# Patient Record
Sex: FEMALE | Race: WHITE | NOT HISPANIC OR LATINO | Employment: OTHER | URBAN - METROPOLITAN AREA
[De-identification: names, ages, dates, MRNs, and addresses within clinical notes are randomized per-mention and may not be internally consistent; named-entity substitution may affect disease eponyms.]

---

## 2017-02-22 ENCOUNTER — ALLSCRIPTS OFFICE VISIT (OUTPATIENT)
Dept: OTHER | Facility: OTHER | Age: 67
End: 2017-02-22

## 2017-04-10 ENCOUNTER — ALLSCRIPTS OFFICE VISIT (OUTPATIENT)
Dept: OTHER | Facility: OTHER | Age: 67
End: 2017-04-10

## 2017-04-25 ENCOUNTER — LAB REQUISITION (OUTPATIENT)
Dept: LAB | Facility: HOSPITAL | Age: 67
End: 2017-04-25
Payer: MEDICARE

## 2017-04-25 ENCOUNTER — GENERIC CONVERSION - ENCOUNTER (OUTPATIENT)
Dept: OTHER | Facility: OTHER | Age: 67
End: 2017-04-25

## 2017-04-25 DIAGNOSIS — D64.9 ANEMIA: ICD-10-CM

## 2017-04-25 DIAGNOSIS — M81.0 AGE-RELATED OSTEOPOROSIS WITHOUT CURRENT PATHOLOGICAL FRACTURE: ICD-10-CM

## 2017-04-25 DIAGNOSIS — E78.2 MIXED HYPERLIPIDEMIA: ICD-10-CM

## 2017-04-25 DIAGNOSIS — E03.9 HYPOTHYROIDISM: ICD-10-CM

## 2017-04-25 LAB
25(OH)D3 SERPL-MCNC: 37.1 NG/ML (ref 30–100)
ALBUMIN SERPL BCP-MCNC: 3.5 G/DL (ref 3.5–5)
ALP SERPL-CCNC: 118 U/L (ref 46–116)
ALT SERPL W P-5'-P-CCNC: 21 U/L (ref 12–78)
ANION GAP SERPL CALCULATED.3IONS-SCNC: 8 MMOL/L (ref 4–13)
AST SERPL W P-5'-P-CCNC: 12 U/L (ref 5–45)
BASOPHILS # BLD AUTO: 0.02 THOUSANDS/ΜL (ref 0–0.1)
BASOPHILS NFR BLD AUTO: 0 % (ref 0–1)
BILIRUB SERPL-MCNC: 0.22 MG/DL (ref 0.2–1)
BUN SERPL-MCNC: 13 MG/DL (ref 5–25)
CALCIUM SERPL-MCNC: 8.9 MG/DL (ref 8.3–10.1)
CHLORIDE SERPL-SCNC: 100 MMOL/L (ref 100–108)
CHOLEST SERPL-MCNC: 192 MG/DL (ref 50–200)
CO2 SERPL-SCNC: 34 MMOL/L (ref 21–32)
CREAT SERPL-MCNC: 0.46 MG/DL (ref 0.6–1.3)
EOSINOPHIL # BLD AUTO: 0.07 THOUSAND/ΜL (ref 0–0.61)
EOSINOPHIL NFR BLD AUTO: 1 % (ref 0–6)
ERYTHROCYTE [DISTWIDTH] IN BLOOD BY AUTOMATED COUNT: 13.3 % (ref 11.6–15.1)
GFR SERPL CREATININE-BSD FRML MDRD: >60 ML/MIN/1.73SQ M
GLUCOSE P FAST SERPL-MCNC: 113 MG/DL (ref 65–99)
HCT VFR BLD AUTO: 40.7 % (ref 34.8–46.1)
HDLC SERPL-MCNC: 63 MG/DL (ref 40–60)
HGB BLD-MCNC: 12.8 G/DL (ref 11.5–15.4)
LDLC SERPL CALC-MCNC: 110 MG/DL (ref 0–100)
LYMPHOCYTES # BLD AUTO: 2.39 THOUSANDS/ΜL (ref 0.6–4.47)
LYMPHOCYTES NFR BLD AUTO: 33 % (ref 14–44)
MCH RBC QN AUTO: 28.3 PG (ref 26.8–34.3)
MCHC RBC AUTO-ENTMCNC: 31.4 G/DL (ref 31.4–37.4)
MCV RBC AUTO: 90 FL (ref 82–98)
MONOCYTES # BLD AUTO: 0.43 THOUSAND/ΜL (ref 0.17–1.22)
MONOCYTES NFR BLD AUTO: 6 % (ref 4–12)
NEUTROPHILS # BLD AUTO: 4.38 THOUSANDS/ΜL (ref 1.85–7.62)
NEUTS SEG NFR BLD AUTO: 60 % (ref 43–75)
NRBC BLD AUTO-RTO: 0 /100 WBCS
PLATELET # BLD AUTO: 263 THOUSANDS/UL (ref 149–390)
PMV BLD AUTO: 10.2 FL (ref 8.9–12.7)
POTASSIUM SERPL-SCNC: 4 MMOL/L (ref 3.5–5.3)
PROT SERPL-MCNC: 7.1 G/DL (ref 6.4–8.2)
RBC # BLD AUTO: 4.53 MILLION/UL (ref 3.81–5.12)
SODIUM SERPL-SCNC: 142 MMOL/L (ref 136–145)
TRIGL SERPL-MCNC: 97 MG/DL
TSH SERPL DL<=0.05 MIU/L-ACNC: 1.58 UIU/ML (ref 0.36–3.74)
WBC # BLD AUTO: 7.3 THOUSAND/UL (ref 4.31–10.16)

## 2017-04-25 PROCEDURE — 84443 ASSAY THYROID STIM HORMONE: CPT | Performed by: FAMILY MEDICINE

## 2017-04-25 PROCEDURE — 85025 COMPLETE CBC W/AUTO DIFF WBC: CPT | Performed by: FAMILY MEDICINE

## 2017-04-25 PROCEDURE — 80061 LIPID PANEL: CPT | Performed by: FAMILY MEDICINE

## 2017-04-25 PROCEDURE — 80053 COMPREHEN METABOLIC PANEL: CPT | Performed by: FAMILY MEDICINE

## 2017-04-25 PROCEDURE — 82306 VITAMIN D 25 HYDROXY: CPT | Performed by: FAMILY MEDICINE

## 2017-05-11 ENCOUNTER — GENERIC CONVERSION - ENCOUNTER (OUTPATIENT)
Dept: OTHER | Facility: OTHER | Age: 67
End: 2017-05-11

## 2017-06-07 ENCOUNTER — ALLSCRIPTS OFFICE VISIT (OUTPATIENT)
Dept: OTHER | Facility: OTHER | Age: 67
End: 2017-06-07

## 2017-06-07 ENCOUNTER — LAB REQUISITION (OUTPATIENT)
Dept: LAB | Facility: HOSPITAL | Age: 67
End: 2017-06-07
Payer: MEDICARE

## 2017-06-07 DIAGNOSIS — Z12.39 ENCOUNTER FOR OTHER SCREENING FOR MALIGNANT NEOPLASM OF BREAST: ICD-10-CM

## 2017-06-07 DIAGNOSIS — R30.0 DYSURIA: ICD-10-CM

## 2017-06-07 DIAGNOSIS — R35.0 FREQUENCY OF MICTURITION: ICD-10-CM

## 2017-06-07 DIAGNOSIS — R10.30 LOWER ABDOMINAL PAIN: ICD-10-CM

## 2017-06-07 LAB
BILIRUB UR QL STRIP: NORMAL
CLARITY UR: NORMAL
COLOR UR: NORMAL
GLUCOSE (HISTORICAL): NEGATIVE
HGB UR QL STRIP.AUTO: NORMAL
KETONES UR STRIP-MCNC: NEGATIVE MG/DL
LEUKOCYTE ESTERASE UR QL STRIP: NORMAL
NITRITE UR QL STRIP: NEGATIVE
PH UR STRIP.AUTO: 5.5 [PH]
PROT UR STRIP-MCNC: NORMAL MG/DL
SP GR UR STRIP.AUTO: 1.02
UROBILINOGEN UR QL STRIP.AUTO: NORMAL

## 2017-06-07 PROCEDURE — 87086 URINE CULTURE/COLONY COUNT: CPT | Performed by: FAMILY MEDICINE

## 2017-06-09 ENCOUNTER — GENERIC CONVERSION - ENCOUNTER (OUTPATIENT)
Dept: OTHER | Facility: OTHER | Age: 67
End: 2017-06-09

## 2017-06-09 LAB — BACTERIA UR CULT: NORMAL

## 2017-08-24 ENCOUNTER — ALLSCRIPTS OFFICE VISIT (OUTPATIENT)
Dept: OTHER | Facility: OTHER | Age: 67
End: 2017-08-24

## 2017-08-24 LAB
BILIRUB UR QL STRIP: NORMAL
CLARITY UR: NORMAL
COLOR UR: YELLOW
GLUCOSE (HISTORICAL): NORMAL
HGB UR QL STRIP.AUTO: NORMAL
KETONES UR STRIP-MCNC: NORMAL MG/DL
LEUKOCYTE ESTERASE UR QL STRIP: NORMAL
NITRITE UR QL STRIP: NORMAL
PH UR STRIP.AUTO: 5.5 [PH]
PROT UR STRIP-MCNC: NORMAL MG/DL
SP GR UR STRIP.AUTO: 1.02
UROBILINOGEN UR QL STRIP.AUTO: 0.2

## 2017-10-03 ENCOUNTER — HOSPITAL ENCOUNTER (OUTPATIENT)
Dept: MAMMOGRAPHY | Facility: MEDICAL CENTER | Age: 67
Discharge: HOME | End: 2017-10-03
Payer: MEDICARE

## 2017-10-03 DIAGNOSIS — Z12.31 ENCOUNTER FOR SCREENING MAMMOGRAM FOR MALIGNANT NEOPLASM OF BREAST: ICD-10-CM

## 2017-10-03 DIAGNOSIS — Z12.39 ENCOUNTER FOR OTHER SCREENING FOR MALIGNANT NEOPLASM OF BREAST: ICD-10-CM

## 2017-10-03 DIAGNOSIS — Z12.31 ENCOUNTER FOR SCREENING MAMMOGRAM FOR HIGH-RISK PATIENT: ICD-10-CM

## 2017-10-03 PROCEDURE — G0202 SCR MAMMO BI INCL CAD: HCPCS

## 2017-10-04 ENCOUNTER — GENERIC CONVERSION - ENCOUNTER (OUTPATIENT)
Dept: OTHER | Facility: OTHER | Age: 67
End: 2017-10-04

## 2017-12-27 ENCOUNTER — GENERIC CONVERSION - ENCOUNTER (OUTPATIENT)
Dept: FAMILY MEDICINE CLINIC | Facility: CLINIC | Age: 67
End: 2017-12-27

## 2018-01-01 ENCOUNTER — HOSPITAL ENCOUNTER (OUTPATIENT)
Dept: MAMMOGRAPHY | Facility: MEDICAL CENTER | Age: 68
Discharge: HOME | End: 2018-12-05
Payer: MEDICARE

## 2018-01-01 ENCOUNTER — TELEPHONE (OUTPATIENT)
Dept: FAMILY MEDICINE CLINIC | Facility: CLINIC | Age: 68
End: 2018-01-01

## 2018-01-01 VITALS — BODY MASS INDEX: 26.43 KG/M2 | HEIGHT: 61 IN | WEIGHT: 140 LBS

## 2018-01-01 DIAGNOSIS — Z12.31 VISIT FOR SCREENING MAMMOGRAM: ICD-10-CM

## 2018-01-01 DIAGNOSIS — J45.20 MILD INTERMITTENT ASTHMA WITHOUT COMPLICATION: ICD-10-CM

## 2018-01-01 PROCEDURE — 77067 SCR MAMMO BI INCL CAD: CPT

## 2018-01-10 NOTE — RESULT NOTES
Verified Results  US KIDNEY AND BLADDER [id number] 02:45PM Rodriguez Cogan Order Number: [id number]    Order Number: [id number]     Test Name Result Flag Reference   US KIDNEY AND BLADDER (Report)     RENAL ULTRASOUND     INDICATION: UTI, microhematuria     COMPARISON: CT 3/30/2016     TECHNIQUE:  Ultrasound of the retroperitoneum was performed with a curvilinear transducer utilizing volumetric sweeps and still imaging techniques  FINDINGS:     KIDNEYS:   Symmetric and normal size  Right kidney: 11 1 x 4 9 cm  Normal echogenicity and contour  No suspicious masses detected  No hydronephrosis  No shadowing calculi  No perinephric fluid collections  Left kidney: 11 x 6 1 cm  Normal echogenicity and contour  No suspicious masses detected  No hydronephrosis  No shadowing calculi  No perinephric fluid collections  URETERS:   Nonvisualized  BLADDER:    Normally distended  No focal thickening or mass lesions  Only the right ureteral jet was visualized  IMPRESSION:     No acute findings          Workstation performed: [id number]     Signed by:   Kerri Gonzalez MD   7/13/16

## 2018-01-10 NOTE — RESULT NOTES
Verified Results  (1) URINE CYTOLOGY [id number] 08:48PM Josiah Venegas     Test Name Result Flag Reference   LAB AP CASE REPORT (Report)     Non-gynecologic Cytology             Case: [id number]                  Authorizing Provider: Rodger Heck PA-C  Collected:      07/11/2016 2048        Ordering Location:   [address] [place] [address]   Received:      07/11/2016 2134                    [facility]ospital [facility]                              Pathologist:      Nae Carmen MD                             Specimen:  Urine, Clean Catch   LAB AP CYTO FINAL DIAGNOSIS (Report)     A  Urine, Clean Catch, :  Negative for high grade urothelial carcinoma ([address]) - see comment  Benign urothelial cells  Benign squamous cells  Neutrophils and bacteria  Satisfactory for Evaluation  Comment: The above diagnostic category is from the recently published   book, [facility] [place] [facility] Urinary Cytology, and is in keeping   with the ongoing effort for utilization of standardized diagnostic   terminology in urine cytology  *    *[employer] [place] [facility] Urinary Cytology  Vandana Vieira; 2016  Electronically signed by Nae Carmen MD on 7/13/2016 at 11:31 AM   LAB AP GROSS DESCRIPTION      A  Urine, Clean Catch, : 40ml  Yellow, clear, received in CytoLyt   LAB AP NONGYN ADDITIONAL INFORMATION (Report)     [employer] (deja mi)gic's FDA approved ,  and ThinPrep Imaging System are   utilized with strict adherence to the 's instruction manual to   prepare gynecologic and non-gynecologic cytology specimens for the   production of ThinPrep slides as well as for gynecologic ThinPrep imaging  These processes have been validated by our laboratory and/or by the     These tests were developed and their performance characteristics   determined by [facility] or [employer]   They may not be cleared or approved by the [place]  Food and   Drug Administration  The FDA has determined that such clearance or   approval is not necessary  These tests are used for clinical purposes  They should not be regarded as investigational or for research  This   laboratory has been approved by CLIA 88, designated as a high-complexity   laboratory and is qualified to perform these tests     LAB AP NONGYN NOTE      Interpretation performed at [facility], [address]   [id number]

## 2018-01-11 NOTE — RESULT NOTES
Verified Results  * MAMMO SCREENING BILATERAL W CAD [id number] 11:20AM Ale Bentley Order Number: [id number]    - Patient Instructions: To schedule this appointment, please contact Central Scheduling at [phone]  Do not wear any perfume, powder, lotion or deodorant on breast or underarm area  Please bring your doctors order, referral (if needed) and insurance information with you on the day of the test  Failure to bring this information may result in this test being rescheduled  Arrive 15 minutes prior to your appointment time to register  On the day of your test, please bring any prior mammogram or breast studies with you that were not performed at a [facility]  Failure to bring prior exams may result in your test needing to be rescheduled  Test Name Result Flag Reference   MAMMO SCREENING BILATERAL W CAD (Report)     Patient History:   Patient is postmenopausal    Family history of unknown cancer at age 64 in sister, unknown    cancer in maternal grandfather, unknown cancer in brother, breast   cancer in maternal aunt  Patient has never smoked  Patient's BMI is 23 9  Reason for exam: screening, asymptomatic  Mammo Screening Bilateral W CAD: October 3, 2017 - Check In #:    [de-identified]   Bilateral CC and MLO view(s) were taken  Technologist: MICHELL Fowler (MICHELL)(M)   Prior study comparison: July 12, 2016, mammo screening bilateral    W CAD, performed at [address] [place] [address]  May    13, 2015, bilateral digital screening mammogram performed at [place] [address]  Galilea 3, 2009, bilateral    digital screening mammogram performed at [place] [address]  There are scattered fibroglandular densities  No dominant soft tissue mass, architectural distortion or    suspicious calcifications are noted in either breast  The skin    and nipple contours are within normal limits        No evidence of malignancy  No significant changes when compared with prior studies  ACR BI-RADSï¾® Assessments: BiRad:1 - Negative     Recommendation:   Routine screening mammogram of both breasts in 1 year  Analyzed by CAD     The patient is scheduled in a reminder system for screening    mammography  8-10% of cancers will be missed on mammography  Management of a    palpable abnormality must be based on clinical grounds  Patients   will be notified of their results via letter from our facility  Accredited by [employer] and FDA       Transcription Location: [facility] 98: [id number]     Risk Value(s):   Tyrer-Cuzick 10 Year: 2 200%, Tyrer-Cuzick Lifetime: 4 000%,    Myriad Table: 1 5%, AMADOR 5 Year: 1 5%, NCI Lifetime: 5 0%   Signed by:   Jose Luis Taveras MD   10/4/17

## 2018-01-11 NOTE — RESULT NOTES
Verified Results  (1) CBC/PLT/DIFF 25Apr2017 10:03AM Mike Jay Order Number: [id number]     Test Name Result Flag Reference   WBC COUNT 7 30 Thousand/uL  4 31-10 16   RBC COUNT 4 53 Million/uL  3 81-5 12   HEMOGLOBIN 12 8 g/dL  11 5-15 4   HEMATOCRIT 40 7 %  34 8-46  1   MCV 90 fL  82-98   MCH 28 3 pg  26 8-34 3   MCHC 31 4 g/dL  31 4-37 4   RDW 13 3 %  11 6-15 1   MPV 10 2 fL  8 9-12 7   PLATELET COUNT 608 Thousands/uL  149-390   nRBC AUTOMATED 0 /100 WBCs     NEUTROPHILS RELATIVE PERCENT 60 %  43-75   LYMPHOCYTES RELATIVE PERCENT 33 %  14-44   MONOCYTES RELATIVE PERCENT 6 %  4-12   EOSINOPHILS RELATIVE PERCENT 1 %  0-6   BASOPHILS RELATIVE PERCENT 0 %  0-1   NEUTROPHILS ABSOLUTE COUNT 4 38 Thousands/? ??L  1 85-7 62   LYMPHOCYTES ABSOLUTE COUNT 2 39 Thousands/? ??L  0 60-4 47   MONOCYTES ABSOLUTE COUNT 0 43 Thousand/? ??L  0 17-1 22   EOSINOPHILS ABSOLUTE COUNT 0 07 Thousand/? ??L  0 00-0 61   BASOPHILS ABSOLUTE COUNT 0 02 Thousands/? ??L  0 00-0 10     (1) COMPREHENSIVE METABOLIC PANEL [id number] 31:79YZ Mike Jay Order Number: [id number]     Test Name Result Flag Reference   SODIUM 142 mmol/L  136-145   POTASSIUM 4 0 mmol/L  3 5-5 3   CHLORIDE 100 mmol/L  100-108   CARBON DIOXIDE 34 mmol/L H 21-32   ANION GAP (CALC) 8 mmol/L  4-13   BLOOD UREA NITROGEN 13 mg/dL  5-25   CREATININE 0 46 mg/dL L 0 60-1 30   Standardized to IDMS reference method   CALCIUM 8 9 mg/dL  8 3-10 1   BILI, TOTAL 0 22 mg/dL  0 20-1 00   ALK PHOSPHATAS 118 U/L H    ALT (SGPT) 21 U/L  12-78   AST(SGOT) 12 U/L  5-45   ALBUMIN 3 5 g/dL  3 5-5 0   TOTAL PROTEIN 7 1 g/dL  6 4-8 2   eGFR Non-African American      >60 0 ml/min/1 73sq [facility] Disease Education Program recommendations are as follows:  GFR calculation is accurate only with a steady state creatinine  Chronic Kidney disease less than 60 ml/min/1 73 sq  meters  Kidney failure less than 15 ml/min/1 73 sq  meters     GLUCOSE FASTING 113 mg/dL H 65-99     (1) LIPID PANEL FASTING W DIRECT LDL REFLEX 25Apr2017 10:03AM Suzzane Sol Order Number: [id number]     Test Name Result Flag Reference   CHOLESTEROL 192 mg/dL     LDL CHOLESTEROL CALCULATED 110 mg/dL H 0-100   Triglyceride:         Normal              <150 mg/dl       Borderline High    150-199 mg/dl       High               200-499 mg/dl       Very High          >499 mg/dl  Cholesterol:         Desirable        <200 mg/dl      Borderline High  200-239 mg/dl      High             >239 mg/dl  HDL Cholesterol:        High    >59 mg/dL      Low     <41 mg/dL  LDL Cholesterol:        Optimal          <100 mg/dl        Near Optimal     100-129 mg/dl        Above Optimal          Borderline High   130-159 mg/dl          High              160-189 mg/dl          Very High        >189 mg/dl  LDL CALCULATED:    This screening LDL is a calculated result  It does not have the accuracy of the Direct Measured LDL in the monitoring of patients with hyperlipidemia and/or statin therapy  Direct Measure LDL (RWH378) must be ordered separately in these patients  TRIGLYCERIDES 97 mg/dL  <=150   Specimen collection should occur prior to N-Acetylcysteine or Metamizole administration due to the potential for falsely depressed results  HDL,DIRECT 63 mg/dL H 40-60   Specimen collection should occur prior to Metamizole administration due to the potential for falsely depressed results  (1) TSH 25Apr2017 10:03AM Suzzane Sol Order Number: [id number]     Test Name Result Flag Reference   TSH 1 580 uIU/mL  0 358-3 740   Patients undergoing fluorescein dye angiography may retain small amounts of fluorescein in the body for 48-72 hours post procedure  Samples containing fluorescein can produce falsely depressed TSH values  If the patient had this procedure,a specimen should be resubmitted post fluorescein clearance            The recommended reference ranges for TSH during pregnancy are as follows:  First trimester 0 1 to 2 5 uIU/mL  Second trimester  0 2 to 3 0 uIU/mL  Third trimester 0 3 to 3 0 uIU/m       Plan  Osteoporosis    · (1) VITAMIN D 25-HYDROXY; Status: In Progress - Specimen/Data Collected;   Done:  [id number]

## 2018-01-12 VITALS
HEIGHT: 63 IN | DIASTOLIC BLOOD PRESSURE: 80 MMHG | BODY MASS INDEX: 23.39 KG/M2 | OXYGEN SATURATION: 92 % | WEIGHT: 132 LBS | HEART RATE: 85 BPM | TEMPERATURE: 98.4 F | SYSTOLIC BLOOD PRESSURE: 134 MMHG

## 2018-01-12 NOTE — RESULT NOTES
Verified Results  (1) URINE CULTURE [id number] 02:32PM Riley Barry    Order Number: [id number]     Test Name Result Flag Reference   CLINICAL REPORT (Report)     Test:        Urine culture  Specimen Type:   Urine  Specimen Date:   7/11/2016 2:32 PM  Result Date:    7/12/2016 4:16 PM  Result Status:   Final result  Resulting Lab:   [place] [address]            Tel: [phone]      CULTURE                                       ------------------                                   10,000-19,000 cfu/ml Mixed Contaminants X4

## 2018-01-14 VITALS
HEART RATE: 85 BPM | RESPIRATION RATE: 17 BRPM | DIASTOLIC BLOOD PRESSURE: 76 MMHG | TEMPERATURE: 98.7 F | HEIGHT: 63 IN | SYSTOLIC BLOOD PRESSURE: 142 MMHG | WEIGHT: 139.38 LBS | OXYGEN SATURATION: 92 % | BODY MASS INDEX: 24.7 KG/M2

## 2018-01-14 VITALS
SYSTOLIC BLOOD PRESSURE: 136 MMHG | WEIGHT: 131.19 LBS | DIASTOLIC BLOOD PRESSURE: 84 MMHG | RESPIRATION RATE: 16 BRPM | TEMPERATURE: 98.7 F | BODY MASS INDEX: 23.25 KG/M2 | HEIGHT: 63 IN | HEART RATE: 90 BPM | OXYGEN SATURATION: 97 %

## 2018-01-14 NOTE — RESULT NOTES
Verified Results  (1) URINE CULTURE 07Jun2017 03:09PM Mohanjeffrey Comp Order Number: [id number]     Test Name Result Flag Reference   CLINICAL REPORT (Report)     Test:        Urine culture  Specimen Type:   Urine  Specimen Date:   6/7/2017 3:09 PM  Result Date:    6/9/2017 7:27 AM  Result Status:   Final result  Resulting Lab:   [place] [address]            Tel: [phone]      CULTURE                                       ------------------                                   20,000-29,000 cfu/ml Mixed Contaminants X3

## 2018-01-14 NOTE — RESULT NOTES
Verified Results  (1) URINE CULTURE 06Jun2016 02:15PM [place] [employer] Order Number: [id number]     Test Name Result Flag Reference   CLINICAL REPORT (Report)     Test:        Urine culture  Specimen Type:   Urine  Specimen Date:   6/6/2016 2:15 PM  Result Date:    6/7/2016 6:26 PM  Result Status:   Final result  Resulting Lab:   [place] [address]            Tel: [phone]      CULTURE                                       ------------------                                   No Growth <1000 cfu/mL

## 2018-01-14 NOTE — RESULT NOTES
Verified Results  * MAMMO SCREENING BILATERAL W CAD [id number] 02:45PM Roel Seek Order Number: [id number]     Test Name Result Flag Reference   MAMMO SCREENING BILATERAL W CAD (Report)     Patient History:   Patient is postmenopausal    Family history of unknown cancer in maternal grandfather, breast    cancer in maternal aunt, unknown cancer in brother, and unknown    cancer in sister at age 64  Patient has never smoked  Patient's BMI is 23 9  Reason for exam: screening (asymptomatic)  Mammo Screening Bilateral W CAD: July 12, 2016 - Check In #:    [de-identified]   Bilateral MLO and CC view(s) were taken  Technologist: MICHELL Barrientos (MICHELL)(M)   Prior study comparison: May 13, 2015, bilateral digital screening   mammogram, performed at [place] [address]  Galilea 3, 2009, bilateral digital screening mammogram,    performed at [place] [address]  October 16, 2006, bilateral diagnostic mammogram, performed at [place] [address]  October 11, 2005,    bilateral diagnostic mammogram, performed at [place] [address]  There are scattered fibroglandular densities  No dominant soft tissue mass, architectural distortion or    suspicious calcifications are noted  The skin and nipple    contours are within normal limits  No evidence of malignancy  No significant changes   when compared with prior studies  ASSESSMENT: BiRad:1 - Negative     Recommendation:   Routine screening mammogram of both breasts in 1 year  A    reminder letter will be scheduled  Analyzed by CAD     8-10% of cancers will be missed on mammography  Management of a    palpable abnormality must be based on clinical grounds  Patients   will be notified of their results via letter from our facility  Accredited by [employer] and FDA       Transcription Location: MICHELL Tenorio 98: [id number]     Risk Value(s):   Tyrer-Cuzick 10 Year: 2 223%, Tyrer-Cuzick Lifetime: 4 252%,    Myriad Table: 1 5%, AMADOR 5 Year: 1 5%, NCI Lifetime: 5 2%   Signed by:   Sara Moscoso MD   7/12/16

## 2018-01-15 VITALS
HEART RATE: 88 BPM | BODY MASS INDEX: 23.57 KG/M2 | WEIGHT: 133 LBS | SYSTOLIC BLOOD PRESSURE: 128 MMHG | TEMPERATURE: 97.8 F | RESPIRATION RATE: 16 BRPM | HEIGHT: 63 IN | DIASTOLIC BLOOD PRESSURE: 72 MMHG | OXYGEN SATURATION: 96 %

## 2018-02-01 ENCOUNTER — DOCUMENTATION (OUTPATIENT)
Dept: FAMILY MEDICINE CLINIC | Facility: CLINIC | Age: 68
End: 2018-02-01

## 2018-02-01 ENCOUNTER — TELEPHONE (OUTPATIENT)
Dept: FAMILY MEDICINE CLINIC | Facility: CLINIC | Age: 68
End: 2018-02-01

## 2018-02-02 ENCOUNTER — DOCUMENTATION (OUTPATIENT)
Dept: FAMILY MEDICINE CLINIC | Facility: CLINIC | Age: 68
End: 2018-02-02

## 2018-02-02 ENCOUNTER — TELEPHONE (OUTPATIENT)
Dept: FAMILY MEDICINE CLINIC | Facility: CLINIC | Age: 68
End: 2018-02-02

## 2018-02-05 DIAGNOSIS — J45.20 MILD INTERMITTENT ASTHMA WITHOUT COMPLICATION: Primary | ICD-10-CM

## 2018-02-05 RX ORDER — ALBUTEROL SULFATE 90 UG/1
AEROSOL, METERED RESPIRATORY (INHALATION)
COMMUNITY
Start: 2012-10-15 | End: 2018-02-05 | Stop reason: SDUPTHER

## 2018-02-05 RX ORDER — ALBUTEROL SULFATE 90 UG/1
2 AEROSOL, METERED RESPIRATORY (INHALATION) EVERY 4 HOURS PRN
Qty: 8.5 INHALER | Refills: 1 | Status: SHIPPED | OUTPATIENT
Start: 2018-02-05 | End: 2018-05-17 | Stop reason: SDUPTHER

## 2018-02-09 ENCOUNTER — TELEPHONE (OUTPATIENT)
Dept: FAMILY MEDICINE CLINIC | Facility: CLINIC | Age: 68
End: 2018-02-09

## 2018-02-09 DIAGNOSIS — J32.9 CHRONIC SINUSITIS, UNSPECIFIED LOCATION: Primary | ICD-10-CM

## 2018-02-09 RX ORDER — PHENAZOPYRIDINE HYDROCHLORIDE 200 MG/1
1 TABLET, FILM COATED ORAL EVERY 8 HOURS
COMMUNITY
Start: 2017-06-07 | End: 2018-03-05

## 2018-02-09 RX ORDER — LEVOTHYROXINE SODIUM 25 MCG
TABLET ORAL
Refills: 0 | COMMUNITY
Start: 2017-11-08 | End: 2018-03-05 | Stop reason: SDUPTHER

## 2018-02-09 RX ORDER — IPRATROPIUM BROMIDE 42 UG/1
2 SPRAY, METERED NASAL 3 TIMES DAILY
Qty: 15 ML | Refills: 3 | Status: SHIPPED | OUTPATIENT
Start: 2018-02-09 | End: 2018-05-22 | Stop reason: SDUPTHER

## 2018-02-09 RX ORDER — ROSUVASTATIN CALCIUM 20 MG/1
20 TABLET, COATED ORAL
COMMUNITY
Start: 2014-07-17 | End: 2019-01-01 | Stop reason: SDUPTHER

## 2018-02-09 RX ORDER — PNV NO.95/FERROUS FUM/FOLIC AC 28MG-0.8MG
1 TABLET ORAL 2 TIMES DAILY
COMMUNITY
End: 2019-01-01 | Stop reason: HOSPADM

## 2018-02-09 RX ORDER — PANTOPRAZOLE SODIUM 40 MG/1
TABLET, DELAYED RELEASE ORAL
Refills: 0 | COMMUNITY
Start: 2018-01-21 | End: 2018-05-23

## 2018-02-09 RX ORDER — VENLAFAXINE HYDROCHLORIDE 75 MG/1
75 CAPSULE, EXTENDED RELEASE ORAL EVERY MORNING
COMMUNITY
Start: 2014-03-24 | End: 2019-01-01 | Stop reason: HOSPADM

## 2018-02-09 RX ORDER — FLUNISOLIDE 0.25 MG/ML
2 SOLUTION NASAL EVERY 12 HOURS
Qty: 1 BOTTLE | Refills: 0 | Status: SHIPPED | OUTPATIENT
Start: 2018-02-09 | End: 2018-05-23

## 2018-02-09 RX ORDER — GLUCOSAMINE HCL 500 MG
1 TABLET ORAL EVERY MORNING
COMMUNITY
Start: 2012-10-15 | End: 2019-01-01 | Stop reason: ALTCHOICE

## 2018-02-09 RX ORDER — ALPRAZOLAM 0.25 MG/1
0.25 TABLET ORAL 2 TIMES DAILY PRN
Refills: 0 | COMMUNITY
Start: 2018-01-22 | End: 2019-01-01 | Stop reason: HOSPADM

## 2018-02-09 RX ORDER — IPRATROPIUM BROMIDE 42 UG/1
SPRAY, METERED NASAL
COMMUNITY
Start: 2014-02-05 | End: 2018-02-09 | Stop reason: SDUPTHER

## 2018-02-09 RX ORDER — ELUXADOLINE 100 MG/1
TABLET, FILM COATED ORAL
Refills: 0 | COMMUNITY
Start: 2018-02-01 | End: 2018-03-02 | Stop reason: SDUPTHER

## 2018-02-09 RX ORDER — CARBAMAZEPINE 200 MG/1
TABLET ORAL
COMMUNITY
Start: 2012-10-15 | End: 2018-03-05 | Stop reason: SDUPTHER

## 2018-02-09 RX ORDER — DICYCLOMINE HYDROCHLORIDE 10 MG/1
1 CAPSULE ORAL 3 TIMES DAILY
COMMUNITY
Start: 2014-04-29 | End: 2019-01-01 | Stop reason: ALTCHOICE

## 2018-02-09 RX ORDER — EPINEPHRINE 0.3 MG/.3ML
INJECTION SUBCUTANEOUS
COMMUNITY
Start: 2014-10-07 | End: 2019-01-01 | Stop reason: HOSPADM

## 2018-02-09 RX ORDER — CARBAMAZEPINE 200 MG/1
200 TABLET ORAL SEE ADMIN INSTRUCTIONS
Refills: 0 | COMMUNITY
Start: 2018-01-21 | End: 2019-01-01 | Stop reason: HOSPADM

## 2018-02-09 RX ORDER — OLOPATADINE HYDROCHLORIDE 2 MG/ML
1 SOLUTION/ DROPS OPHTHALMIC DAILY
COMMUNITY
Start: 2013-11-04 | End: 2018-11-26 | Stop reason: SDUPTHER

## 2018-02-09 RX ORDER — LEVOTHYROXINE SODIUM 0.03 MG/1
1 TABLET ORAL EVERY MORNING
COMMUNITY
Start: 2015-05-11 | End: 2019-01-01 | Stop reason: SDUPTHER

## 2018-02-20 ENCOUNTER — DOCUMENTATION (OUTPATIENT)
Dept: FAMILY MEDICINE CLINIC | Facility: CLINIC | Age: 68
End: 2018-02-20

## 2018-02-26 PROBLEM — J32.9 CHRONIC SINUSITIS: Status: ACTIVE | Noted: 2017-08-24

## 2018-03-02 DIAGNOSIS — K58.0 IRRITABLE BOWEL SYNDROME WITH DIARRHEA: Primary | ICD-10-CM

## 2018-03-02 DIAGNOSIS — K58.0 IRRITABLE BOWEL SYNDROME WITH DIARRHEA: ICD-10-CM

## 2018-03-02 RX ORDER — ELUXADOLINE 100 MG/1
TABLET, FILM COATED ORAL
Qty: 60 TABLET | Refills: 2 | Status: SHIPPED | OUTPATIENT
Start: 2018-03-02 | End: 2018-03-02 | Stop reason: SDUPTHER

## 2018-03-05 ENCOUNTER — OFFICE VISIT (OUTPATIENT)
Dept: FAMILY MEDICINE CLINIC | Facility: CLINIC | Age: 68
End: 2018-03-05
Payer: MEDICARE

## 2018-03-05 VITALS
DIASTOLIC BLOOD PRESSURE: 90 MMHG | OXYGEN SATURATION: 94 % | BODY MASS INDEX: 24.82 KG/M2 | SYSTOLIC BLOOD PRESSURE: 148 MMHG | HEART RATE: 86 BPM | TEMPERATURE: 97.6 F | WEIGHT: 140.1 LBS

## 2018-03-05 DIAGNOSIS — J45.40 MODERATE PERSISTENT ASTHMA, UNSPECIFIED WHETHER COMPLICATED: ICD-10-CM

## 2018-03-05 DIAGNOSIS — J34.89 NASAL SEPTAL PERFORATION: ICD-10-CM

## 2018-03-05 DIAGNOSIS — J01.90 ACUTE SINUSITIS, RECURRENCE NOT SPECIFIED, UNSPECIFIED LOCATION: Primary | ICD-10-CM

## 2018-03-05 PROBLEM — R73.03 PREDIABETES: Status: ACTIVE | Noted: 2017-12-27

## 2018-03-05 PROBLEM — E04.1 THYROID NODULE: Status: ACTIVE | Noted: 2017-12-27

## 2018-03-05 PROCEDURE — 99214 OFFICE O/P EST MOD 30 MIN: CPT | Performed by: PHYSICIAN ASSISTANT

## 2018-03-05 RX ORDER — METHYLPREDNISOLONE 4 MG/1
TABLET ORAL
Qty: 21 TABLET | Refills: 0 | Status: SHIPPED | OUTPATIENT
Start: 2018-03-05 | End: 2018-05-23

## 2018-03-05 RX ORDER — MONTELUKAST SODIUM 10 MG/1
10 TABLET ORAL
Qty: 30 TABLET | Refills: 5 | Status: SHIPPED | OUTPATIENT
Start: 2018-03-05 | End: 2018-08-18 | Stop reason: SDUPTHER

## 2018-03-05 RX ORDER — SULFAMETHOXAZOLE AND TRIMETHOPRIM 800; 160 MG/1; MG/1
1 TABLET ORAL EVERY 12 HOURS SCHEDULED
Qty: 20 TABLET | Refills: 0 | Status: SHIPPED | OUTPATIENT
Start: 2018-03-05 | End: 2018-03-15

## 2018-03-05 RX ORDER — AZELASTINE 1 MG/ML
1-2 SPRAY, METERED NASAL
COMMUNITY
End: 2018-10-25 | Stop reason: SDUPTHER

## 2018-03-05 RX ORDER — FERROUS SULFATE 325(65) MG
1 TABLET ORAL
COMMUNITY
Start: 2014-01-08 | End: 2018-03-05 | Stop reason: SDUPTHER

## 2018-03-05 NOTE — PROGRESS NOTES
Assessment/Plan:      Diagnoses and all orders for this visit:    Acute sinusitis, recurrence not specified, unspecified location  -     sulfamethoxazole-trimethoprim (BACTRIM DS) 800-160 mg per tablet; Take 1 tablet by mouth every 12 (twelve) hours for 10 days  -     Methylprednisolone 4 MG TBPK; Use as directed on package    Moderate persistent asthma, unspecified whether complicated  -     montelukast (SINGULAIR) 10 mg tablet; Take 1 tablet (10 mg total) by mouth daily at bedtime    Nasal septal perforation       50-year-old female here today for sinus symptoms ongoing as well as some asthma symptoms  Patient to complete a 10 day course of Bactrim for suspected sinusitis  She has severe excoriation in her right nasal passage and she has seen ENT in the past for perforation of her septum  I can't exactly make out the full anatomy of her nasal passage but I did review the ENT note from 2016  She states that she was supposed to have surgery but decided against it  At this point I will have her take Medrol Dosepak to help with her asthma symptoms as well as her nasal congestion  I have advised that she temporarily discontinue the antihistamine nasal spray that might be causing more excoriation and only consider using nasal saline  She has been using her Qvar daily but has been eating her ProAir more frequently  She is not taking an allergy pill  I believe she may benefit from daily Singulair from an asthma and allergy standpoint  She will continue her Qvar daily and ProAir as needed  She can take Delsym as needed for cough  She was advised to follow-up should symptoms persist or worsen despite treatment  I may also consider reassessment by ENT if necessary  Subjective:     Patient ID: Harriett Bo is a 70 y o  female     63y/o female here today for URI sxs for a while  She reports excessive coughing and mucous  She has been needing her inhaler more  Reports PND, sinus pressure, congestion   Sore throat, dizziness  She feels tight in chest with exertion  No fevers  She takes her qvar every day  Review of Systems   Constitutional: Positive for fatigue  Negative for fever  HENT: Positive for congestion, ear pain, postnasal drip, rhinorrhea and sore throat  Respiratory: Positive for cough, chest tightness and shortness of breath  Cardiovascular: Negative  Gastrointestinal: Negative  Psychiatric/Behavioral: Negative  Objective:     Physical Exam   Constitutional: She is oriented to person, place, and time  She appears well-developed and well-nourished  Appears fatigued   HENT:   Right Ear: External ear normal    Left Ear: External ear normal    Mouth/Throat: No oropharyngeal exudate  Excoriation of left nasal passage, no visible septum  Excessive PND   Neck: Neck supple  Cardiovascular: Normal rate, regular rhythm and normal heart sounds  Pulmonary/Chest: Effort normal    Mild decreased BS throughout B/l posterior lung fields  Lymphadenopathy:     She has no cervical adenopathy  Neurological: She is alert and oriented to person, place, and time  Psychiatric: She has a normal mood and affect  Vitals reviewed        Vitals:    03/05/18 1118   BP: 148/90   BP Location: Left arm   Patient Position: Sitting   Pulse: 86   Temp: 97 6 °F (36 4 °C)   TempSrc: Tympanic   SpO2: 94%   Weight: 63 5 kg (140 lb 1 6 oz)

## 2018-05-17 DIAGNOSIS — J45.20 MILD INTERMITTENT ASTHMA WITHOUT COMPLICATION: ICD-10-CM

## 2018-05-22 DIAGNOSIS — J32.9 CHRONIC SINUSITIS, UNSPECIFIED LOCATION: ICD-10-CM

## 2018-05-22 RX ORDER — IPRATROPIUM BROMIDE 42 UG/1
2 SPRAY, METERED NASAL 3 TIMES DAILY
Qty: 15 ML | Refills: 3 | Status: SHIPPED | OUTPATIENT
Start: 2018-05-22 | End: 2018-10-25 | Stop reason: SDUPTHER

## 2018-05-23 ENCOUNTER — OFFICE VISIT (OUTPATIENT)
Dept: FAMILY MEDICINE CLINIC | Facility: CLINIC | Age: 68
End: 2018-05-23
Payer: MEDICARE

## 2018-05-23 VITALS
OXYGEN SATURATION: 96 % | WEIGHT: 142 LBS | DIASTOLIC BLOOD PRESSURE: 76 MMHG | BODY MASS INDEX: 26.81 KG/M2 | SYSTOLIC BLOOD PRESSURE: 130 MMHG | HEIGHT: 61 IN | HEART RATE: 80 BPM | TEMPERATURE: 96.8 F | RESPIRATION RATE: 16 BRPM

## 2018-05-23 DIAGNOSIS — K21.00 ESOPHAGITIS, REFLUX: Primary | ICD-10-CM

## 2018-05-23 DIAGNOSIS — M81.0 OSTEOPOROSIS, UNSPECIFIED OSTEOPOROSIS TYPE, UNSPECIFIED PATHOLOGICAL FRACTURE PRESENCE: ICD-10-CM

## 2018-05-23 DIAGNOSIS — E78.2 MIXED HYPERLIPIDEMIA: ICD-10-CM

## 2018-05-23 DIAGNOSIS — J32.9 CHRONIC SINUSITIS, UNSPECIFIED LOCATION: ICD-10-CM

## 2018-05-23 DIAGNOSIS — J45.909 MODERATE ASTHMA, UNSPECIFIED WHETHER COMPLICATED, UNSPECIFIED WHETHER PERSISTENT: ICD-10-CM

## 2018-05-23 DIAGNOSIS — R73.03 PREDIABETES: ICD-10-CM

## 2018-05-23 DIAGNOSIS — E03.9 HYPOTHYROIDISM, UNSPECIFIED TYPE: ICD-10-CM

## 2018-05-23 PROCEDURE — 99214 OFFICE O/P EST MOD 30 MIN: CPT | Performed by: FAMILY MEDICINE

## 2018-05-23 NOTE — PROGRESS NOTES
Assessment/Plan:    Hypothyroidism  Due for lab work  Recheck TSH near future  Continue levothyroxine    Chronic sinusitis  Continue current nasal sprays nasal lavage Singulair and follow up with ENT  Osteoporosis  Continue with Reclast   Increase vitamin-D to 2000 units per day  Mixed hyperlipidemia  Re check fasting lipid panel and continue with Crestor       Diagnoses and all orders for this visit:    Esophagitis, reflux  -     CBC and differential; Future    Hypothyroidism, unspecified type  -     TSH, 3rd generation; Future    Osteoporosis, unspecified osteoporosis type, unspecified pathological fracture presence    Moderate asthma, unspecified whether complicated, unspecified whether persistent    Mixed hyperlipidemia  -     Comprehensive metabolic panel; Future  -     Lipid Panel with Direct LDL reflex; Future    Prediabetes  -     HEMOGLOBIN A1C W/ EAG ESTIMATION; Future    Chronic sinusitis, unspecified location          Subjective:      Patient ID: Oliver Oneil is a 70 y o  female  Patient presents for routine follow-up chronic medical problems  Her chronic problems include hyperlipidemia, pre diabetes, osteoporosis, chronic sinusitis and asthma, reflux, anxiety and depression  She has no real new symptoms today  She is compliant with her medications  She follows with Endocrinology for her osteoporosis  She sees Psychiatry for her anxiety and depression  She has seen ENT in the past regarding her chronic sinusitis and surgery was recommended but she was reluctant to consider it though at this time she is reconsidering a return to discuss surgery  She takes levothyroxine for her thyroid, Singulair, Qvar and nasal spray for her sinuses and asthma  She takes Tegretol and Xanax for her anxiety and depression along with venlafaxine  She takes Crestor for her lipids          The following portions of the patient's history were reviewed and updated as appropriate: allergies, current medications, past family history, past medical history, past social history, past surgical history and problem list     Review of Systems   Constitutional: Negative  HENT: Positive for congestion and postnasal drip  Respiratory: Positive for wheezing  Cardiovascular: Negative  Gastrointestinal: Negative  Genitourinary: Negative  Musculoskeletal: Negative  Psychiatric/Behavioral: Negative  Objective:      /76   Pulse 80   Temp (!) 96 8 °F (36 °C) (Tympanic)   Resp 16   Ht 5' 1 14" (1 553 m)   Wt 64 4 kg (142 lb)   SpO2 96%   Breastfeeding? No   BMI 26 71 kg/m²          Physical Exam   Constitutional: She is oriented to person, place, and time  She appears well-developed and well-nourished  No distress  HENT:   Head: Normocephalic and atraumatic  Eyes: Conjunctivae are normal  Pupils are equal, round, and reactive to light  Right eye exhibits no discharge  Neck: Normal range of motion  No thyromegaly present  Cardiovascular: Normal rate and regular rhythm  Pulmonary/Chest: Effort normal and breath sounds normal  No respiratory distress  Lymphadenopathy:     She has no cervical adenopathy  Neurological: She is alert and oriented to person, place, and time  Skin: Skin is warm and dry  She is not diaphoretic  Psychiatric: She has a normal mood and affect  Her behavior is normal  Judgment and thought content normal    Nursing note and vitals reviewed

## 2018-06-05 ENCOUNTER — CLINICAL SUPPORT (OUTPATIENT)
Dept: FAMILY MEDICINE CLINIC | Facility: CLINIC | Age: 68
End: 2018-06-05
Payer: MEDICARE

## 2018-06-05 DIAGNOSIS — R73.03 PREDIABETES: ICD-10-CM

## 2018-06-05 DIAGNOSIS — E03.9 HYPOTHYROIDISM, UNSPECIFIED TYPE: ICD-10-CM

## 2018-06-05 DIAGNOSIS — K21.00 ESOPHAGITIS, REFLUX: ICD-10-CM

## 2018-06-05 DIAGNOSIS — E78.2 MIXED HYPERLIPIDEMIA: ICD-10-CM

## 2018-06-05 LAB
ALBUMIN SERPL BCP-MCNC: 3.4 G/DL (ref 3.5–5)
ALP SERPL-CCNC: 104 U/L (ref 46–116)
ALT SERPL W P-5'-P-CCNC: 25 U/L (ref 12–78)
ANION GAP SERPL CALCULATED.3IONS-SCNC: 5 MMOL/L (ref 4–13)
AST SERPL W P-5'-P-CCNC: 17 U/L (ref 5–45)
BASOPHILS # BLD AUTO: 0.02 THOUSANDS/ΜL (ref 0–0.1)
BASOPHILS NFR BLD AUTO: 0 % (ref 0–1)
BILIRUB SERPL-MCNC: 0.31 MG/DL (ref 0.2–1)
BUN SERPL-MCNC: 9 MG/DL (ref 5–25)
CALCIUM SERPL-MCNC: 8.7 MG/DL (ref 8.3–10.1)
CHLORIDE SERPL-SCNC: 102 MMOL/L (ref 100–108)
CHOLEST SERPL-MCNC: 180 MG/DL (ref 50–200)
CO2 SERPL-SCNC: 36 MMOL/L (ref 21–32)
CREAT SERPL-MCNC: 0.49 MG/DL (ref 0.6–1.3)
EOSINOPHIL # BLD AUTO: 0.05 THOUSAND/ΜL (ref 0–0.61)
EOSINOPHIL NFR BLD AUTO: 1 % (ref 0–6)
ERYTHROCYTE [DISTWIDTH] IN BLOOD BY AUTOMATED COUNT: 13.5 % (ref 11.6–15.1)
EST. AVERAGE GLUCOSE BLD GHB EST-MCNC: 134 MG/DL
GFR SERPL CREATININE-BSD FRML MDRD: 100 ML/MIN/1.73SQ M
GLUCOSE P FAST SERPL-MCNC: 101 MG/DL (ref 65–99)
HBA1C MFR BLD: 6.3 % (ref 4.2–6.3)
HCT VFR BLD AUTO: 41.4 % (ref 34.8–46.1)
HDLC SERPL-MCNC: 63 MG/DL (ref 40–60)
HGB BLD-MCNC: 12.4 G/DL (ref 11.5–15.4)
IMM GRANULOCYTES # BLD AUTO: 0.01 THOUSAND/UL (ref 0–0.2)
IMM GRANULOCYTES NFR BLD AUTO: 0 % (ref 0–2)
LDLC SERPL CALC-MCNC: 86 MG/DL (ref 0–100)
LYMPHOCYTES # BLD AUTO: 1.86 THOUSANDS/ΜL (ref 0.6–4.47)
LYMPHOCYTES NFR BLD AUTO: 31 % (ref 14–44)
MCH RBC QN AUTO: 28.3 PG (ref 26.8–34.3)
MCHC RBC AUTO-ENTMCNC: 30 G/DL (ref 31.4–37.4)
MCV RBC AUTO: 95 FL (ref 82–98)
MONOCYTES # BLD AUTO: 0.43 THOUSAND/ΜL (ref 0.17–1.22)
MONOCYTES NFR BLD AUTO: 7 % (ref 4–12)
NEUTROPHILS # BLD AUTO: 3.69 THOUSANDS/ΜL (ref 1.85–7.62)
NEUTS SEG NFR BLD AUTO: 61 % (ref 43–75)
NRBC BLD AUTO-RTO: 0 /100 WBCS
PLATELET # BLD AUTO: 241 THOUSANDS/UL (ref 149–390)
PMV BLD AUTO: 10.4 FL (ref 8.9–12.7)
POTASSIUM SERPL-SCNC: 3.7 MMOL/L (ref 3.5–5.3)
PROT SERPL-MCNC: 6.9 G/DL (ref 6.4–8.2)
RBC # BLD AUTO: 4.38 MILLION/UL (ref 3.81–5.12)
SODIUM SERPL-SCNC: 143 MMOL/L (ref 136–145)
TRIGL SERPL-MCNC: 154 MG/DL
TSH SERPL DL<=0.05 MIU/L-ACNC: 3.14 UIU/ML (ref 0.36–3.74)
WBC # BLD AUTO: 6.06 THOUSAND/UL (ref 4.31–10.16)

## 2018-06-05 PROCEDURE — 83036 HEMOGLOBIN GLYCOSYLATED A1C: CPT | Performed by: FAMILY MEDICINE

## 2018-06-05 PROCEDURE — 80061 LIPID PANEL: CPT | Performed by: FAMILY MEDICINE

## 2018-06-05 PROCEDURE — 85025 COMPLETE CBC W/AUTO DIFF WBC: CPT | Performed by: FAMILY MEDICINE

## 2018-06-05 PROCEDURE — 36415 COLL VENOUS BLD VENIPUNCTURE: CPT | Performed by: FAMILY MEDICINE

## 2018-06-05 PROCEDURE — 80053 COMPREHEN METABOLIC PANEL: CPT | Performed by: FAMILY MEDICINE

## 2018-06-05 PROCEDURE — 84443 ASSAY THYROID STIM HORMONE: CPT | Performed by: FAMILY MEDICINE

## 2018-06-12 DIAGNOSIS — J45.20 MILD INTERMITTENT ASTHMA WITHOUT COMPLICATION: ICD-10-CM

## 2018-07-05 ENCOUNTER — TELEPHONE (OUTPATIENT)
Dept: FAMILY MEDICINE CLINIC | Facility: CLINIC | Age: 68
End: 2018-07-05

## 2018-07-05 NOTE — TELEPHONE ENCOUNTER
Pt erick left msg on front line I called her back  She said she had fallen on cement and hit her head about 330-4p has nice size goose egg and some out scratches  At the time pt having no symptoms  Did speak with dr Sarah Odom about this if she was ok to wait til tmw for appt  He said as long as she was having no sxs she really didn't need to be seen but up to her  If sxs start to appear dizziness blurred vision headache nausea er asap   Pt aware

## 2018-07-13 ENCOUNTER — TELEPHONE (OUTPATIENT)
Dept: FAMILY MEDICINE CLINIC | Facility: CLINIC | Age: 68
End: 2018-07-13

## 2018-07-13 NOTE — TELEPHONE ENCOUNTER
I called and left a message for Joel Cervantes on her aj phone consent ok  Informing that I was calling [facility] we received notification she was recently discharged and admitted to a rehab  facility I informed her we will call once she is discharged  Pt is to call with nay questions  No TCM scheduled at this time

## 2018-07-13 NOTE — TELEPHONE ENCOUNTER
----- Message from Claudia Rod sent at 7/12/2018  3:55 PM EDT -----  Regarding: PT NEEDS TCM  Pt discharged today 7/12/2018 from [facility] pt fell

## 2018-07-18 ENCOUNTER — TRANSITIONAL CARE MANAGEMENT (OUTPATIENT)
Dept: FAMILY MEDICINE CLINIC | Facility: CLINIC | Age: 68
End: 2018-07-18

## 2018-07-20 DIAGNOSIS — J45.20 MILD INTERMITTENT ASTHMA WITHOUT COMPLICATION: ICD-10-CM

## 2018-07-25 ENCOUNTER — DOCUMENTATION (OUTPATIENT)
Dept: FAMILY MEDICINE CLINIC | Facility: CLINIC | Age: 68
End: 2018-07-25

## 2018-07-25 ENCOUNTER — TRANSITIONAL CARE MANAGEMENT (OUTPATIENT)
Dept: FAMILY MEDICINE CLINIC | Facility: CLINIC | Age: 68
End: 2018-07-25

## 2018-07-25 NOTE — PROGRESS NOTES
[address], ID# [id number]  PT PREVIOUSLY TRIED PULIMCORT AND ASTHMANET  PT ALSO STATES SHE HAS DIFFICULTY WITH CERTAIN INHALERS DUE TO NEUROPATHY IN HER HANDS

## 2018-07-26 RX ORDER — DOCUSATE SODIUM 100 MG/1
100 CAPSULE, LIQUID FILLED ORAL AS NEEDED
COMMUNITY
Start: 2018-07-24 | End: 2019-01-01 | Stop reason: ALTCHOICE

## 2018-07-26 RX ORDER — ERYTHROMYCIN 5 MG/G
OINTMENT OPHTHALMIC
Refills: 0 | COMMUNITY
Start: 2018-05-30 | End: 2019-01-01 | Stop reason: ALTCHOICE

## 2018-07-26 RX ORDER — FLUTICASONE PROPIONATE 50 MCG
2 SPRAY, SUSPENSION (ML) NASAL
COMMUNITY
Start: 2018-07-24 | End: 2018-10-25 | Stop reason: SDUPTHER

## 2018-07-26 RX ORDER — SENNA PLUS 8.6 MG/1
8.6 TABLET ORAL DAILY
COMMUNITY
Start: 2018-07-24 | End: 2019-01-01 | Stop reason: ALTCHOICE

## 2018-07-27 ENCOUNTER — OFFICE VISIT (OUTPATIENT)
Dept: FAMILY MEDICINE CLINIC | Facility: CLINIC | Age: 68
End: 2018-07-27
Payer: MEDICARE

## 2018-07-27 VITALS
DIASTOLIC BLOOD PRESSURE: 84 MMHG | HEIGHT: 61 IN | WEIGHT: 143 LBS | OXYGEN SATURATION: 97 % | HEART RATE: 102 BPM | TEMPERATURE: 98 F | RESPIRATION RATE: 15 BRPM | SYSTOLIC BLOOD PRESSURE: 142 MMHG | BODY MASS INDEX: 27 KG/M2

## 2018-07-27 DIAGNOSIS — J45.40 MODERATE PERSISTENT ASTHMA WITHOUT COMPLICATION: ICD-10-CM

## 2018-07-27 DIAGNOSIS — G71.00 MUSCULAR DYSTROPHY (HCC): ICD-10-CM

## 2018-07-27 DIAGNOSIS — R26.9 GAIT ABNORMALITY: Primary | ICD-10-CM

## 2018-07-27 DIAGNOSIS — S06.0X0D CONCUSSION WITHOUT LOSS OF CONSCIOUSNESS, SUBSEQUENT ENCOUNTER: ICD-10-CM

## 2018-07-27 PROBLEM — S06.0X9A CONCUSSION: Status: ACTIVE | Noted: 2018-07-12

## 2018-07-27 PROBLEM — G60.0 CHARCOT-MARIE DISEASE: Status: ACTIVE | Noted: 2018-07-13

## 2018-07-27 PROBLEM — R51.9 HEADACHE: Status: ACTIVE | Noted: 2018-07-11

## 2018-07-27 PROBLEM — J45.901 ASTHMA WITH ACUTE EXACERBATION: Status: ACTIVE | Noted: 2018-07-10

## 2018-07-27 PROBLEM — R42 DIZZINESS: Status: ACTIVE | Noted: 2018-07-11

## 2018-07-27 PROCEDURE — 99496 TRANSJ CARE MGMT HIGH F2F 7D: CPT | Performed by: FAMILY MEDICINE

## 2018-07-27 RX ORDER — ACETAMINOPHEN/DIPHENHYDRAMINE 500MG-25MG
81 TABLET ORAL DAILY
Refills: 0 | COMMUNITY
Start: 2018-07-24 | End: 2018-10-25 | Stop reason: SDUPTHER

## 2018-07-27 RX ORDER — BECLOMETHASONE DIPROPIONATE HFA 80 UG/1
2 AEROSOL, METERED RESPIRATORY (INHALATION) 2 TIMES DAILY
Refills: 0 | COMMUNITY
Start: 2018-07-25 | End: 2018-10-25 | Stop reason: SDUPTHER

## 2018-07-27 NOTE — PROGRESS NOTES
Assessment/Plan:    Hospital records and rehab records reviewed with patient  She apparently has made good progress since her initial presentation for her closed head injury  Her speech is fluent her only significant complaint is headache which is mild  Her balance is not back to baseline yet  Will refer her to outpatient physical therapy for gait training and reconditioning    Her asthma has stabilized  She will continue on her usual Qvar inhaler daily  Diagnoses and all orders for this visit:    Gait abnormality  -     Ambulatory referral to Physical Therapy; Future    Concussion without loss of consciousness, subsequent encounter  -     Ambulatory referral to Physical Therapy; Future    Moderate persistent asthma without complication    Muscular dystrophy ([facility] [address] )    Other orders  -     RA ASPIRIN EC ADULT LOW ST 81 MG EC tablet; Take 81 mg by mouth daily  -     QVAR REDIHALER 80 MCG/ACT inhaler; Inhale 2 puffs 2 (two) times a day          Subjective:      Patient ID: Devorah Lim is a 70 y o  female  Date and time hospital follow up call was made:  7/18/2018  9:34 AM  Hospital care reviewed:  Records reviewed  Patient was hopsitalized at:  [facility]  Date of admission:  7/12/18  Date of discharge:  7/25/18  Diagnosis:  Concussion without loss of consciousness, subsequent encounter   (Primary Dx); Charcot-Rashida disease  Disposition:  Home health services  Were the patients medicaitons reviewed and updated:  Yes  Current symptoms:  Fatigue  Fatigue severity:  Mild  Post hospital issues:  Reduced activity  Should patient be enrolled in anticoag monitoring?:  No  Scheduled for follow up?:  Yes  Did you obtain your prescribed medications:  Yes  Do you need help managing your perscriptions or medications:  No  Is transportation to your appointments needed:  No  I have advised the patient to call PCP with any new or worsening symptoms   (please type in name along with any credentials):  Donna Stauffer Filiberto ARREGUIN   Comments:  Pt is scheduled for a TCM in our office on 7/27/18  Patient presents in follow-up from recent hospitalization and rehab stay  She was admitted to [facility] on 07/12 through 7/15 and then transferred to rehabilitation  She was discharged from rehabilitation on 07/25  Her initial reason for admission was a fall resulting in a closed head injury/concussion  She was medically stabilized and transferred to rehab where she made good progress over her stay with speech, occupational and physical therapy  She did have a slight asthmatic exacerbation which was treated with a steroid taper and has returned her to her baseline  At present she presents feeling well  She is using a walker but she states that she does not feel that she really needs it and does not use it at home  She has only occasional balance issues  Her speech and cognition are intact by her estimation  The following portions of the patient's history were reviewed and updated as appropriate: allergies, current medications, past family history, past medical history, past social history, past surgical history and problem list     Review of Systems   Respiratory: Negative  Cardiovascular: Negative  Gastrointestinal: Negative  Genitourinary: Negative  Musculoskeletal: Negative  Neurological: Positive for dizziness and weakness  Psychiatric/Behavioral: Negative  Objective:      /84 (BP Location: Left arm, Patient Position: Sitting, Cuff Size: Adult)   Pulse 102   Temp 98 °F (36 7 °C)   Resp 15   Ht 5' 1" (1 549 m)   Wt 64 9 kg (143 lb)   SpO2 97%   Breastfeeding? No   BMI 27 02 kg/m²          Physical Exam   Constitutional: She is oriented to person, place, and time  She appears well-developed and well-nourished  No distress  HENT:   Head: Normocephalic and atraumatic  Eyes: Conjunctivae are normal  Pupils are equal, round, and reactive to light   Right eye exhibits no discharge  Neck: Normal range of motion  No thyromegaly present  Cardiovascular: Normal rate and regular rhythm  Pulmonary/Chest: Effort normal and breath sounds normal  No respiratory distress  Lymphadenopathy:     She has no cervical adenopathy  Neurological: She is alert and oriented to person, place, and time  Skin: Skin is warm and dry  She is not diaphoretic  Psychiatric: She has a normal mood and affect  Her behavior is normal  Judgment and thought content normal    Nursing note and vitals reviewed

## 2018-07-30 ENCOUNTER — EVALUATION (OUTPATIENT)
Dept: PHYSICAL THERAPY | Facility: CLINIC | Age: 68
End: 2018-07-30
Payer: MEDICARE

## 2018-07-30 DIAGNOSIS — R29.898 WEAKNESS OF BOTH LEGS: Primary | ICD-10-CM

## 2018-07-30 DIAGNOSIS — R26.9 GAIT ABNORMALITY: ICD-10-CM

## 2018-07-30 PROCEDURE — G8979 MOBILITY GOAL STATUS: HCPCS

## 2018-07-30 PROCEDURE — 97162 PT EVAL MOD COMPLEX 30 MIN: CPT

## 2018-07-30 PROCEDURE — G8978 MOBILITY CURRENT STATUS: HCPCS

## 2018-07-30 NOTE — PROGRESS NOTES
PT Evaluation     Today's date: 2018  Patient name: Cassie Jack  : 1950  MRN: [id number]  Referring provider: Lamar Esparza DO  Dx:   Encounter Diagnosis     ICD-10-CM    1  Weakness of both legs R29 898    2  Gait abnormality R26 9                   Assessment  Impairments: abnormal gait, abnormal muscle firing, abnormal muscle tone, abnormal or restricted ROM, activity intolerance, impaired balance, impaired physical strength, lacks appropriate home exercise program, safety issue and poor posture   Functional limitations: difficulty completing household chores, unable to ascend/descend stairs, difficulty ambulating in the community, difficulty completing transfers  Assessment details: Cassie Jack is a 70 y o  female presenting to PT with decreased BLE strength, balance deficits, and decreased tolerance to activity secondary to weakness acquired from immobility due to hospitalization  Patient's history of CMT disease also plays a role in decreasing strength, and patient would benefit from skilled PT services to address these impairments and to maximize function in order to improve quality of life  Thank you for the referral   Understanding of Dx/Px/POC: good   Prognosis: good    Goals  STG  1  Patient will demonstrate 3 second improved time on TUG test to facilitate improved safety in all ambulation  2  Patient will be independent in basic HEP 2-3 weeks  3  B/L hip and knee strength will improve 1/2 grade in 4 weeks  LTG  1  Patient will be independent in a comprehensive home exercise program   2  Patient will demonstrate improvement to WNL on mCTSIB test   3  B/L hip and knee strength will improve to at least 4/5 within 8 weeks  4  Patient will ascend/descend one flight of stairs independently safely within 8 weeks  5  Patient will perform all ADLs she was performing prior to injury within 8 weeks      Plan  Patient would benefit from: skilled physical therapy  Planned therapy interventions: abdominal trunk stabilization, balance/weight bearing training, functional ROM exercises, gait training, home exercise program, therapeutic exercise, therapeutic activities, transfer training, stretching, strengthening, patient education, neuromuscular re-education and manual therapy  Frequency: 2x week  Duration in weeks: 8  Treatment plan discussed with: patient        Subjective Evaluation    History of Present Illness  Date of onset: 7/5/2018  Mechanism of injury: Patient presents to therapy after she tripped and fell on 7/5/18 while walking on the sidewalk outside the store, and sustained a concussion without LOC  She was admitted to hospital and discharged after 3 days  She states her oxygen levels were low, and she denies any intracranial hemorrhage  She was discharged to a rehab facility where she worked on LE strengthening and balance  She denies headaches or dizziness  She also states she has no pain  She also has a history of Charcot-Rashida-Tooth Disease, 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    Quality of life: good    Pain  No pain reported  Progression: no change    Treatments  Discharged from (in last 30 days): inpatient hospitalization and skilled nursing facility  Patient Goals  Patient goals for therapy: improved balance, increased motion, increased strength and independence with ADLs/IADLs  Patient goal: strength and stability of LEs, walk up and down steps        Objective     Neurological Testing     Additional Neurological Details  BLE intact to light touch    Strength/Myotome Testing     Left Hip   Planes of Motion   Flexion: 3+  Extension: 3+  Abduction: 3+  Adduction: 3+    Isolated Muscles   Gluteus lauryn: 3+  Gluteus medius: 3    Right Hip   Planes of Motion   Flexion: 3+  Extension: 3+  Abduction: 3+  Adduction: 3+    Isolated Muscles   Gluteus maximums: 3+  Gluteus medius: 3    Left Knee   Flexion: 3+  Extension: 3+  Quadriceps contraction: fair    Right Knee   Flexion: 3+  Extension: 3+  Quadriceps contraction: fair    Left Ankle/Foot   Dorsiflexion: 2+  Plantar flexion: 3    Right Ankle/Foot   Dorsiflexion: 2+  Plantar flexion: 3    Additional Strength Details  Limited DF strength B/L due to CMT  Patient wears B/L AFO to assist with ambulation    Ambulation     Comments   Ambulates FWB with RW for safety due to balance and strength deficits    General Comments     Hip Comments   TUG test: 15 seconds  mCTSIB  EO firm: 0 78  EC firm: 2 21  EO foam: 1 61  EC foam: DNC      Flowsheet Rows      Most Recent Value   PT/OT G-Codes   Current Score  64   Projected Score  77   FOTO information reviewed  Yes   Assessment Type  Evaluation   G code set  Mobility: Walking & Moving Around   Mobility: Walking and Moving Around Current Status ()  CJ   Mobility: Walking and Moving Around Goal Status ()  CJ          Precautions: Charcot-Rashida-Tooth Disease, osteoporosis, anxiety    Daily Treatment Diary       Manuals                                                                 Exercise Diary              Bike             Mini squats             Standing SLR extension             Standing SLR abduction             Standing march             Seated HR             Seated hip abd/add                          Biodex             Rockerboard balance AP/ML             Standing balance on firm             Standing balance on foam             Tandem balance                                                                                                                                                            Modalities

## 2018-08-02 ENCOUNTER — OFFICE VISIT (OUTPATIENT)
Dept: PHYSICAL THERAPY | Facility: CLINIC | Age: 68
End: 2018-08-02
Payer: MEDICARE

## 2018-08-02 DIAGNOSIS — R29.898 WEAKNESS OF BOTH LEGS: Primary | ICD-10-CM

## 2018-08-02 DIAGNOSIS — R26.9 GAIT ABNORMALITY: ICD-10-CM

## 2018-08-02 PROCEDURE — 97110 THERAPEUTIC EXERCISES: CPT

## 2018-08-02 PROCEDURE — 97112 NEUROMUSCULAR REEDUCATION: CPT

## 2018-08-02 NOTE — PROGRESS NOTES
Daily Note     Today's date: 2018  Patient name: Rigo Fischer  : 1950  MRN: [id number]  Referring provider: Karly Diaz DO  Dx:   Encounter Diagnosis     ICD-10-CM    1  Weakness of both legs R29 898    2  Gait abnormality R26 9                   Subjective: Patient reports she adjusted reps on her home exercises due to R side LBP with hip extensions  Otherwise she has no issues to report  Objective: See treatment diary below  Assessment: Tolerated treatment well  Good performance of new exercises today, tolerating increased volume and of activity with no increase in pain or fatigue  Patient does report fatigue by end of session  Exhibits proper gait pattern independently throughout the clinic, with distant supervision for safety  Patient demonstrated fatigue post treatment, exhibited good technique with therapeutic exercises and would benefit from continued PT      Plan: Continue per plan of care  Progress treatment as tolerated  Precautions: Charcot-Rashida-Tooth Disease, osteoporosis, anxiety     Daily Treatment Diary         Manuals                                                                                                                       Exercise Diary                        Bike 5'                     Mini squats 20x                     Standing SLR extension 2x10                     Standing SLR abduction 2x10                     Standing march 2x10                     Seated HR 30x                     Seated hip abd/add Belt 3"x20                     Seated march 3"x20                     LAQ 3"x20            Biodex LOS easy x2 (5')  Wt   Shift (3')                     Rockerboard balance AP/ML NP                     Standing balance on firm NV                     Standing balance on foam NP                     Tandem balance NP                                                                     Gait training I, 50 ft x3                                                                                                                                                                                                                     Modalities

## 2018-08-07 ENCOUNTER — OFFICE VISIT (OUTPATIENT)
Dept: PHYSICAL THERAPY | Facility: CLINIC | Age: 68
End: 2018-08-07
Payer: MEDICARE

## 2018-08-07 ENCOUNTER — TELEPHONE (OUTPATIENT)
Dept: FAMILY MEDICINE CLINIC | Facility: CLINIC | Age: 68
End: 2018-08-07

## 2018-08-07 DIAGNOSIS — R26.9 GAIT ABNORMALITY: ICD-10-CM

## 2018-08-07 DIAGNOSIS — R29.898 WEAKNESS OF BOTH LEGS: Primary | ICD-10-CM

## 2018-08-07 PROCEDURE — 97112 NEUROMUSCULAR REEDUCATION: CPT

## 2018-08-07 PROCEDURE — 97110 THERAPEUTIC EXERCISES: CPT

## 2018-08-07 NOTE — TELEPHONE ENCOUNTER
Pt came into the office  She stated she has been going to Physical Therapy on the other side of our building  She said Dr Sandra Frias is sending her to Physical Therapy and last time she was in to see him she forgot to ask if she could drive  She stated he didn't say she couldn't but before she does she wants to make sure it is ok  Pt would like a call at [phone] to let her know

## 2018-08-07 NOTE — PROGRESS NOTES
Daily Note     Today's date: 2018  Patient name: Valdemar [place]  : 1950  MRN: [id number]  Referring provider: Elaine Ballesteros DO  Dx:   Encounter Diagnosis     ICD-10-CM    1  Weakness of both legs R29 898    2  Gait abnormality R26 9        Start Time: 1350  Stop Time: 1430  Total time in clinic (min): 40 minutes    Subjective: Patient reports she feels safer without her cane  Pt reports she is feeling ok today  Objective: See treatment diary below  Assessment: Tolerated treatment well  Patient demonstrated fatigue post treatment, exhibited good technique with therapeutic exercises and would benefit from continued PT  Pt continues to show challenge with current exercise program  Pt needed mod VCing for technique with biodex balance activities  Pt will benefit from further skilled PT to increase strength, flexibility and function  Continue to progress as able  Plan: Continue per plan of care  Progress treatment as tolerated  Precautions: Charcot-Rashida-Tooth Disease, osteoporosis, anxiety     Daily Treatment Diary         Manuals                                                                                                                     Exercise Diary                        Bike 5'  5'                   Mini squats 20x  20x                   Standing SLR extension 2x10  2x10                   Standing SLR abduction 2x10  2x10                   Standing march 2x10  20x                   Seated HR 30x  30x                   Seated hip abd/add Belt 3"x20  Belt 3"x20                   Seated march 3"x20  3"x20                   LAQ 3"x20 20x3"           Biodex LOS easy x2 (5')  Wt  Shift (3')  LOS easy x2 (5')  Wt   Shift (3')                   Rockerboard balance AP/ML NP np                   Standing balance on firm NV  np                   Standing balance on foam NP  np                   Tandem balance NP  np                                                                   Gait training I, 50 ft x3  3x50'                                                                                                                                                                                                                   Modalities

## 2018-08-07 NOTE — TELEPHONE ENCOUNTER
I cannot determine If she can drive or not, as I did not evaluate her and do not feel comfortable making that decision

## 2018-08-08 ENCOUNTER — OFFICE VISIT (OUTPATIENT)
Dept: PHYSICAL THERAPY | Facility: CLINIC | Age: 68
End: 2018-08-08
Payer: MEDICARE

## 2018-08-08 DIAGNOSIS — R26.9 GAIT ABNORMALITY: ICD-10-CM

## 2018-08-08 DIAGNOSIS — R29.898 WEAKNESS OF BOTH LEGS: Primary | ICD-10-CM

## 2018-08-08 PROCEDURE — 97112 NEUROMUSCULAR REEDUCATION: CPT

## 2018-08-08 PROCEDURE — 97110 THERAPEUTIC EXERCISES: CPT

## 2018-08-08 NOTE — PROGRESS NOTES
Daily Note     Today's date: 2018  Patient name: Devika Kidd  : 1950  MRN: [id number]  Referring provider: Marleny Nicole DO  Dx:   Encounter Diagnosis     ICD-10-CM    1  Weakness of both legs R29 898    2  Gait abnormality R26 9                   Subjective: Patient reported mild increase in R hip / LB soreness following previous treatment  Presented with general fatigue and asthma related symptoms due to the humidity  Objective: See treatment diary below  Progressed balance  Assessment: Minimal use of SPC for amb, carrying AD in her hand  Requested recumbent bike post treatment as starting prior to treatment affects asthma symptoms, only completing 3 min today due to her pants being too restricted  Progressed balance exercises with minimal sway, no significant LOB  Challenged with posterior weight shifting on Biodex  No significant difficulty completing seated or standing program       Plan: Continue per plan of care  Progress treatment as tolerated  Precautions: Charcot-Rashida-Tooth Disease, osteoporosis, anxiety     Daily Treatment Diary   Manuals                                                                                                                   Exercise Diary                        Bike 5'  5'  3 min                 Mini squats 20x  20x  x20                 Standing SLR extension 2x10  2x10  2x10                 Standing SLR abduction 2x10  2x10  2x10                 Standing march 2x10  20x  x20                 Seated HR 30x  30x  x30                 Seated hip abd/add Belt 3"x20  Belt 3"x20  belt 3"x20                 Seated march 3"x20  3"x20  3"x20                 LAQ 3"x20 20x3"  3"x20          Biodex LOS easy x2 (5')  Wt  Shift (3')  LOS easy x2 (5')  Wt   Shift (3')  LOS easy x2, weight shifting3 min                 Rockerboard balance AP/ML NP np  NP                 Standing balance on firm NV  np  NP                 Standing balance on foam NP  np  30"x2                 Tandem balance NP  np  30"x1 ea                                                                 Gait training I, 50 ft x3  3x50'  50 ft x3                                                                                                                                                                                                                 Modalities

## 2018-08-09 ENCOUNTER — APPOINTMENT (OUTPATIENT)
Dept: PHYSICAL THERAPY | Facility: CLINIC | Age: 68
End: 2018-08-09
Payer: MEDICARE

## 2018-08-13 ENCOUNTER — OFFICE VISIT (OUTPATIENT)
Dept: PHYSICAL THERAPY | Facility: CLINIC | Age: 68
End: 2018-08-13
Payer: MEDICARE

## 2018-08-13 DIAGNOSIS — R29.898 WEAKNESS OF BOTH LEGS: Primary | ICD-10-CM

## 2018-08-13 DIAGNOSIS — R26.9 GAIT ABNORMALITY: ICD-10-CM

## 2018-08-13 PROCEDURE — 97110 THERAPEUTIC EXERCISES: CPT

## 2018-08-13 PROCEDURE — 97112 NEUROMUSCULAR REEDUCATION: CPT

## 2018-08-13 NOTE — PROGRESS NOTES
Daily Note     Today's date: 2018  Patient name: Byron Morataya  : 1950  MRN: [id number]  Referring provider: Flo Morel DO  Dx:   Encounter Diagnosis     ICD-10-CM    1  Weakness of both legs R29 898    2  Gait abnormality R26 9                   Subjective: Patient presents ambulating independently, stating she is comfortable leaving her cane at home  Objective: See treatment diary below  Assessment: Patient tolerates treatment well  She requests not using recumbent bike due to discomfort from her jeans when she is pedaling  Able to tolerate progression of balance exercises with minimal to no sway, and no LOB  Added step ups into program today, with patient exhibiting more weakness of RLE compared with left  Plan: Continue per plan of care  Progress treatment as tolerated  Precautions: Charcot-Rashida-Tooth Disease, osteoporosis, anxiety     Daily Treatment Diary   Manuals                                                                                                                 Exercise Diary                        Bike 5' 5' 3 min TM 3' @ 1 0 mph               Mini squats 20x 20x x20 2x10               Standing SLR extension 2x10 2x10 2x10 3x10               Standing SLR abduction 2x10 2x10 2x10 3x10               Standing march 2x10 20x x20 3x10               Seated HR 30x 30x x30 30x               Seated hip abd/add Belt 3"x20 Belt 3"x20 belt 3"x20 Belt 3"x30               Seated march 3"x20 3"x20 3"x20 3"x30               LAQ 3"x20 20x3" 3"x20 3"x30         Biodex LOS easy x2 (5')  Wt  Shift (3') LOS easy x2 (5')  Wt   Shift (3') LOS easy x2, weight shifting3 min LOS easy x3, weight shift, 3 min               Rockerboard balance AP/ML NP np NP NP               Standing balance on firm NV np NP FT x1'               Standing balance on foam NP np 30"x2 1 min x2               Tandem balance NP np 30"x1 ea 1 min each               Step ups       4" 10x each                                      Gait training I, 50 ft x3 3x50' 50 ft x3 3 min                                                                                                                                                                                                               Modalities

## 2018-08-15 ENCOUNTER — OFFICE VISIT (OUTPATIENT)
Dept: PHYSICAL THERAPY | Facility: CLINIC | Age: 68
End: 2018-08-15
Payer: MEDICARE

## 2018-08-15 DIAGNOSIS — R29.898 WEAKNESS OF BOTH LEGS: Primary | ICD-10-CM

## 2018-08-15 DIAGNOSIS — R26.9 GAIT ABNORMALITY: ICD-10-CM

## 2018-08-15 PROCEDURE — 97110 THERAPEUTIC EXERCISES: CPT

## 2018-08-15 PROCEDURE — 97112 NEUROMUSCULAR REEDUCATION: CPT

## 2018-08-15 NOTE — PROGRESS NOTES
Daily Note     Today's date: 8/15/2018  Patient name: Devika Kidd  : 1950  MRN: [id number]  Referring provider: Marleny Nicole DO  Dx:   Encounter Diagnosis     ICD-10-CM    1  Weakness of both legs R29 898    2  Gait abnormality R26 9                   Subjective: Patient reports improved gait mechanics when ambulating around her house  Objective: See treatment diary below  Assessment: Patient tolerates treatment well  Good performance of strengthening program today and noted improvement in ambulation, however she still continues with R Trendelenburg during gait due to weakness  Plan: Continue per plan of care  Progress treatment as tolerated  Precautions: Charcot-Rashida-Tooth Disease, osteoporosis, anxiety     Daily Treatment Diary   Manuals  8/2  8/7  8/8  8/13  8/15                                                                                                             Exercise Diary                        Bike 5' 5' 3 min TM 3' @ 1 0 mph TM 4' @ 1 mph             Mini squats 20x 20x x20 2x10 3x10             Standing SLR extension 2x10 2x10 2x10 3x10 3x10             Standing SLR abduction 2x10 2x10 2x10 3x10 3x10             Standing march 2x10 20x x20 3x10 3x10             Seated HR 30x 30x x30 30x 30x             Seated hip abd/add Belt 3"x20 Belt 3"x20 belt 3"x20 Belt 3"x30 GTB 3"x30             Seated march 3"x20 3"x20 3"x20 3"x30 1#x30             LAQ 3"x20 20x3" 3"x20 3"x30 1#x30        Biodex LOS easy x2 (5')  Wt  Shift (3') LOS easy x2 (5')  Wt   Shift (3') LOS easy x2, weight shifting3 min LOS easy x3, weight shift, 3 min LOS easy x3, wt shift, 3 min             Rockerboard balance AP/ML NP np NP NP              Standing balance on firm NV np NP FT x1' FT 2x30"             Standing balance on foam NP np 30"x2 1 min x2 2 x 1 min             Tandem balance NP np 30"x1 ea 1 min each 1 min each             Step ups       4" 10x each 4" 10x each                                    Gait training I, 50 ft x3 3x50' 50 ft x3 3 min 4 min                                                                                                                                                                                                             Modalities

## 2018-08-16 ENCOUNTER — TELEPHONE (OUTPATIENT)
Dept: FAMILY MEDICINE CLINIC | Facility: CLINIC | Age: 68
End: 2018-08-16

## 2018-08-16 NOTE — TELEPHONE ENCOUNTER
It is okay for her to drive as long she does not have any vision problems, unusual dizziness or confusion

## 2018-08-16 NOTE — TELEPHONE ENCOUNTER
Pt called stating Dr Addison Jeewll had sent her to physical therapy and she had a head concussion  She said he didn't tell her she couldn't drive, but she wanted to check before she did  Please call pt at [phone] and let her know if she can drive

## 2018-08-18 DIAGNOSIS — J45.40 MODERATE PERSISTENT ASTHMA, UNSPECIFIED WHETHER COMPLICATED: ICD-10-CM

## 2018-08-18 RX ORDER — MONTELUKAST SODIUM 10 MG/1
TABLET ORAL
Qty: 30 TABLET | Refills: 5 | Status: SHIPPED | OUTPATIENT
Start: 2018-08-18 | End: 2019-01-01 | Stop reason: SDUPTHER

## 2018-08-20 ENCOUNTER — OFFICE VISIT (OUTPATIENT)
Dept: PHYSICAL THERAPY | Facility: CLINIC | Age: 68
End: 2018-08-20
Payer: MEDICARE

## 2018-08-20 DIAGNOSIS — R26.9 GAIT ABNORMALITY: ICD-10-CM

## 2018-08-20 DIAGNOSIS — R29.898 WEAKNESS OF BOTH LEGS: Primary | ICD-10-CM

## 2018-08-20 PROCEDURE — 97110 THERAPEUTIC EXERCISES: CPT

## 2018-08-20 PROCEDURE — G8979 MOBILITY GOAL STATUS: HCPCS

## 2018-08-20 PROCEDURE — 97112 NEUROMUSCULAR REEDUCATION: CPT

## 2018-08-20 PROCEDURE — G8978 MOBILITY CURRENT STATUS: HCPCS

## 2018-08-20 NOTE — PROGRESS NOTES
Daily Note     Today's date: 2018  Patient name: Yonis Reed  : 1950  MRN: [id number]  Referring provider: Aleah Juarez DO  Dx:   Encounter Diagnosis     ICD-10-CM    1  Weakness of both legs R29 898    2  Gait abnormality R26 9                   Subjective: Patient reports she feels stronger in her legs and she bought ankle weights to use for HEP  Objective: See treatment diary below  Noted painless popping in posterior/medial R knee at terminal knee extension  Assessment: Patient tolerates treatment well  No pain reported with R knee popping  Good performance of strengthening program, with ability to tolerate increased volume and duration of activity today  Patient would benefit from continued course of skilled therapy in an effort to improve function  Plan: Continue per plan of care  Progress treatment as tolerated  Precautions: Charcot-Rashida-Tooth Disease, osteoporosis, anxiety     Daily Treatment Diary   Manuals  8/2  8/7  8/8  8/13  8/15  8/20                                                                                                           Exercise Diary                        Bike 5' 5' 3 min TM 3' @ 1 0 mph TM 4' @ 1 mph TM 5' @ 1 5 mph           Mini squats 20x 20x x20 2x10 3x10 3x10           Standing SLR extension 2x10 2x10 2x10 3x10 3x10 1 5# x20           Standing SLR abduction 2x10 2x10 2x10 3x10 3x10 1 5# x20           Standing march 2x10 20x x20 3x10 3x10 1 5# x20           Seated HR 30x 30x x30 30x 30x 30x           Seated hip abd/add Belt 3"x20 Belt 3"x20 belt 3"x20 Belt 3"x30 GTB 3"x30 GTB 3"x30           Seated march 3"x20 3"x20 3"x20 3"x30 1#x30 1 5# x30           LAQ 3"x20 20x3" 3"x20 3"x30 1#x30 1 5# x30       Biodex LOS easy x2 (5')  Wt  Shift (3') LOS easy x2 (5')  Wt   Shift (3') LOS easy x2, weight shifting3 min LOS easy x3, weight shift, 3 min LOS easy x3, wt shift, 3 min NV           Rockerboard balance AP/ML NP np NP NP  NP         Standing balance on firm NV np NP FT x1' FT 2x30"            Standing balance on foam NP np 30"x2 1 min x2 2 x 1 min 2 x 1 min           Tandem balance NP np 30"x1 ea 1 min each 1 min each 1 min each           Step ups       4" 10x each 4" 10x each 4" 15x each                                  Gait training I, 50 ft x3 3x50' 50 ft x3 3 min 4 min 5 min                                                                                                                                                                                                           Modalities

## 2018-08-23 ENCOUNTER — APPOINTMENT (OUTPATIENT)
Dept: PHYSICAL THERAPY | Facility: CLINIC | Age: 68
End: 2018-08-23
Payer: MEDICARE

## 2018-08-27 ENCOUNTER — OFFICE VISIT (OUTPATIENT)
Dept: PHYSICAL THERAPY | Facility: CLINIC | Age: 68
End: 2018-08-27
Payer: MEDICARE

## 2018-08-27 DIAGNOSIS — R29.898 WEAKNESS OF BOTH LEGS: ICD-10-CM

## 2018-08-27 DIAGNOSIS — R26.9 GAIT ABNORMALITY: Primary | ICD-10-CM

## 2018-08-27 PROCEDURE — 97110 THERAPEUTIC EXERCISES: CPT | Performed by: PHYSICAL THERAPIST

## 2018-08-27 PROCEDURE — 97112 NEUROMUSCULAR REEDUCATION: CPT | Performed by: PHYSICAL THERAPIST

## 2018-08-27 NOTE — PROGRESS NOTES
Daily Note     Today's date: 2018  Patient name: Valdemar Durham  : 1950  MRN: [id number]  Referring provider: Elaine Ballesteros DO  Dx:   Encounter Diagnosis     ICD-10-CM    1  Gait abnormality R26 9    2  Weakness of both legs R29 898                   Subjective:       Objective: See treatment diary below        Precautions: Charcot-Rashida-Tooth Disease, osteoporosis, anxiety     Daily Treatment Diary   Manuals  8/2  8/7  8/8  8/13  8/15  8/20  8/27                                                                                                         Exercise Diary                        Bike 5' 5' 3 min TM 3' @ 1 0 mph TM 4' @ 1 mph TM 5' @ 1 5 mph  TM [email or web address] mph         Mini squats 20x 20x x20 2x10 3x10 3x10  3x10         Standing SLR extension 2x10 2x10 2x10 3x10 3x10 1 5# x20  1 5#,20x         Standing SLR abduction 2x10 2x10 2x10 3x10 3x10 1 5# x20 1 5#x20         Standing march 2x10 20x x20 3x10 3x10 1 5# x20  1 5#x20         Seated HR 30x 30x x30 30x 30x 30x           Seated hip abd/add Belt 3"x20 Belt 3"x20 belt 3"x20 Belt 3"x30 GTB 3"x30 GTB 3"x30  GTB  3'x30  AD - TB use 30x         Seated march 3"x20 3"x20 3"x20 3"x30 1#x30 1 5# x30  1 5#x30         LAQ 3"x20 20x3" 3"x20 3"x30 1#x30 1 5# x30  1 5#  x30         Biodex LOS easy x2 (5')  Wt  Shift (3') LOS easy x2 (5')  Wt   Shift (3') LOS easy x2, weight shifting3 min LOS easy x3, weight shift, 3 min LOS easy x3, wt shift, 3 min NV  LOS  Easy  x3,wtshift,3min         Rockerboard balance AP/ML NP np NP NP   NP           Standing balance on firm NV np NP FT x1' FT 2x30"    FT  2x30"         Standing balance on foam NP np 30"x2 1 min x2 2 x 1 min 2 x 1 min           Tandem balance NP np 30"x1 ea 1 min each 1 min each 1 min each           Step ups       4" 10x each 4" 10x each 4" 15x each                                   Gait training I, 50 ft x3 3x50' 50 ft x3 3 min 4 min 5 min                                                                                                                                                                                                           Modalities                                                                             Assessment: Tolerated treatment well   Patient continue to work on LE strengthening and balance in standing using various ex  techniques in alon  range only  Some fatigue noted  All therex requires verbal and tactile cues  Pt  Is motivated  No discomfort after tx  Plan: Continue tx  as per ongoing POC

## 2018-08-30 ENCOUNTER — APPOINTMENT (OUTPATIENT)
Dept: PHYSICAL THERAPY | Facility: CLINIC | Age: 68
End: 2018-08-30
Payer: MEDICARE

## 2018-09-01 DIAGNOSIS — J45.20 MILD INTERMITTENT ASTHMA WITHOUT COMPLICATION: ICD-10-CM

## 2018-09-04 ENCOUNTER — APPOINTMENT (OUTPATIENT)
Dept: PHYSICAL THERAPY | Facility: CLINIC | Age: 68
End: 2018-09-04
Payer: MEDICARE

## 2018-09-06 ENCOUNTER — APPOINTMENT (OUTPATIENT)
Dept: PHYSICAL THERAPY | Facility: CLINIC | Age: 68
End: 2018-09-06
Payer: MEDICARE

## 2018-09-12 ENCOUNTER — EVALUATION (OUTPATIENT)
Dept: PHYSICAL THERAPY | Facility: CLINIC | Age: 68
End: 2018-09-12
Payer: MEDICARE

## 2018-09-12 DIAGNOSIS — R26.9 GAIT ABNORMALITY: Primary | ICD-10-CM

## 2018-09-12 DIAGNOSIS — R29.898 WEAKNESS OF BOTH LEGS: ICD-10-CM

## 2018-09-12 PROCEDURE — G8979 MOBILITY GOAL STATUS: HCPCS

## 2018-09-12 PROCEDURE — G8978 MOBILITY CURRENT STATUS: HCPCS

## 2018-09-12 PROCEDURE — 97110 THERAPEUTIC EXERCISES: CPT

## 2018-09-12 PROCEDURE — 97112 NEUROMUSCULAR REEDUCATION: CPT

## 2018-09-12 NOTE — PROGRESS NOTES
Daily Note     Today's date: 2018  Patient name: Vidal Rosas  : 1950  MRN: [id number]  Referring provider: Jose Luis Taveras DO  Dx:   Encounter Diagnosis     ICD-10-CM    1  Gait abnormality R26 9    2  Weakness of both legs R29 891                   Subjective: Patient reported her asthma has been bothering her since last treatment, having discomfort in chest  Not using AD for amb  Objective: See treatment diary below  Progressed LE strengthening  Assessment: Had decreased tolerance to program today, related to residual asthma symptoms  Seated rest breaks used during standing program  Minimal sway with balance on dynamic surface  Deferred some exercises below  Reduced time on treadmill due to patient tolerance  Plan: Continue with plan of care and progress as able         Precautions: Charcot-Rashida-Tooth Disease, osteoporosis, anxiety     Daily Treatment Diary   Manuals  8/2  8/7  8/8  8/13  8/15  8/20  8/27  9/12                                                                                                       Exercise Diary                        Bike 5' 5' 3 min TM 3' @ 1 0 mph TM 4' @ 1 mph TM 5' @ 1 5 mph  TM [email or web address] mph  TM   2 min, 1 5 mph       Mini squats 20x 20x x20 2x10 3x10 3x10  3x10  3x10       Standing SLR extension 2x10 2x10 2x10 3x10 3x10 1 5# x20  1 5#,20x  2#  x20       Standing SLR abduction 2x10 2x10 2x10 3x10 3x10 1 5# x20 1 5#x20  2#  x20       Standing march 2x10 20x x20 3x10 3x10 1 5# x20  1 5#x20  2#  x20       Seated HR 30x 30x x30 30x 30x 30x   NP       Seated hip abd/add Belt 3"x20 Belt 3"x20 belt 3"x20 Belt 3"x30 GTB 3"x30 GTB 3"x30  GTB  3'x30  AD - TB use 30x  Green 3"x30       Seated march 3"x20 3"x20 3"x20 3"x30 1#x30 1 5# x30  1 5#x30  2#  x20       LAQ 3"x20 20x3" 3"x20 3"x30 1#x30 1 5# x30  1 5#  x30  2#   x20       Biodex LOS easy x2 (5')  Wt  Shift (3') LOS easy x2 (5')  Wt   Shift (3') LOS easy x2, weight shifting3 min LOS easy x3, weight shift, 3 min LOS easy x3, wt shift, 3 min NV  LOS  Easy  x3,wtshift,3min  deferred       Rockerboard balance AP/ML NP np NP NP   NP    NV       Standing balance on firm  NV np NP FT x1' FT 2x30"    FT  2x30"  NP       Standing balance on foam NP np 30"x2 1 min x2 2 x 1 min 2 x 1 min    1 min   x2       Tandem balance NP np 30"x1 ea 1 min each 1 min each 1 min each    1 min       Step ups       4" 10x each 4" 10x each 4" 15x each    4"  x15 ea                               Gait training I, 50 ft x3 3x50' 50 ft x3 3 min 4 min 5 min    NP                                                                                                                                                                                                       Modalities

## 2018-09-13 ENCOUNTER — APPOINTMENT (OUTPATIENT)
Dept: PHYSICAL THERAPY | Facility: CLINIC | Age: 68
End: 2018-09-13
Payer: MEDICARE

## 2018-09-17 ENCOUNTER — APPOINTMENT (OUTPATIENT)
Dept: PHYSICAL THERAPY | Facility: CLINIC | Age: 68
End: 2018-09-17
Payer: MEDICARE

## 2018-09-18 ENCOUNTER — OFFICE VISIT (OUTPATIENT)
Dept: PHYSICAL THERAPY | Facility: CLINIC | Age: 68
End: 2018-09-18
Payer: MEDICARE

## 2018-09-18 DIAGNOSIS — R29.898 WEAKNESS OF BOTH LEGS: ICD-10-CM

## 2018-09-18 DIAGNOSIS — R26.9 GAIT ABNORMALITY: Primary | ICD-10-CM

## 2018-09-18 PROCEDURE — 97110 THERAPEUTIC EXERCISES: CPT | Performed by: PHYSICAL THERAPY ASSISTANT

## 2018-09-18 PROCEDURE — 97112 NEUROMUSCULAR REEDUCATION: CPT | Performed by: PHYSICAL THERAPY ASSISTANT

## 2018-09-18 NOTE — PROGRESS NOTES
Daily Note     Today's date: 2018  Patient name: Oliver Oneil  : 1950  MRN: [id number]  Referring provider: Tere Oro DO  Dx:   Encounter Diagnosis     ICD-10-CM    1  Gait abnormality R26 9    2  Weakness of both legs R29 898                   Subjective: Pt reports, "Im doing OK "       Objective: See treatment diary below  Assessment: Had improved tolerance to program today, Seated rest breaks used during standing program  Minimal sway with balance on dynamic surface  Continue to progress as tolerated  Plan: Continue with plan of care and progress as able         Precautions: Charcot-Rashida-Tooth Disease, osteoporosis, anxiety     Daily Treatment Diary   Manuals  8/2  8/7  8/8  8/13  8/15  8/20  8/27  9/12  9/18                                                                                                     Exercise Diary                        Bike 5' 5' 3 min TM 3' @ 1 0 mph TM 4' @ 1 mph TM 5' @ 1 5 mph  [email or web address] mph  TM   2 min, 1 5 mph  TM  5 min  1 5 mph     Mini squats 20x 20x x20 2x10 3x10 3x10  3x10  3x10  3x10     Standing SLR extension 2x10 2x10 2x10 3x10 3x10 1 5# x20  1 5#,20x  2#  x20  2#  2x10     Standing SLR abduction 2x10 2x10 2x10 3x10 3x10 1 5# x20 1 5#x20  2#  x20  2#  2x10     Standing march 2x10 20x x20 3x10 3x10 1 5# x20  1 5#x20  2#  x20  2#  2x10     Seated HR 30x 30x x30 30x 30x 30x   NP       Seated hip abd/add Belt 3"x20 Belt 3"x20 belt 3"x20 Belt 3"x30 GTB 3"x30 GTB 3"x30  GTB  3'x30  AD - TB use 30x  Green 3"x30  green  3"x30     Seated march 3"x20 3"x20 3"x20 3"x30 1#x30 1 5# x30  1 5#x30  2#  x20  2#  2x10     LAQ 3"x20 20x3" 3"x20 3"x30 1#x30 1 5# x30  1 5#  x30  2#   x20  2#  2x10     Biodex LOS easy x2 (5')  Wt  Shift (3') LOS easy x2 (5')  Wt   Shift (3') LOS easy x2, weight shifting3 min LOS easy x3, weight shift, 3 min LOS easy x3, wt shift, 3 min NV  LOS  Easy  x3,wtshift,3min  deferred  NP     Rockerboard balance AP/ML NP np NP NP   NP    NV  1 min each     Standing balance on firm  NV np NP FT x1' FT 2x30"    FT  2x30"  NP  NP     Standing balance on foam NP np 30"x2 1 min x2 2 x 1 min 2 x 1 min    1 min   x2  FTEO  1' x 2     Tandem balance NP np 30"x1 ea 1 min each 1 min each 1 min each    1 min  1" x 2     Step ups       4" 10x each 4" 10x each 4" 15x each    4"  x15 ea  4"  x15 each                             Gait training I, 50 ft x3 3x50' 50 ft x3 3 min 4 min 5 min    NP                                                                                                                                                                                                       Modalities

## 2018-09-19 ENCOUNTER — TRANSCRIBE ORDERS (OUTPATIENT)
Dept: ADMINISTRATIVE | Facility: HOSPITAL | Age: 68
End: 2018-09-19

## 2018-09-19 ENCOUNTER — OFFICE VISIT (OUTPATIENT)
Dept: PHYSICAL THERAPY | Facility: CLINIC | Age: 68
End: 2018-09-19
Payer: MEDICARE

## 2018-09-19 DIAGNOSIS — R26.9 GAIT ABNORMALITY: Primary | ICD-10-CM

## 2018-09-19 DIAGNOSIS — R29.898 WEAKNESS OF BOTH LEGS: ICD-10-CM

## 2018-09-19 DIAGNOSIS — Z12.31 VISIT FOR SCREENING MAMMOGRAM: Primary | ICD-10-CM

## 2018-09-19 PROCEDURE — 97112 NEUROMUSCULAR REEDUCATION: CPT

## 2018-09-19 PROCEDURE — 97110 THERAPEUTIC EXERCISES: CPT

## 2018-09-19 NOTE — PROGRESS NOTES
Daily Note     Today's date: 2018  Patient name: Natasha Napier  : 1950  MRN: [id number]  Referring provider: Waynette Primrose, DO  Dx:   Encounter Diagnosis     ICD-10-CM    1  Gait abnormality R26 9    2  Weakness of both legs R29 898                   Subjective: Patient reported her asthma symptoms have improved, feeling better overall  Objective: See treatment diary below      Assessment: Progressing with LE strengthening, remaining challenged with dynamic balance activities, demonstrating LOB and increased sway in which she used occasional hand support to regain balance  Fatigued quickly on treadmill due to lack of endurance, reducing her time  Plan: Continue with plan of care and progress as able         Precautions: Charcot-Rashida-Tooth Disease, osteoporosis, anxiety     Daily Treatment Diary   Manuals  8/2  8/7  8/8  8/13  8/15  8/20  8/27  9/12  9/18  9/19                                                                                                   Exercise Diary                        Bike 5' 5' 3 min TM 3' @ 1 0 mph TM 4' @ 1 mph TM 5' @ 1 5 mph  [email or web address] mph  TM   2 min, 1 5 mph  TM  5 min  1 5 mph  TM  4 min, 1 5 mph   Mini squats 20x 20x x20 2x10 3x10 3x10  3x10  3x10  3x10  3x10   Standing SLR extension 2x10 2x10 2x10 3x10 3x10 1 5# x20  1 5#,20x  2#  x20  2#  2x10  2# 2x10   Standing SLR abduction 2x10 2x10 2x10 3x10 3x10 1 5# x20 1 5#x20  2#  x20  2#  2x10  2#  2x10   Standing march 2x10 20x x20 3x10 3x10 1 5# x20  1 5#x20  2#  x20  2#  2x10  2#  2x10   Seated HR 30x 30x x30 30x 30x 30x   NP    x30   Seated hip abd/add Belt 3"x20 Belt 3"x20 belt 3"x20 Belt 3"x30 GTB 3"x30 GTB 3"x30  GTB  3'x30  AD - TB use 30x  Green 3"x30  green  3"x30  Green 3"x30 ea   Seated march 3"x20 3"x20 3"x20 3"x30 1#x30 1 5# x30  1 5#x30  2#  x20  2#  2x10  2#  2x10   LAQ 3"x20 20x3" 3"x20 3"x30 1#x30 1 5# x30  1 5#  x30  2#   x20  2#  2x10  2#  2x10   Biodex LOS easy x2 (5')  Wt   Shift (3') LOS easy x2 (5')  Wt   Shift (3') LOS easy x2, weight shifting3 min LOS easy x3, weight shift, 3 min LOS easy x3, wt shift, 3 min NV  LOS  Easy  x3,wtshift,3min  deferred  NP  NP   Rockerboard balance AP/ML NP np NP NP   NP    NV  1 min each  1 min ea   Standing balance on firm  NV np NP FT x1' FT 2x30"    FT  2x30"  NP  NP  NP   Standing balance on foam NP np 30"x2 1 min x2 2 x 1 min 2 x 1 min    1 min   x2  FTEO  1' x 2  FTEO 1 min x2   Tandem balance NP np 30"x1 ea 1 min each 1 min each 1 min each    1 min  1" x 2  NP   Step ups       4" 10x each 4" 10x each 4" 15x each    4"  x15 ea  4"  x15 each  4"  x15 ea                           Gait training I, 50 ft x3 3x50' 50 ft x3 3 min 4 min 5 min    NP    NP                                                                                                                                                                                                   Modalities

## 2018-09-20 ENCOUNTER — APPOINTMENT (OUTPATIENT)
Dept: PHYSICAL THERAPY | Facility: CLINIC | Age: 68
End: 2018-09-20
Payer: MEDICARE

## 2018-09-21 ENCOUNTER — OFFICE VISIT (OUTPATIENT)
Dept: PHYSICAL THERAPY | Facility: CLINIC | Age: 68
End: 2018-09-21
Payer: MEDICARE

## 2018-09-21 DIAGNOSIS — R26.9 GAIT ABNORMALITY: Primary | ICD-10-CM

## 2018-09-21 DIAGNOSIS — R29.898 WEAKNESS OF BOTH LEGS: ICD-10-CM

## 2018-09-21 PROCEDURE — 97110 THERAPEUTIC EXERCISES: CPT

## 2018-09-21 PROCEDURE — 97112 NEUROMUSCULAR REEDUCATION: CPT

## 2018-09-21 NOTE — PROGRESS NOTES
Daily Note     Today's date: 2018  Patient name: Yola Brown  : 1950  MRN: [id number]  Referring provider: Lion August DO  Dx:   Encounter Diagnosis     ICD-10-CM    1  Gait abnormality R26 9    2  Weakness of both legs R29 898                   Subjective: Patient reports increased soreness in her legs today  Reports not wanting to warm up this visit or use weights  States she did too much work on her legs at home after therapy last session  Objective: See treatment diary below      Assessment: Patient able to progress through most TE however no weights added due to increased soreness  Improved balance noted by patient for mod tandem stance exercise  Fatigued after TE  Cont  To progress as alon  Plan: Continue per plan of care       Precautions: Charcot-Rashida-Tooth Disease, osteoporosis, anxiety     Daily Treatment Diary   Manuals  9/21  8/7  8/8  8/13  8/15  8/20  8/27  9/12  9/18  9/19                                                                                                   Exercise Diary                        Bike TM 1 mph 5' 5' 3 min TM 3' @ 1 0 mph TM 4' @ 1 mph TM 5' @ 1 5 mph  TM [email or web address] mph  TM   2 min, 1 5 mph  TM  5 min  1 5 mph  TM  4 min, 1 5 mph   Mini squats 20x 20x x20 2x10 3x10 3x10  3x10  3x10  3x10  3x10   Standing SLR extension 2x10 2x10 2x10 3x10 3x10 1 5# x20  1 5#,20x  2#  x20  2#  2x10  2# 2x10   Standing SLR abduction 2x10 2x10 2x10 3x10 3x10 1 5# x20 1 5#x20  2#  x20  2#  2x10  2#  2x10   Standing march 2x10 20x x20 3x10 3x10 1 5# x20  1 5#x20  2#  x20  2#  2x10  2#  2x10   Seated HR standing 30x 30x x30 30x 30x 30x   NP    x30   Seated hip abd/add 3" GTB /ball squeeze Belt 3"x20 belt 3"x20 Belt 3"x30 GTB 3"x30 GTB 3"x30  GTB  3'x30  AD - TB use 30x  Green 3"x30  green  3"x30  Green 3"x30 ea   Seated march 3"x20 3"x20 3"x20 3"x30 1#x30 1 5# x30  1 5#x30  2#  x20  2#  2x10  2#  2x10   LAQ 3"x20 20x3" 3"x20 3"x30 1#x30 1 5# x30  1 5#  x30  2#   x20  2#  2x10  2#  2x10   Biodex LOS static,medium x3 LOS easy x2 (5')  Wt   Shift (3') LOS easy x2, weight shifting3 min LOS easy x3, weight shift, 3 min LOS easy x3, wt shift, 3 min NV  LOS  Easy  x3,wtshift,3min  deferred  NP  NP   Rockerboard balance AP/ML 1 min ea np NP NP   NP    NV  1 min each  1 min ea   Standing balance on firm  np np NP FT x1' FT 2x30"    FT  2x30"  NP  NP  NP   Standing balance on foam FT/EO 1min x3 np 30"x2 1 min x2 2 x 1 min 2 x 1 min    1 min   x2  FTEO  1' x 2  FTEO 1 min x2   Tandem balance 1'x2 np 30"x1 ea 1 min each 1 min each 1 min each    1 min  1" x 2  NP   Step ups  4" x15     4" 10x each 4" 10x each 4" 15x each    4"  x15 ea  4"  x15 each  4"  x15 ea                           Gait training np 3x50' 50 ft x3 3 min 4 min 5 min    NP    NP                                                                                                                                                                                                   Modalities

## 2018-09-26 ENCOUNTER — OFFICE VISIT (OUTPATIENT)
Dept: PHYSICAL THERAPY | Facility: CLINIC | Age: 68
End: 2018-09-26
Payer: MEDICARE

## 2018-09-26 DIAGNOSIS — R29.898 WEAKNESS OF BOTH LEGS: ICD-10-CM

## 2018-09-26 DIAGNOSIS — R26.9 GAIT ABNORMALITY: Primary | ICD-10-CM

## 2018-09-26 PROCEDURE — 97110 THERAPEUTIC EXERCISES: CPT

## 2018-09-26 PROCEDURE — 97112 NEUROMUSCULAR REEDUCATION: CPT

## 2018-09-26 NOTE — PROGRESS NOTES
Daily Note     Today's date: 2018  Patient name: Mamie Dominguez  : 1950  MRN: [id number]  Referring provider: Gurinder Lucia DO  Dx:   Encounter Diagnosis     ICD-10-CM    1  Gait abnormality R26 9    2  Weakness of both legs R29 898                   Subjective: Patient reported her legs have been sore with treatment and activities performed around the house  Her asthma has been more controlled, causing her less chest discomfort  Objective: See treatment diary below      Assessment: Requested holding weights for LE strengthening exercises  Continues to demonstrate a challenge with balance activities, having increased sway with occasional use of hand support on dynamic surfaces  Was able to resume 5 min on treadmill with improved tolerance  Plan: Continue per plan of care  *Resume weights as able        Precautions: Charcot-Rashida-Tooth Disease, osteoporosis, anxiety     Daily Treatment Diary   Manuals                                                                     Exercise Diary              Treadmill 1 4 mph  5 min            Bike NP            Mini squats 3x10            Standing SLR extension 3x10            Standing SLR abduction 3x10            Standing march 3x10            Seated HR NP            Seated hip abd/add Green 3"x30 ea            Seated march x20            LAQ x20            Biodex NP            Rockerboard balance AP/ML 1 min ea            Standing balance on firm  NP            Standing balance on foam FTEO  1 min   x2            Tandem balance NP            Step ups 4"  x15                          Gait training NP                                                                                                                                                                                                            Modalities

## 2018-09-28 ENCOUNTER — OFFICE VISIT (OUTPATIENT)
Dept: PHYSICAL THERAPY | Facility: CLINIC | Age: 68
End: 2018-09-28
Payer: MEDICARE

## 2018-09-28 DIAGNOSIS — R29.898 WEAKNESS OF BOTH LEGS: ICD-10-CM

## 2018-09-28 DIAGNOSIS — R26.9 GAIT ABNORMALITY: Primary | ICD-10-CM

## 2018-09-28 PROCEDURE — 97112 NEUROMUSCULAR REEDUCATION: CPT

## 2018-09-28 PROCEDURE — 97110 THERAPEUTIC EXERCISES: CPT

## 2018-09-28 NOTE — PROGRESS NOTES
Daily Note     Today's date: 2018  Patient name: Marissa Wells  : 1950  MRN: [id number]  Referring provider: Chari Walker DO  Dx:   Encounter Diagnosis     ICD-10-CM    1  Gait abnormality R26 9    2  Weakness of both legs R29 898                   Subjective: Patient reports her asthma has been more controlled due to decreased heat and humidity  She still feels fatigued when completing HEP, however denies lasting muscle soreness  Objective: See treatment diary below  Added ankle weights back into LE strength program, instructing patient in proper technique  Assessment: Good tolerance to reintroduction of weights during LE strengthening, as patient just needed refresher on proper technique of exercises to reduce increased soreness  Patient with continued difficulty completing balance program, likely due to CMT in BLEs which decreases proprioceptive awareness  Plan: Continue per plan of care          Precautions: Charcot-Rashida-Tooth Disease, osteoporosis, anxiety     Daily Treatment Diary   Manuals                                                                     Exercise Diary              Treadmill 1 4 mph  5 min 1 5 mph 6 min                        Mini squats 3x10 2x15           Standing SLR extension 3x10 3x10           Standing SLR abduction 3x10 3x10           Standing march 3x10 3x0           Seated HR NP            Seated hip abd/add Green3"x30 ea GTB, ball 3"x30           Seated march x20 1# x30           LAQ x20 1# x30           Biodex NP LOS Easy 3x           Rockerboard balance AP/ML 1 min ea 1 min ea           Standing balance on foam FTEO  1 min   x2 FTEC 1 min x2           Tandem balance NP 30"x2 ea           Step ups 4"  x15 NV                                                                                                                                                                                                                                    Modalities

## 2018-10-03 ENCOUNTER — OFFICE VISIT (OUTPATIENT)
Dept: PHYSICAL THERAPY | Facility: CLINIC | Age: 68
End: 2018-10-03
Payer: MEDICARE

## 2018-10-03 DIAGNOSIS — R26.9 GAIT ABNORMALITY: Primary | ICD-10-CM

## 2018-10-03 DIAGNOSIS — R29.898 WEAKNESS OF BOTH LEGS: ICD-10-CM

## 2018-10-03 PROCEDURE — G8978 MOBILITY CURRENT STATUS: HCPCS

## 2018-10-03 PROCEDURE — 97110 THERAPEUTIC EXERCISES: CPT

## 2018-10-03 PROCEDURE — G8979 MOBILITY GOAL STATUS: HCPCS

## 2018-10-03 PROCEDURE — 97112 NEUROMUSCULAR REEDUCATION: CPT

## 2018-10-03 NOTE — PROGRESS NOTES
PT Re-Evaluation     Today's date: 10/3/2018  Patient name: Clyde Avalos  : 1950  MRN: [id number]  Referring provider: Felicia Maldonado DO  Dx:   Encounter Diagnosis     ICD-10-CM    1  Gait abnormality R26 9    2  Weakness of both legs R29 898                   Assessment  Impairments: abnormal gait, abnormal or restricted ROM, impaired balance, impaired physical strength, lacks appropriate home exercise program and safety issue  Functional limitations: difficulty completing household chores, unable to ascend/descend stairs, difficulty ambulating in the community  Assessment details: Clyde Avalos has been compliant with attending PT and HEP since initial eval  Brody Mckinney has made improvements in objective data since IE but is still limited compared to normal  Brody Mckinney continues with above listed impairments and would benefit from additional skilled PT to address these deficits to return to PLOF  Understanding of Dx/Px/POC: good   Prognosis: good    Goals  STG  1  Patient will demonstrate 3 second improved time on TUG test to facilitate improved safety in all ambulation  -Met  2  Patient will be independent in basic HEP 2-3 weeks  -Met  3  B/L hip and knee strength will improve 1/2 grade in 4 weeks  -Met    LTG  1  Patient will be independent in a comprehensive home exercise program   2  Patient will demonstrate improvement to WNL on mCTSIB test   3  B/L hip and knee strength will improve to at least 4/5 within 8 weeks  4  Patient will ascend/descend one flight of stairs independently safely within 8 weeks  5  Patient will perform all ADLs she was performing prior to injury within 8 weeks      Plan  Patient would benefit from: skilled physical therapy  Planned therapy interventions: abdominal trunk stabilization, balance/weight bearing training, functional ROM exercises, gait training, home exercise program, therapeutic exercise, therapeutic activities, transfer training, stretching, strengthening, patient education, neuromuscular re-education and manual therapy  Frequency: 2x week  Duration in weeks: 3  Treatment plan discussed with: patient        Subjective Evaluation    History of Present Illness  Date of onset: 7/5/2018  Mechanism of injury: Patient is 12 weeks s/p fall with resulting concussion (no LOC) on 7/5/18  She recently received new AFOs for assistance with ambulation to address 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, however states they do not fit properly and she has more irritation than assistance  Regarding her balance, patient reports she feels a significant improvement and feels more comfortable ambulating on even and uneven surfaces  Her biggest complaint is still weakness in LLE, particularly when ascending stairs, stating she does not feel comfortable doing this yet    Quality of life: good    Pain  No pain reported  Progression: improved    Treatments  Discharged from (in last 30 days): inpatient hospitalization and skilled nursing facility  Patient Goals  Patient goals for therapy: improved balance, increased motion, increased strength and independence with ADLs/IADLs  Patient goal: strength and stability of LEs, walk up and down steps        Objective     Neurological Testing     Additional Neurological Details  BLE intact to light touch    Strength/Myotome Testing     Left Hip   Planes of Motion   Flexion: 4+  Extension: 4  Abduction: 4+  Adduction: 4+    Isolated Muscles   Gluteus lauryn: 4  Gluteus medius: 4-    Right Hip   Planes of Motion   Flexion: 4+  Extension: 4  Abduction: 4+  Adduction: 4+    Isolated Muscles   Gluteus maximums: 4  Gluteus medius: 4    Left Knee   Flexion: 4  Extension: 4+  Quadriceps contraction: good    Right Knee   Flexion: 4+  Extension: 4+  Quadriceps contraction: good    Left Ankle/Foot   Dorsiflexion: 3  Plantar flexion: 4-    Right Ankle/Foot   Dorsiflexion: 3  Plantar flexion: 4-    Additional Strength Details  Limited DF strength B/L due to CMT  Patient wears B/L AFO to assist with ambulation    Ambulation     Comments   Ambulates FWB with RW for safety due to balance and strength deficits    General Comments     Hip Comments   TUG test: 10 seconds  mCTSIB  EO firm: 0 62  EC firm: 1 15  EO foam: 1 39  EC foam: DNC       Precautions: Charcot-Rashida-Tooth Disease, osteoporosis, anxiety     Daily Treatment Diary   Manuals  9/26  9/28  10/3                                                                  Exercise Diary              Treadmill 1 4 mph  5 min 1 5 mph 6 min 1 5 mph 6 min                       Mini squats 3x10 2x15 2x15          Standing SLR extension 3x10 3x10 2x15          Standing SLR abduction 3x10 3x10 2x15          Standing march 3x10 3x0 2x15          Seated HR NP            Seated hip abd/add Green3"x30 ea GTB, ball 3"x30 GTB, ball 3"x30          Seated march x20 1# x30 1#x30          LAQ x20 1# x30 1#x30          Biodex NP LOS Easy 3x mCTSIB testing          Rockerboard balance AP/ML 1 min ea 1 min ea NV          Standing balance on foam FTEO  1 min   x2 FTEC 1 min x2 FAEC 1 min x2          Tandem balance NP 30"x2 ea 30" x2 ea          Step ups 4"  x15 NV 4" x15                                                                                                                                                                                                                                     Modalities

## 2018-10-05 ENCOUNTER — OFFICE VISIT (OUTPATIENT)
Dept: PHYSICAL THERAPY | Facility: CLINIC | Age: 68
End: 2018-10-05
Payer: MEDICARE

## 2018-10-05 DIAGNOSIS — R26.9 GAIT ABNORMALITY: Primary | ICD-10-CM

## 2018-10-05 DIAGNOSIS — R29.898 WEAKNESS OF BOTH LEGS: ICD-10-CM

## 2018-10-05 PROCEDURE — 97110 THERAPEUTIC EXERCISES: CPT

## 2018-10-05 PROCEDURE — 97112 NEUROMUSCULAR REEDUCATION: CPT

## 2018-10-05 NOTE — PROGRESS NOTES
Daily Note     Today's date: 10/5/2018  Patient name: George Alcazar  : 1950  MRN: [id number]  Referring provider: Rudy Patel DO  Dx:   Encounter Diagnosis     ICD-10-CM    1  Gait abnormality R26 9    2  Weakness of both legs R29 898                   Subjective: Patient has no pain or issues to report upon arrival today  Objective: See treatment diary below  Assessment: Tolerated treatment well  Good performance of LE strengthening program and balance exercises, with no LOB or required guarding from PT throughout session  Patient demonstrated fatigue post treatment and exhibited good technique with therapeutic exercises      Plan: Continue therapy for one more week to finalize program and transition to HEP      Precautions: Charcot-Rashida-Tooth Disease, osteoporosis, anxiety     Daily Treatment Diary   Manuals  9/26  9/28  10/3  10/5                                                                 Exercise Diary              Treadmill 1 4 mph  5 min 1 5 mph 6 min 1 5 mph 6 min 1 8 mph 10 min                      Mini squats 3x10 2x15 2x15 2x15         Standing SLR extension 3x10 3x10 2x15 2x15         Standing SLR abduction 3x10 3x10 2x15 2x15         Standing march 3x10 3x0 2x15 2x15         Seated HR NP            Seated hip abd/add Green3"x30 ea GTB, ball 3"x30 GTB, ball 3"x30 GTB, ball 3"x30         Seated march x20 1# x30 1#x30 1 5#x30         LAQ x20 1# x30 1#x30 1 5#x30         Biodex NP LOS Easy 3x mCTSIB testing NV         Rockerboard balance AP/ML 1 min ea 1 min ea NV 1 min ea         Standing balance on foam FTEO  1 min   x2 FTEC 1 min x2 FAEC 1 min x2 FTEO w/ HT 30"x2         Tandem balance NP 30"x2 ea 30" x2 ea 30"x2 ea         Step ups 4"  x15 NV 4" x15 4"x15                                                                                                                                                                                                                                  Modalities

## 2018-10-10 ENCOUNTER — APPOINTMENT (OUTPATIENT)
Dept: PHYSICAL THERAPY | Facility: CLINIC | Age: 68
End: 2018-10-10
Payer: MEDICARE

## 2018-10-12 ENCOUNTER — OFFICE VISIT (OUTPATIENT)
Dept: PHYSICAL THERAPY | Facility: CLINIC | Age: 68
End: 2018-10-12
Payer: MEDICARE

## 2018-10-12 DIAGNOSIS — R29.898 WEAKNESS OF BOTH LEGS: ICD-10-CM

## 2018-10-12 DIAGNOSIS — R26.9 GAIT ABNORMALITY: Primary | ICD-10-CM

## 2018-10-12 PROCEDURE — 97112 NEUROMUSCULAR REEDUCATION: CPT

## 2018-10-12 PROCEDURE — 97110 THERAPEUTIC EXERCISES: CPT

## 2018-10-12 NOTE — PROGRESS NOTES
Daily Note     Today's date: 10/12/2018  Patient name: Marianela Solomon  : 1950  MRN: [id number]  Referring provider: Xenia Oviedo DO  Dx:   Encounter Diagnosis     ICD-10-CM    1  Gait abnormality R26 9    2  Weakness of both legs R29 898                   Subjective: Patient reports she has had no difficulty with HEP and feels comfortable that she is improving her strength and endurance  Objective: See treatment diary below  Strength/Myotome Testing      Left Hip   Planes of Motion   Flexion: 4+  Extension: 4  Abduction: 4+  Adduction: 4+     Isolated Muscles   Gluteus lauryn: 4  Gluteus medius: 4     Right Hip   Planes of Motion   Flexion: 4+  Extension: 4  Abduction: 4+  Adduction: 4+     Isolated Muscles   Gluteus maximums: 4  Gluteus medius: 4     Left Knee   Flexion: 4  Extension: 4+  Quadriceps contraction: good     Right Knee   Flexion: 4+  Extension: 4+  Quadriceps contraction: good     Left Ankle/Foot   Dorsiflexion: 3  Plantar flexion: 4    Right Ankle/Foot   Dorsiflexion: 3  Plantar flexion: 4    Additional Strength Details  Limited DF strength B/L due to CMT  Patient wears B/L AFO to assist with ambulation      Assessment: Tolerated treatment well  Continued to show improvement in strength and endurance of BLEs, still limited due to decreased cardio endurance  Patient wishes to discharge from PT and transition to fitness program with HEP  Goals  STG  1  Patient will demonstrate 3 second improved time on TUG test to facilitate improved safety in all ambulation  -Met  2  Patient will be independent in basic HEP 2-3 weeks  -Met  3  B/L hip and knee strength will improve 1/2 grade in 4 weeks  -Met    LTG  1  Patient will be independent in a comprehensive home exercise program  -Met  2  Patient will demonstrate improvement to WNL on mCTSIB test  -Partially met (limited due to BLE AFOs)  3  B/L hip and knee strength will improve to at least 4/5 within 8 weeks  -Met  4   Patient will ascend/descend one flight of stairs independently safely within 8 weeks  -Met  5  Patient will perform all ADLs she was performing prior to injury within 8 weeks   -Met      Plan: Discharge from therapy today and transition to fitness program  Patient will return next week for fitness evaluation and set up of independent strengthening program     Precautions: Charcot-Rashida-Tooth Disease, osteoporosis, anxiety     Daily Treatment Diary   Manuals  9/26  9/28  10/3  10/5  10/12                                                                Exercise Diary              Treadmill 1 4 mph  5 min 1 5 mph 6 min 1 5 mph 6 min 1 8 mph 10 min 1 8 mph 10 min                     Mini squats 3x10 2x15 2x15 2x15 2x15        Standing SLR extension 3x10 3x10 2x15 2x15 1# 2x15        Standing SLR abduction 3x10 3x10 2x15 2x15 1# 2x15        Standing march 3x10 3x0 2x15 2x15 1# 2x15        Seated HR NP            Seated hip abd/add Green3"x30 ea GTB, ball 3"x30 GTB, ball 3"x30 GTB, ball 3"x30 GTB, ball 3"x30        Seated march x20 1# x30 1#x30 1 5#x30 1 5# x30        LAQ x20 1# x30 1#x30 1 5#x30 1 5# x30        Biodex NP LOS Easy 3x mCTSIB testing NV LOS easy x3        Rockerboard balance AP/ML 1 min ea 1 min ea NV 1 min ea 1 min ea        Standing balance on foam FTEO  1 min   x2 FTEC 1 min x2 FAEC 1 min x2 FTEO w/ HT 30"x2 FTEO w/ HT 30"x3        Tandem balance NP 30"x2 ea 30" x2 ea 30"x2 ea 45"x2 ea        Step ups 4"  x15 NV 4" x15 4"x15 4"x20                                                                                                                                                                                                                                   Modalities

## 2018-10-19 DIAGNOSIS — J45.20 MILD INTERMITTENT ASTHMA WITHOUT COMPLICATION: ICD-10-CM

## 2018-10-25 ENCOUNTER — OFFICE VISIT (OUTPATIENT)
Dept: FAMILY MEDICINE CLINIC | Facility: CLINIC | Age: 68
End: 2018-10-25
Payer: MEDICARE

## 2018-10-25 ENCOUNTER — TELEPHONE (OUTPATIENT)
Dept: FAMILY MEDICINE CLINIC | Facility: CLINIC | Age: 68
End: 2018-10-25

## 2018-10-25 VITALS
OXYGEN SATURATION: 92 % | BODY MASS INDEX: 27.23 KG/M2 | RESPIRATION RATE: 18 BRPM | TEMPERATURE: 98.3 F | SYSTOLIC BLOOD PRESSURE: 140 MMHG | HEART RATE: 76 BPM | WEIGHT: 144.1 LBS | DIASTOLIC BLOOD PRESSURE: 94 MMHG

## 2018-10-25 DIAGNOSIS — J32.9 CHRONIC SINUSITIS, UNSPECIFIED LOCATION: ICD-10-CM

## 2018-10-25 DIAGNOSIS — J45.901 MODERATE ASTHMA WITH ACUTE EXACERBATION, UNSPECIFIED WHETHER PERSISTENT: Primary | ICD-10-CM

## 2018-10-25 PROCEDURE — 96372 THER/PROPH/DIAG INJ SC/IM: CPT | Performed by: FAMILY MEDICINE

## 2018-10-25 PROCEDURE — 99214 OFFICE O/P EST MOD 30 MIN: CPT | Performed by: FAMILY MEDICINE

## 2018-10-25 RX ORDER — BECLOMETHASONE DIPROPIONATE HFA 80 UG/1
2 AEROSOL, METERED RESPIRATORY (INHALATION) 2 TIMES DAILY
Qty: 1 INHALER | Refills: 5 | Status: SHIPPED | OUTPATIENT
Start: 2018-10-25 | End: 2019-01-01 | Stop reason: SDUPTHER

## 2018-10-25 RX ORDER — IPRATROPIUM BROMIDE 42 UG/1
2 SPRAY, METERED NASAL 3 TIMES DAILY
Qty: 15 ML | Refills: 0 | Status: SHIPPED | OUTPATIENT
Start: 2018-10-25 | End: 2019-01-01 | Stop reason: SDUPTHER

## 2018-10-25 RX ORDER — FLUTICASONE PROPIONATE 50 MCG
2 SPRAY, SUSPENSION (ML) NASAL DAILY
Qty: 16 G | Refills: 0 | Status: SHIPPED | OUTPATIENT
Start: 2018-10-25 | End: 2019-01-01 | Stop reason: SDUPTHER

## 2018-10-25 RX ORDER — AZELASTINE 1 MG/ML
1-2 SPRAY, METERED NASAL 2 TIMES DAILY
Qty: 30 ML | Refills: 0 | Status: ON HOLD | OUTPATIENT
Start: 2018-10-25 | End: 2019-01-01 | Stop reason: ALTCHOICE

## 2018-10-25 RX ORDER — DEXAMETHASONE SODIUM PHOSPHATE 4 MG/ML
6 INJECTION, SOLUTION INTRA-ARTICULAR; INTRALESIONAL; INTRAMUSCULAR; INTRAVENOUS; SOFT TISSUE ONCE
Status: COMPLETED | OUTPATIENT
Start: 2018-10-25 | End: 2018-10-25

## 2018-10-25 RX ORDER — CEFDINIR 300 MG/1
300 CAPSULE ORAL EVERY 12 HOURS SCHEDULED
Qty: 14 CAPSULE | Refills: 0 | Status: SHIPPED | OUTPATIENT
Start: 2018-10-25 | End: 2018-11-01

## 2018-10-25 RX ORDER — PREDNISONE 20 MG/1
40 TABLET ORAL DAILY
Qty: 10 TABLET | Refills: 0 | Status: SHIPPED | OUTPATIENT
Start: 2018-10-25 | End: 2018-10-30

## 2018-10-25 RX ADMIN — DEXAMETHASONE SODIUM PHOSPHATE 6 MG: 4 INJECTION, SOLUTION INTRA-ARTICULAR; INTRALESIONAL; INTRAMUSCULAR; INTRAVENOUS; SOFT TISSUE at 14:55

## 2018-10-25 NOTE — TELEPHONE ENCOUNTER
Pt called she is scheduled to see Gege Wright today at 2 pm when she was scheduling she mentioned something after 3 or 4 pm I called Michael Ramirez and let a message asking she call because as of this moment we have a 4:15 available with Mamie Larsen and she can try to see if it is still avilable and or otherwise we will see her today at 2 pm

## 2018-10-25 NOTE — PROGRESS NOTES
Assessment/Plan:   1  Moderate asthma with acute exacerbation, unspecified whether persistent/Chronic sinusitis, unspecified location  Symptoms today appear likely secondary to possible acute exacerbation  At this time, she was advised to continue with supportive care  Maintain hydration  Take over-the-counter Mucinex  Continue with her Atrovent as well as her fluticasone nasal spray  She may highly benefit from oral steroids  Will treat her with 40 mg of prednisone daily for 5 days straight  Start treatment as well with cefdinir 300 mg b i d   If any symptoms are persisting, she was advised to follow up immediately  - cefdinir (OMNICEF) 300 mg capsule; Take 1 capsule (300 mg total) by mouth every 12 (twelve) hours for 7 days  Dispense: 14 capsule; Refill: 0  - dexamethasone (DECADRON) injection 6 mg; Inject 1 5 mL (6 mg total) into a muscle once   - predniSONE 20 mg tablet; Take 2 tablets (40 mg total) by mouth daily for 5 days  Dispense: 10 tablet; Refill: 0       Diagnoses and all orders for this visit:    Chronic sinusitis, unspecified location  -     ipratropium (ATROVENT) 0 06 % nasal spray; 2 sprays into each nostril 3 (three) times a day    Other orders  -     azelastine (ASTELIN) 0 1 % nasal spray; 1-2 sprays into each nostril  -     fluticasone (FLONASE) 50 mcg/act nasal spray; 2 sprays into each nostril  -     QVAR REDIHALER 80 MCG/ACT inhaler; Inhale 2 puffs 2 (two) times a day          Subjective:    Chief Complaint   Patient presents with    chest congestion     for 3 days    Nasal Congestion        Patient ID: Maria Luisa Arroyo is a 70 y o  female  URI    This is a new problem  The current episode started in the past 7 days  The problem has been unchanged  There has been no fever  Associated symptoms include congestion, coughing, headaches, rhinorrhea, sinus pain and wheezing  Pertinent negatives include no abdominal pain, chest pain, diarrhea, dysuria, ear pain, nausea or sore throat  Review of Systems   Constitutional: Negative for activity change, chills, fatigue and fever  HENT: Positive for congestion, rhinorrhea and sinus pain  Negative for ear pain, sinus pressure and sore throat  Eyes: Negative for redness, itching and visual disturbance  Respiratory: Positive for cough and wheezing  Negative for shortness of breath  Cardiovascular: Negative for chest pain and palpitations  Gastrointestinal: Negative for abdominal pain, diarrhea and nausea  Endocrine: Negative for cold intolerance and heat intolerance  Genitourinary: Negative for dysuria, flank pain and frequency  Musculoskeletal: Negative for arthralgias, back pain, gait problem and myalgias  Skin: Negative for color change  Allergic/Immunologic: Negative for environmental allergies  Neurological: Positive for headaches  Negative for dizziness and numbness  Psychiatric/Behavioral: Negative for behavioral problems and sleep disturbance  The following portions of the patient's history were reviewed and updated as appropriate : past family history, past medical history, past social history and past surgical history        Current Outpatient Prescriptions:     ALPRAZolam (XANAX) 0 25 mg tablet, Take 0 25 mg by mouth 3 (three) times a day as needed for anxiety, Disp: , Rfl: 0    aspirin 81 MG tablet, Take 81 mg by mouth, Disp: , Rfl:     azelastine (ASTELIN) 0 1 % nasal spray, 1-2 sprays into each nostril, Disp: , Rfl:     carBAMazepine (TEGretol) 200 mg tablet, , Disp: , Rfl: 0    Cholecalciferol (VITAMIN D3) 3000 units TABS, Take by mouth, Disp: , Rfl:     Cholecalciferol 1000 units tablet, Take 1,000 Units by mouth, Disp: , Rfl:     EPINEPHrine (EPIPEN 2-ANALIA) 0 3 mg/0 3 mL SOAJ, Inject as directed, Disp: , Rfl:     Ferrous Sulfate (IRON) 325 (65 Fe) MG TABS, Take 1 tablet by mouth 2 (two) times a day, Disp: , Rfl:     fluticasone (FLONASE) 50 mcg/act nasal spray, 2 sprays into each nostril, Disp: , Rfl:     ipratropium (ATROVENT) 0 06 % nasal spray, 2 sprays into each nostril 3 (three) times a day, Disp: 15 mL, Rfl: 3    levothyroxine (SYNTHROID) 25 mcg tablet, Take 1 tablet by mouth daily, Disp: , Rfl:     montelukast (SINGULAIR) 10 mg tablet, take 1 tablet by mouth at bedtime, Disp: 30 tablet, Rfl: 5    olopatadine HCl (PATADAY) 0 2 % opth drops, Apply 1 drop to eye daily, Disp: , Rfl:     QVAR REDIHALER 80 MCG/ACT inhaler, Inhale 2 puffs 2 (two) times a day, Disp: , Rfl: 0    rosuvastatin (CRESTOR) 20 MG tablet, Take by mouth, Disp: , Rfl:     senna (SENOKOT) 8 6 MG tablet, Take 8 6 mg by mouth daily, Disp: , Rfl:     venlafaxine (EFFEXOR-XR) 75 mg 24 hr capsule, Take by mouth, Disp: , Rfl:     VENTOLIN  (90 Base) MCG/ACT inhaler, INHALE 2 PUFFS EVERY 4 HOURS AS NEEDED FOR WHEEZING , Disp: 18 g, Rfl: 1    dicyclomine (BENTYL) 10 mg capsule, Take 1 capsule by mouth 3 (three) times a day, Disp: , Rfl:     docusate sodium (COLACE) 100 mg capsule, Take 100 mg by mouth 2 (two) times a day, Disp: , Rfl:     erythromycin (ILOTYCIN) ophthalmic ointment, APPLY 1 APPLICATION ON THE SKIN 3 TIMES A DAY, Disp: , Rfl: 0    Objective:    Vitals:    10/25/18 1406   BP: 140/94   BP Location: Left arm   Patient Position: Sitting   Cuff Size: Large   Pulse: 76   Resp: 18   Temp: 98 3 °F (36 8 °C)   TempSrc: Tympanic   SpO2: 92%   Weight: 65 4 kg (144 lb 1 6 oz)        Physical Exam   Constitutional: She is oriented to person, place, and time  She appears well-developed and well-nourished  HENT:   Head: Normocephalic and atraumatic  Nose: Nose normal    Mouth/Throat: No oropharyngeal exudate  Eyes: Pupils are equal, round, and reactive to light  Right eye exhibits no discharge  Left eye exhibits no discharge  Neck: Normal range of motion  Neck supple  No tracheal deviation present  Cardiovascular: Normal rate, regular rhythm and intact distal pulses    Exam reveals no gallop and no friction rub     No murmur heard  Pulses:       Dorsalis pedis pulses are 2+ on the right side, and 2+ on the left side  Posterior tibial pulses are 2+ on the right side, and 2+ on the left side  Pulmonary/Chest: Effort normal and breath sounds normal  No respiratory distress  She has no wheezes  She has no rales  Poor expiratory effort   Abdominal: Soft  Bowel sounds are normal  She exhibits no distension  There is no tenderness  There is no rebound and no guarding  Musculoskeletal: Normal range of motion  She exhibits no edema  Lymphadenopathy:        Head (right side): No submental and no submandibular adenopathy present  Head (left side): No submental and no submandibular adenopathy present  She has no cervical adenopathy  Right cervical: No superficial cervical, no deep cervical and no posterior cervical adenopathy present  Left cervical: No superficial cervical, no deep cervical and no posterior cervical adenopathy present  Neurological: She is alert and oriented to person, place, and time  No cranial nerve deficit or sensory deficit  Skin: Skin is warm, dry and intact  Psychiatric: Her speech is normal and behavior is normal  Judgment normal  Her mood appears not anxious  Cognition and memory are normal  She does not exhibit a depressed mood  Vitals reviewed

## 2018-11-05 ENCOUNTER — IMMUNIZATION (OUTPATIENT)
Dept: FAMILY MEDICINE CLINIC | Facility: CLINIC | Age: 68
End: 2018-11-05
Payer: MEDICARE

## 2018-11-05 DIAGNOSIS — Z23 NEED FOR INFLUENZA VACCINATION: Primary | ICD-10-CM

## 2018-11-05 PROCEDURE — G0008 ADMIN INFLUENZA VIRUS VAC: HCPCS | Performed by: FAMILY MEDICINE

## 2018-11-05 PROCEDURE — 90662 IIV NO PRSV INCREASED AG IM: CPT | Performed by: FAMILY MEDICINE

## 2018-11-10 ENCOUNTER — OFFICE VISIT (OUTPATIENT)
Dept: FAMILY MEDICINE CLINIC | Facility: CLINIC | Age: 68
End: 2018-11-10
Payer: MEDICARE

## 2018-11-10 VITALS
TEMPERATURE: 97.2 F | RESPIRATION RATE: 16 BRPM | WEIGHT: 143.13 LBS | HEIGHT: 61 IN | OXYGEN SATURATION: 98 % | BODY MASS INDEX: 27.02 KG/M2 | DIASTOLIC BLOOD PRESSURE: 80 MMHG | HEART RATE: 96 BPM | SYSTOLIC BLOOD PRESSURE: 136 MMHG

## 2018-11-10 DIAGNOSIS — K58.9 IRRITABLE BOWEL SYNDROME, UNSPECIFIED TYPE: ICD-10-CM

## 2018-11-10 DIAGNOSIS — R35.0 URINARY FREQUENCY: Primary | ICD-10-CM

## 2018-11-10 DIAGNOSIS — B35.6 TINEA CRURIS: ICD-10-CM

## 2018-11-10 LAB
SL AMB  POCT GLUCOSE, UA: ABNORMAL
SL AMB LEUKOCYTE ESTERASE,UA: ABNORMAL
SL AMB POCT BILIRUBIN,UA: ABNORMAL
SL AMB POCT BLOOD,UA: ABNORMAL
SL AMB POCT CLARITY,UA: CLEAR
SL AMB POCT COLOR,UA: YELLOW
SL AMB POCT KETONES,UA: NEGATIVE
SL AMB POCT NITRITE,UA: NEGATIVE
SL AMB POCT PH,UA: 5.5
SL AMB POCT SPECIFIC GRAVITY,UA: >=1.3
SL AMB POCT URINE PROTEIN: ABNORMAL
SL AMB POCT UROBILINOGEN: 0.2

## 2018-11-10 PROCEDURE — 99214 OFFICE O/P EST MOD 30 MIN: CPT | Performed by: PHYSICIAN ASSISTANT

## 2018-11-10 PROCEDURE — 87086 URINE CULTURE/COLONY COUNT: CPT | Performed by: PHYSICIAN ASSISTANT

## 2018-11-10 PROCEDURE — 81003 URINALYSIS AUTO W/O SCOPE: CPT | Performed by: PHYSICIAN ASSISTANT

## 2018-11-10 RX ORDER — CIPROFLOXACIN 500 MG/1
500 TABLET, FILM COATED ORAL EVERY 12 HOURS SCHEDULED
Qty: 10 TABLET | Refills: 0 | Status: SHIPPED | OUTPATIENT
Start: 2018-11-10 | End: 2018-11-15

## 2018-11-10 RX ORDER — NYSTATIN 100000 U/G
CREAM TOPICAL 2 TIMES DAILY
Qty: 30 G | Refills: 1 | Status: SHIPPED | OUTPATIENT
Start: 2018-11-10 | End: 2019-01-01

## 2018-11-10 NOTE — PATIENT INSTRUCTIONS
You can take bentyl for irritable bowel if stomach and intestines are upset  Complete 5 days course of ciprofloxacin antibiotic for possible urinary infection    Use nystatin cream to rash under abdominal skin fold twice a day for fungal rash

## 2018-11-10 NOTE — PROGRESS NOTES
Assessment/Plan:      Diagnoses and all orders for this visit:    Urinary frequency  -     POCT urine dip auto non-scope  -     ciprofloxacin (CIPRO) 500 mg tablet; Take 1 tablet (500 mg total) by mouth every 12 (twelve) hours for 5 days  -     Urine culture    Irritable bowel syndrome, unspecified type    Tinea cruris  -     nystatin (MYCOSTATIN) cream; Apply topically 2 (two) times a day        Patient is a 78-year-old female presenting today for multiple symptoms  She does report some lower abdominal discomfort as well as low back discomfort as well as some pain in her left lower quadrant and left upper quadrant  She does note more urinary frequency  Urine dip showing trace glucose, bilirubin, blood, protein and leukocytes  I will initiate treatment with ciprofloxacin b i d  x5 days  She should increase her water intake  She is a prediabetic with last A1c a few months ago at 6 3  She has been noting drinking soda every day for the past week or 2 so I advised she discontinue this, especially since she has glucose in her urine  We will try to send out urine for culture though she was  Unable to provide a suitable specimen  Depending how she feels I may have her repeat a urine  Sample  Regarding her other GI symptoms  She said that it worsened after eating Teriyaki sauce  She does have a history of IBS but has not been taking any medications for this  I will have her start her Bentyl and follow a brat diet and we will see how she responds  She has no significant guarding or rebound to consider acute abdomen and no changes to her bowels either  She also has a rash in her lower left abdominal fold consistent with tinea  She should use topical nystatin twice a day and keep the area dry otherwise  We will call her in follow-up from the culture result  She should certainly call sooner or follow-up in the office with any concerns or worsening symptoms      Chief Complaint   Patient presents with   Negro Pert Abdominal and L side back pain     x couple days  Pt has red rash around abdominal area  Has washed with soapy warm water, and has applied Josiah powder       Subjective:     Patient ID: Megan Dubon is a 70 y o  female  67Y/O FEMALE HERE TODAY FOR multiple sxs  Stomach and back pain x a few days  Also a rash left lower abdominal fold  She has some gas and left abd and left flank pain  She doesn't have diarrhea  She doesn't typically have BM's every day  No blood in stool  No fevers or chills  She is urinating more frequently  Review of Systems   Constitutional: Negative  Respiratory: Negative  Cardiovascular: Negative  Gastrointestinal:        As in HPI   Genitourinary:        As in HPI   Neurological: Negative  Psychiatric/Behavioral: Negative  The following portions of the patient's history were reviewed and updated as appropriate: allergies, current medications, past family history, past medical history, past social history, past surgical history and problem list       Objective:     Physical Exam   Constitutional: She is oriented to person, place, and time  She appears well-developed and well-nourished  Neck: Neck supple  Cardiovascular: Normal rate, regular rhythm and normal heart sounds  Pulmonary/Chest: Effort normal and breath sounds normal    Abdominal: Soft  Bowel sounds are normal  She exhibits no distension  There is tenderness (LLQ/LUQ tenderness  no CVA tenderness)  Neurological: She is alert and oriented to person, place, and time  Vitals reviewed        Vitals:    11/10/18 1201   BP: 136/80   BP Location: Left arm   Patient Position: Sitting   Cuff Size: Large   Pulse: 96   Resp: 16   Temp: (!) 97 2 °F (36 2 °C)   TempSrc: Tympanic   SpO2: 98%   Weight: 64 9 kg (143 lb 2 oz)   Height: 5' 1 02" (1 55 m)

## 2018-11-11 LAB — BACTERIA UR CULT: NORMAL

## 2018-11-17 DIAGNOSIS — J45.20 MILD INTERMITTENT ASTHMA WITHOUT COMPLICATION: ICD-10-CM

## 2018-11-19 ENCOUNTER — TELEPHONE (OUTPATIENT)
Dept: FAMILY MEDICINE CLINIC | Facility: CLINIC | Age: 68
End: 2018-11-19

## 2018-11-19 NOTE — TELEPHONE ENCOUNTER
FYI  Pt called to let Kaci Monge know her side is feeling better  Pt stated she does have an appointment on 11/26 with Dr Kolton Romo and will hold off on doing a CT scan until after that appointment  Pt stated she will discuss what is next with him when she comes in

## 2018-11-26 ENCOUNTER — OFFICE VISIT (OUTPATIENT)
Dept: FAMILY MEDICINE CLINIC | Facility: CLINIC | Age: 68
End: 2018-11-26
Payer: MEDICARE

## 2018-11-26 VITALS
OXYGEN SATURATION: 95 % | TEMPERATURE: 97.3 F | WEIGHT: 140.4 LBS | HEART RATE: 79 BPM | RESPIRATION RATE: 14 BRPM | SYSTOLIC BLOOD PRESSURE: 136 MMHG | DIASTOLIC BLOOD PRESSURE: 80 MMHG | HEIGHT: 61 IN | BODY MASS INDEX: 26.51 KG/M2

## 2018-11-26 DIAGNOSIS — E78.2 MIXED HYPERLIPIDEMIA: ICD-10-CM

## 2018-11-26 DIAGNOSIS — Z00.00 MEDICARE ANNUAL WELLNESS VISIT, SUBSEQUENT: Primary | ICD-10-CM

## 2018-11-26 DIAGNOSIS — Z23 NEED FOR VACCINATION: ICD-10-CM

## 2018-11-26 DIAGNOSIS — H10.13 ALLERGIC CONJUNCTIVITIS OF BOTH EYES: ICD-10-CM

## 2018-11-26 DIAGNOSIS — J45.40 MODERATE PERSISTENT ASTHMA WITHOUT COMPLICATION: ICD-10-CM

## 2018-11-26 DIAGNOSIS — J32.9 CHRONIC SINUSITIS, UNSPECIFIED LOCATION: ICD-10-CM

## 2018-11-26 DIAGNOSIS — E03.9 HYPOTHYROIDISM, UNSPECIFIED TYPE: ICD-10-CM

## 2018-11-26 PROCEDURE — 99214 OFFICE O/P EST MOD 30 MIN: CPT | Performed by: FAMILY MEDICINE

## 2018-11-26 PROCEDURE — G0009 ADMIN PNEUMOCOCCAL VACCINE: HCPCS | Performed by: FAMILY MEDICINE

## 2018-11-26 PROCEDURE — 90732 PPSV23 VACC 2 YRS+ SUBQ/IM: CPT | Performed by: FAMILY MEDICINE

## 2018-11-26 PROCEDURE — G0439 PPPS, SUBSEQ VISIT: HCPCS | Performed by: FAMILY MEDICINE

## 2018-11-26 RX ORDER — OLOPATADINE HYDROCHLORIDE 2 MG/ML
1 SOLUTION/ DROPS OPHTHALMIC DAILY
Qty: 1 DROP | Refills: 0 | Status: SHIPPED | OUTPATIENT
Start: 2018-11-26 | End: 2019-01-01 | Stop reason: HOSPADM

## 2018-11-26 NOTE — PROGRESS NOTES
Assessment/Plan:      Asthma stable  Continue with maintenance regimen, Singulair with Proventil for rescue  Hyperlipidemia stable  Continue receive a statin  Check prior to next labs  Hypothyroidism stable on current dosage of levothyroxine  Urinary symptoms resolved  No need for additional studies at this time but cautioned patient that should she have flank pain or other urinary symptoms to return to the office at which time she may need a CT scan  Diagnoses and all orders for this visit:    Medicare annual wellness visit, subsequent  Comments:   questionnaire reviewed  Due for Pneumovax  Other screenings either up-to-date or scheduled  Power of  in place    Need for vaccination  -     PNEUMOCOCCAL POLYSACCHARIDE VACCINE 23-VALENT =>3YO SQ IM    Allergic conjunctivitis of both eyes  -     olopatadine HCl (PATADAY) 0 2 % opth drops; Administer 1 drop to both eyes daily    Mixed hyperlipidemia    Moderate persistent asthma without complication    Chronic sinusitis, unspecified location    Hypothyroidism, unspecified type          Subjective:      Patient ID: Valeria Tucker is a 70 y o  female  Patient presents for routine follow-up chronic medical problems  Chronic issues include hypothyroidism, asthma, chronic musculoskeletal complaints from muscular dystrophy and Charcot Rashida disease  She also has arthritis  She has hyperlipidemia and chronic sinusitis  Chronic medications include Xanax for anxiety, Astelin nasal spray and Pataday eyedrops for allergic rhinitis and conjunctivitis  She takes Synthroid for hypothyroidism  She takes Singulair  And albuterol inhaler for asthma  She takes venlafaxine for anxiety and depression  She also takes rosuvastatin for hyperlipidemia  She was recently seen for both exacerbations of her asthma and for urinary symptoms with flank  Pain  Her asthma symptoms stabilized and returned to baseline    Her urinary symptoms also improved  She no longer has her flank pain and her urine culture proved negative  She did complete her course of antibiotics however  The following portions of the patient's history were reviewed and updated as appropriate: allergies, current medications, past family history, past medical history, past social history, past surgical history and problem list     Review of Systems   HENT: Positive for postnasal drip  Eyes: Positive for itching  Respiratory: Negative  Cardiovascular: Negative  Gastrointestinal: Negative  Genitourinary: Negative  Musculoskeletal: Positive for arthralgias and gait problem  Neurological: Positive for weakness  Psychiatric/Behavioral: Negative  Objective:      /80 (BP Location: Left arm, Patient Position: Sitting, Cuff Size: Standard)   Pulse 79   Temp (!) 97 3 °F (36 3 °C) (Tympanic)   Resp 14   Ht 5' 1 02" (1 55 m)   Wt 63 7 kg (140 lb 6 4 oz)   LMP  (LMP Unknown)   SpO2 95%   BMI 26 51 kg/m²          Physical Exam   Constitutional: She is oriented to person, place, and time  She appears well-developed and well-nourished  No distress  HENT:   Head: Normocephalic and atraumatic  Eyes: Pupils are equal, round, and reactive to light  Conjunctivae are normal  Right eye exhibits no discharge  Neck: Normal range of motion  No thyromegaly present  Cardiovascular: Normal rate and regular rhythm  Pulmonary/Chest: Effort normal and breath sounds normal  No respiratory distress  Lymphadenopathy:     She has no cervical adenopathy  Neurological: She is alert and oriented to person, place, and time  Skin: Skin is warm and dry  She is not diaphoretic  Psychiatric: She has a normal mood and affect  Her behavior is normal  Judgment and thought content normal    Nursing note and vitals reviewed

## 2018-11-26 NOTE — PROGRESS NOTES
Assessment and Plan:  Problem List Items Addressed This Visit     None      Visit Diagnoses     Medicare annual wellness visit, subsequent    -  Primary     questionnaire reviewed  Due for Pneumovax  Other screenings either up-to-date or scheduled    Power of  in place    Need for vaccination        Relevant Orders    PNEUMOCOCCAL POLYSACCHARIDE VACCINE 23-VALENT =>3YO SQ IM    Allergic conjunctivitis of both eyes        Relevant Medications    olopatadine HCl (PATADAY) 0 2 % opth drops        Health Maintenance Due   Topic Date Due    Hepatitis C Screening  1950    Urinary Incontinence Screening  02/06/2014    Pneumococcal PPSV23/PCV13 65+ Years / Low and Medium Risk (2 of 2 - PPSV23) 10/15/2017         HPI:  Patient Active Problem List   Diagnosis    Anemia    Asthma    Chronic sinusitis    Depression    Esophagitis, reflux    Hypothyroidism    Irritable bowel syndrome    Lower abdominal pain    Mixed hyperlipidemia    Neuralgia    Osteoporosis    Postmenopausal osteoporosis    Prediabetes    Thyroid nodule    Vitamin D deficiency    Charcot-Rashida disease    Concussion    Dizziness    Headache    Moderate persistent asthma without complication    Muscular dystrophy     Past Medical History:   Diagnosis Date    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    Chronic allergic rhinitis 10/13/2015    Chronic sinusitis 12/14/2015    Thyroid nodule 10/16/2015     Past Surgical History:   Procedure Laterality Date    HYSTERECTOMY      NECK SURGERY      WRIST SURGERY       Family History   Problem Relation Age of Onset    Heart disease Mother     Lung disease Mother     Heart disease Father     COPD Sister     Cancer Sister     Asthma Family     Heart disease Family     Diabetes Family     Hypertension Family     Mental illness Family      History   Smoking Status    Never Smoker   Smokeless Tobacco    Never Used     History   Alcohol Use No      History   Drug Use No Current Outpatient Prescriptions   Medication Sig Dispense Refill    ALPRAZolam (XANAX) 0 25 mg tablet Take 0 25 mg by mouth 3 (three) times a day as needed for anxiety  0    aspirin 81 MG tablet Take 81 mg by mouth daily        azelastine (ASTELIN) 0 1 % nasal spray 1-2 sprays into each nostril 2 (two) times a day 30 mL 0    carBAMazepine (TEGretol) 200 mg tablet Take 200 mg by mouth daily    0    Cholecalciferol (VITAMIN D3) 3000 units TABS Take by mouth daily        Cholecalciferol 1000 units tablet Take 1,000 Units by mouth      dicyclomine (BENTYL) 10 mg capsule Take 1 capsule by mouth 3 (three) times a day      docusate sodium (COLACE) 100 mg capsule Take 100 mg by mouth 2 (two) times a day      EPINEPHrine (EPIPEN 2-ANALIA) 0 3 mg/0 3 mL SOAJ Inject as directed      erythromycin (ILOTYCIN) ophthalmic ointment APPLY 1 APPLICATION ON THE SKIN 3 TIMES A DAY/PRN  0    Ferrous Sulfate (IRON) 325 (65 Fe) MG TABS Take 1 tablet by mouth 2 (two) times a day      fluticasone (FLONASE) 50 mcg/act nasal spray 2 sprays into each nostril daily 16 g 0    ipratropium (ATROVENT) 0 06 % nasal spray 2 sprays into each nostril 3 (three) times a day 15 mL 0    levothyroxine (SYNTHROID) 25 mcg tablet Take 1 tablet by mouth daily      montelukast (SINGULAIR) 10 mg tablet take 1 tablet by mouth at bedtime 30 tablet 5    nystatin (MYCOSTATIN) cream Apply topically 2 (two) times a day 30 g 1    olopatadine HCl (PATADAY) 0 2 % opth drops Administer 1 drop to both eyes daily 1 drop 0    QVAR REDIHALER 80 MCG/ACT inhaler Inhale 2 puffs 2 (two) times a day 1 Inhaler 5    rosuvastatin (CRESTOR) 20 MG tablet Take by mouth      senna (SENOKOT) 8 6 MG tablet Take 8 6 mg by mouth daily      venlafaxine (EFFEXOR-XR) 75 mg 24 hr capsule Take 75 mg by mouth daily        VENTOLIN  (90 Base) MCG/ACT inhaler inhale 2 puffs by mouth every 4 hours if needed for wheezing 18 g 1     No current facility-administered medications for this visit  No Known Allergies  Immunization History   Administered Date(s) Administered     Influenza (IM) Preservative Free 08/27/2013    Influenza 11/01/2011, 10/15/2012, 08/27/2013, 11/12/2013, 01/08/2014, 08/05/2014, 09/26/2015, 08/22/2016, 10/27/2017    Influenza Split High Dose Preservative Free IM 09/26/2015, 08/22/2016, 08/24/2017    Influenza TIV (IM) 10/15/2012    Influenza, high dose seasonal 0 5 mL 11/05/2018    Pneumococcal Conjugate 13-Valent 08/12/2015    Pneumococcal Polysaccharide PPV23 10/15/2012    Td (adult), adsorbed 07/03/2014    Zoster 06/07/2015       Patient Care Team:  Kishor Andrea DO as PCP - General  Teto Lamb MD    Medicare Screening Tests and Risk Assessments:  Brandin Whelan is here for her Subsequent Wellness visit  Last Medicare Wellness visit information reviewed, patient interviewed, no change since last AWV  Health Risk Assessment:  Patient rates overall health as good  Patient feels that their physical health rating is Same  Eyesight was rated as Slightly worse  Hearing was rated as Same  Patient feels that their emotional and mental health rating is Same  Pain experienced by patient in the last 7 days has been Some  Patient's pain rating has been 2/10  Patient states that she has experienced no weight loss or gain in last 6 months  Emotional/Mental Health:  Patient has been feeling nervous/anxious  PHQ-9 Depression Screening:    Frequency of the following problems over the past two weeks:      1  Little interest or pleasure in doing things: 0 - not at all      2  Feeling down, depressed, or hopeless: 0 - not at all  PHQ-2 Score: 0          Broken Bones/Falls: Fall Risk Assessment:    In the past year, patient has experienced: No history of falling in past year          Bladder/Bowel:  Patient has leaked urine accidently in the last six months  Patient reports no loss of bowel control      Immunizations:  Patient has had a flu vaccination within the last year  Patient has received a pneumonia shot  Patient has received a shingles shot  Patient has received tetanus/diphtheria shot  Home Safety:  Patient does not have trouble with stairs inside or outside of their home  Patient currently reports that there are safety hazards present in home , working smoke alarms, working carbon monoxide detectors  Preventative Screenings:   Breast cancer screening performed, colon cancer screen completed, cholesterol screen completed, glaucoma eye exam completed,     Nutrition:  Current diet: Low Cholesterol and Limited junk food with servings of the following:    Medications:  Patient is currently taking over-the-counter supplements  List of OTC medications includes: Iron and Calcium  Patient is able to manage medications  Lifestyle Choices:  Patient reports no tobacco use  Patient has not smoked or used tobacco in the past   Patient reports no alcohol use  Patient drives a vehicle  Patient wears seat belt  Activities of Daily Living:  Can get out of bed by his or her self, able to dress self, able to make own meals, able to do own shopping, able to bathe self, can do own laundry/housekeeping, can manage own money, pay bills and track expenses    Previous Hospitalizations:  Hospitalization or ED visit in past 12 months  Number of hospitalizations within the last year: 1-2        Advanced Directives:  Patient has decided on a power of   Patient has spoken to designated power of   Patient has not completed advanced directive          Preventative Screening/Counseling:      Cardiovascular:      General: Screening Current          Diabetes:      General: Screening Current          Colorectal Cancer:      General: Screening Current          Breast Cancer:      General: Screening Current          Cervical Cancer:      General: Screening Not Indicated          Osteoporosis:      General: Screening Current AAA:      General: Screening Not Indicated          Glaucoma:      General: Screening Current          HIV:      General: Screening Not Indicated          Hepatitis C:      Counseling: has received general HCV counseling        Advanced Directives:   has durable POA for healthcare, patient does not have an advanced directive  Information on ACP and/or AD not provided  No end of life assessment reviewed with patient  Immunizations:      Influenza: Influenza UTD This Year      Pneumococcal: Pneumococcal Due Today      Shingrix: Vaccine Status Unknown      Hepatitis B (Low risk patients): Series Not Indicated      Zostavax: Zostavax Vaccine UTD      TD: Td Vaccine UTD            No exam data present    Physical Exam:  Review of Systems   Gastrointestinal: Negative for bowel incontinence  Psychiatric/Behavioral: The patient is not nervous/anxious  Vitals:    11/26/18 1103   BP: 136/80   BP Location: Left arm   Patient Position: Sitting   Cuff Size: Standard   Pulse: 79   Resp: 14   Temp: (!) 97 3 °F (36 3 °C)   TempSrc: Tympanic   SpO2: 95%   Weight: 63 7 kg (140 lb 6 4 oz)   Height: 5' 1 02" (1 55 m)   Body mass index is 26 51 kg/m²      Physical Exam

## 2018-11-28 ENCOUNTER — TELEPHONE (OUTPATIENT)
Dept: FAMILY MEDICINE CLINIC | Facility: CLINIC | Age: 68
End: 2018-11-28

## 2018-11-28 NOTE — TELEPHONE ENCOUNTER
Patient called the office, stating that the rx for eye drops are $160  Patient states she is unable to pay that amount and would like to see if there is anyway we could call her insurance company to possibly approve the medication

## 2019-01-01 ENCOUNTER — TELEPHONE (OUTPATIENT)
Dept: FAMILY MEDICINE CLINIC | Facility: CLINIC | Age: 69
End: 2019-01-01

## 2019-01-01 ENCOUNTER — OFFICE VISIT (OUTPATIENT)
Dept: FAMILY MEDICINE CLINIC | Facility: CLINIC | Age: 69
End: 2019-01-01
Payer: MEDICARE

## 2019-01-01 ENCOUNTER — OFFICE VISIT (OUTPATIENT)
Dept: PHYSICAL THERAPY | Facility: CLINIC | Age: 69
End: 2019-01-01
Payer: MEDICARE

## 2019-01-01 ENCOUNTER — HOSPITAL ENCOUNTER (OUTPATIENT)
Facility: HOSPITAL | Age: 69
Discharge: HOME | End: 2019-02-15
Attending: ORTHOPAEDIC SURGERY | Admitting: ORTHOPAEDIC SURGERY
Payer: MEDICARE

## 2019-01-01 ENCOUNTER — DOCUMENTATION (OUTPATIENT)
Dept: FAMILY MEDICINE CLINIC | Facility: CLINIC | Age: 69
End: 2019-01-01

## 2019-01-01 ENCOUNTER — OFFICE VISIT (OUTPATIENT)
Dept: URGENT CARE | Facility: MEDICAL CENTER | Age: 69
End: 2019-01-01
Payer: MEDICARE

## 2019-01-01 ENCOUNTER — APPOINTMENT (OUTPATIENT)
Dept: RADIOLOGY | Facility: HOSPITAL | Age: 69
End: 2019-01-01
Payer: MEDICARE

## 2019-01-01 ENCOUNTER — OFFICE VISIT (OUTPATIENT)
Dept: OBGYN CLINIC | Facility: MEDICAL CENTER | Age: 69
End: 2019-01-01

## 2019-01-01 ENCOUNTER — APPOINTMENT (EMERGENCY)
Dept: RADIOLOGY | Facility: HOSPITAL | Age: 69
DRG: 208 | End: 2019-01-01
Payer: MEDICARE

## 2019-01-01 ENCOUNTER — APPOINTMENT (OUTPATIENT)
Dept: LAB | Facility: CLINIC | Age: 69
End: 2019-01-01
Payer: MEDICARE

## 2019-01-01 ENCOUNTER — APPOINTMENT (OUTPATIENT)
Dept: PHYSICAL THERAPY | Facility: CLINIC | Age: 69
End: 2019-01-01
Payer: MEDICARE

## 2019-01-01 ENCOUNTER — APPOINTMENT (OUTPATIENT)
Dept: RADIOLOGY | Facility: CLINIC | Age: 69
End: 2019-01-01
Payer: MEDICARE

## 2019-01-01 ENCOUNTER — ANESTHESIA EVENT (OUTPATIENT)
Dept: PERIOP | Facility: HOSPITAL | Age: 69
End: 2019-01-01
Payer: MEDICARE

## 2019-01-01 ENCOUNTER — TRANSCRIBE ORDERS (OUTPATIENT)
Dept: ADMINISTRATIVE | Facility: HOSPITAL | Age: 69
End: 2019-01-01

## 2019-01-01 ENCOUNTER — APPOINTMENT (OUTPATIENT)
Dept: RADIOLOGY | Facility: MEDICAL CENTER | Age: 69
End: 2019-01-01
Payer: MEDICARE

## 2019-01-01 ENCOUNTER — EVALUATION (OUTPATIENT)
Dept: PHYSICAL THERAPY | Facility: CLINIC | Age: 69
End: 2019-01-01
Payer: MEDICARE

## 2019-01-01 ENCOUNTER — APPOINTMENT (EMERGENCY)
Dept: CT IMAGING | Facility: HOSPITAL | Age: 69
DRG: 208 | End: 2019-01-01
Payer: MEDICARE

## 2019-01-01 ENCOUNTER — HOSPITAL ENCOUNTER (OUTPATIENT)
Dept: CT IMAGING | Facility: HOSPITAL | Age: 69
Discharge: HOME | End: 2019-01-11
Attending: OTOLARYNGOLOGY
Payer: MEDICARE

## 2019-01-01 ENCOUNTER — OFFICE VISIT (OUTPATIENT)
Dept: OBGYN CLINIC | Facility: MEDICAL CENTER | Age: 69
End: 2019-01-01
Payer: MEDICARE

## 2019-01-01 ENCOUNTER — HOSPITAL ENCOUNTER (OUTPATIENT)
Dept: ULTRASOUND IMAGING | Facility: MEDICAL CENTER | Age: 69
Discharge: HOME | End: 2019-06-12
Payer: MEDICARE

## 2019-01-01 ENCOUNTER — HOSPITAL ENCOUNTER (INPATIENT)
Facility: HOSPITAL | Age: 69
LOS: 2 days | DRG: 208 | End: 2019-11-30
Attending: EMERGENCY MEDICINE | Admitting: INTERNAL MEDICINE
Payer: MEDICARE

## 2019-01-01 ENCOUNTER — ANESTHESIA (OUTPATIENT)
Dept: PERIOP | Facility: HOSPITAL | Age: 69
End: 2019-01-01
Payer: MEDICARE

## 2019-01-01 ENCOUNTER — TELEPHONE (OUTPATIENT)
Dept: OBGYN CLINIC | Facility: HOSPITAL | Age: 69
End: 2019-01-01

## 2019-01-01 ENCOUNTER — APPOINTMENT (OUTPATIENT)
Dept: PREADMISSION TESTING | Facility: HOSPITAL | Age: 69
End: 2019-01-01
Payer: MEDICARE

## 2019-01-01 ENCOUNTER — HOSPITAL ENCOUNTER (OUTPATIENT)
Dept: RADIOLOGY | Facility: HOSPITAL | Age: 69
Setting detail: OUTPATIENT SURGERY
Discharge: HOME | End: 2019-02-14
Payer: MEDICARE

## 2019-01-01 ENCOUNTER — APPOINTMENT (OUTPATIENT)
Dept: LAB | Facility: HOSPITAL | Age: 69
End: 2019-01-01
Attending: ORTHOPAEDIC SURGERY
Payer: MEDICARE

## 2019-01-01 ENCOUNTER — HOSPITAL ENCOUNTER (OUTPATIENT)
Dept: NON INVASIVE DIAGNOSTICS | Facility: HOSPITAL | Age: 69
Discharge: HOME | End: 2019-02-08
Attending: ORTHOPAEDIC SURGERY
Payer: MEDICARE

## 2019-01-01 VITALS
DIASTOLIC BLOOD PRESSURE: 79 MMHG | HEIGHT: 63 IN | SYSTOLIC BLOOD PRESSURE: 162 MMHG | HEART RATE: 91 BPM | BODY MASS INDEX: 25.41 KG/M2 | WEIGHT: 143.4 LBS

## 2019-01-01 VITALS
BODY MASS INDEX: 24.91 KG/M2 | WEIGHT: 140.6 LBS | HEIGHT: 63 IN | SYSTOLIC BLOOD PRESSURE: 146 MMHG | HEART RATE: 93 BPM | DIASTOLIC BLOOD PRESSURE: 82 MMHG

## 2019-01-01 VITALS
TEMPERATURE: 97.6 F | BODY MASS INDEX: 25.95 KG/M2 | DIASTOLIC BLOOD PRESSURE: 77 MMHG | RESPIRATION RATE: 18 BRPM | HEIGHT: 62 IN | SYSTOLIC BLOOD PRESSURE: 162 MMHG | WEIGHT: 141 LBS | HEART RATE: 82 BPM | OXYGEN SATURATION: 88 %

## 2019-01-01 VITALS
HEART RATE: 92 BPM | RESPIRATION RATE: 18 BRPM | OXYGEN SATURATION: 93 % | DIASTOLIC BLOOD PRESSURE: 73 MMHG | SYSTOLIC BLOOD PRESSURE: 164 MMHG | WEIGHT: 138 LBS | HEIGHT: 63 IN | BODY MASS INDEX: 24.45 KG/M2 | TEMPERATURE: 98.9 F

## 2019-01-01 VITALS
WEIGHT: 143 LBS | HEIGHT: 63 IN | HEART RATE: 92 BPM | DIASTOLIC BLOOD PRESSURE: 79 MMHG | BODY MASS INDEX: 25.34 KG/M2 | SYSTOLIC BLOOD PRESSURE: 161 MMHG

## 2019-01-01 VITALS
TEMPERATURE: 97.7 F | OXYGEN SATURATION: 91 % | SYSTOLIC BLOOD PRESSURE: 160 MMHG | HEART RATE: 86 BPM | BODY MASS INDEX: 26.31 KG/M2 | WEIGHT: 143 LBS | DIASTOLIC BLOOD PRESSURE: 80 MMHG | HEIGHT: 62 IN

## 2019-01-01 VITALS
OXYGEN SATURATION: 90 % | DIASTOLIC BLOOD PRESSURE: 70 MMHG | WEIGHT: 143.6 LBS | TEMPERATURE: 98.3 F | HEIGHT: 63 IN | BODY MASS INDEX: 25.45 KG/M2 | SYSTOLIC BLOOD PRESSURE: 150 MMHG | HEART RATE: 95 BPM

## 2019-01-01 VITALS
BODY MASS INDEX: 24.8 KG/M2 | WEIGHT: 140 LBS | SYSTOLIC BLOOD PRESSURE: 156 MMHG | HEIGHT: 63 IN | DIASTOLIC BLOOD PRESSURE: 82 MMHG | HEART RATE: 100 BPM

## 2019-01-01 VITALS
SYSTOLIC BLOOD PRESSURE: 168 MMHG | HEART RATE: 80 BPM | WEIGHT: 141.4 LBS | BODY MASS INDEX: 26.02 KG/M2 | HEIGHT: 62 IN | DIASTOLIC BLOOD PRESSURE: 98 MMHG

## 2019-01-01 VITALS
TEMPERATURE: 98.6 F | HEART RATE: 94 BPM | RESPIRATION RATE: 12 BRPM | SYSTOLIC BLOOD PRESSURE: 112 MMHG | BODY MASS INDEX: 25.95 KG/M2 | DIASTOLIC BLOOD PRESSURE: 65 MMHG | OXYGEN SATURATION: 96 % | WEIGHT: 144.18 LBS

## 2019-01-01 VITALS
WEIGHT: 140 LBS | SYSTOLIC BLOOD PRESSURE: 151 MMHG | HEART RATE: 90 BPM | DIASTOLIC BLOOD PRESSURE: 88 MMHG | HEIGHT: 63 IN | BODY MASS INDEX: 24.8 KG/M2

## 2019-01-01 DIAGNOSIS — J45.20 MILD INTERMITTENT ASTHMA WITHOUT COMPLICATION: ICD-10-CM

## 2019-01-01 DIAGNOSIS — S52.502S CLOSED FRACTURE OF DISTAL END OF LEFT RADIUS, UNSPECIFIED FRACTURE MORPHOLOGY, SEQUELA: ICD-10-CM

## 2019-01-01 DIAGNOSIS — Z87.81 S/P ORIF (OPEN REDUCTION INTERNAL FIXATION) FRACTURE: Primary | ICD-10-CM

## 2019-01-01 DIAGNOSIS — J96.90 RESPIRATORY FAILURE (HCC): ICD-10-CM

## 2019-01-01 DIAGNOSIS — B37.9 CANDIDIASIS: ICD-10-CM

## 2019-01-01 DIAGNOSIS — Z98.890 S/P ORIF (OPEN REDUCTION INTERNAL FIXATION) FRACTURE: Primary | ICD-10-CM

## 2019-01-01 DIAGNOSIS — Z11.59 ENCOUNTER FOR HEPATITIS C SCREENING TEST FOR LOW RISK PATIENT: ICD-10-CM

## 2019-01-01 DIAGNOSIS — R73.03 PREDIABETES: ICD-10-CM

## 2019-01-01 DIAGNOSIS — R30.0 DYSURIA: Primary | ICD-10-CM

## 2019-01-01 DIAGNOSIS — Z87.81 S/P ORIF (OPEN REDUCTION INTERNAL FIXATION) FRACTURE: ICD-10-CM

## 2019-01-01 DIAGNOSIS — S52.502A CLOSED FRACTURE DISTAL RADIUS AND ULNA, LEFT, INITIAL ENCOUNTER: ICD-10-CM

## 2019-01-01 DIAGNOSIS — J34.89 OVERDEVELOPMENT OF NASAL BONES: ICD-10-CM

## 2019-01-01 DIAGNOSIS — S52.602A CLOSED FRACTURE DISTAL RADIUS AND ULNA, LEFT, INITIAL ENCOUNTER: ICD-10-CM

## 2019-01-01 DIAGNOSIS — S52.602A CLOSED FRACTURE DISTAL RADIUS AND ULNA, LEFT, INITIAL ENCOUNTER: Primary | ICD-10-CM

## 2019-01-01 DIAGNOSIS — E78.2 MIXED HYPERLIPIDEMIA: ICD-10-CM

## 2019-01-01 DIAGNOSIS — I62.9 INTRACRANIAL HEMORRHAGE (HCC): ICD-10-CM

## 2019-01-01 DIAGNOSIS — S52.502A CLOSED FRACTURE DISTAL RADIUS AND ULNA, LEFT, INITIAL ENCOUNTER: Primary | ICD-10-CM

## 2019-01-01 DIAGNOSIS — J45.40 MODERATE PERSISTENT ASTHMA, UNSPECIFIED WHETHER COMPLICATED: ICD-10-CM

## 2019-01-01 DIAGNOSIS — E04.1 THYROID NODULE: ICD-10-CM

## 2019-01-01 DIAGNOSIS — E03.9 HYPOTHYROIDISM, UNSPECIFIED TYPE: ICD-10-CM

## 2019-01-01 DIAGNOSIS — D50.9 IRON DEFICIENCY ANEMIA, UNSPECIFIED IRON DEFICIENCY ANEMIA TYPE: ICD-10-CM

## 2019-01-01 DIAGNOSIS — J32.9 CHRONIC SINUSITIS, UNSPECIFIED LOCATION: ICD-10-CM

## 2019-01-01 DIAGNOSIS — K58.1 IRRITABLE BOWEL SYNDROME WITH CONSTIPATION: ICD-10-CM

## 2019-01-01 DIAGNOSIS — E03.9 HYPOTHYROIDISM, UNSPECIFIED TYPE: Primary | ICD-10-CM

## 2019-01-01 DIAGNOSIS — S52.602A CLOSED FRACTURE OF DISTAL ENDS OF LEFT RADIUS AND ULNA, INITIAL ENCOUNTER: Primary | ICD-10-CM

## 2019-01-01 DIAGNOSIS — Z01.818 PREOP EXAMINATION: ICD-10-CM

## 2019-01-01 DIAGNOSIS — M65.342 TRIGGER FINGER, LEFT RING FINGER: Primary | ICD-10-CM

## 2019-01-01 DIAGNOSIS — Z98.890 S/P ORIF (OPEN REDUCTION INTERNAL FIXATION) FRACTURE: ICD-10-CM

## 2019-01-01 DIAGNOSIS — E04.1 THYROID NODULE: Primary | ICD-10-CM

## 2019-01-01 DIAGNOSIS — D50.9 IRON DEFICIENCY ANEMIA, UNSPECIFIED IRON DEFICIENCY ANEMIA TYPE: Primary | ICD-10-CM

## 2019-01-01 DIAGNOSIS — S52.502A CLOSED FRACTURE OF DISTAL ENDS OF LEFT RADIUS AND ULNA, INITIAL ENCOUNTER: Primary | ICD-10-CM

## 2019-01-01 DIAGNOSIS — M65.4 TENOSYNOVITIS, DE QUERVAIN: ICD-10-CM
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
## 2019-01-01 DIAGNOSIS — F32.A DEPRESSION, UNSPECIFIED DEPRESSION TYPE: ICD-10-CM

## 2019-01-01 DIAGNOSIS — R41.82 ALTERED MENTAL STATUS: Primary | ICD-10-CM

## 2019-01-01 DIAGNOSIS — R73.09 ELEVATED GLUCOSE: ICD-10-CM

## 2019-01-01 DIAGNOSIS — R40.0 SOMNOLENCE: ICD-10-CM

## 2019-01-01 DIAGNOSIS — M65.4 DE QUERVAIN'S TENOSYNOVITIS, LEFT: ICD-10-CM

## 2019-01-01 DIAGNOSIS — J32.9 CHRONIC SINUSITIS, UNSPECIFIED LOCATION: Primary | ICD-10-CM

## 2019-01-01 DIAGNOSIS — M65.342 TRIGGER RING FINGER OF LEFT HAND: ICD-10-CM

## 2019-01-01 DIAGNOSIS — S52.502S CLOSED FRACTURE OF DISTAL END OF LEFT RADIUS, UNSPECIFIED FRACTURE MORPHOLOGY, SEQUELA: Primary | ICD-10-CM

## 2019-01-01 LAB
ALBUMIN SERPL BCP-MCNC: 3.6 G/DL (ref 3.5–5)
ALBUMIN SERPL BCP-MCNC: 3.7 G/DL (ref 3.5–5)
ALP SERPL-CCNC: 115 U/L (ref 46–116)
ALP SERPL-CCNC: 130 U/L (ref 46–116)
ALT SERPL W P-5'-P-CCNC: 19 U/L (ref 12–78)
ALT SERPL W P-5'-P-CCNC: 22 U/L (ref 12–78)
AMPHETAMINES SERPL QL SCN: NEGATIVE
ANION GAP SERPL CALCULATED.3IONS-SCNC: 7 MMOL/L (ref 4–13)
ANION GAP SERPL CALCULATED.3IONS-SCNC: 7 MMOL/L (ref 4–13)
ANION GAP SERPL CALCULATED.3IONS-SCNC: 9 MMOL/L (ref 4–13)
ANION GAP SERPL CALCULATED.3IONS-SCNC: 9 MMOL/L (ref 4–13)
APAP SERPL-MCNC: <2 UG/ML (ref 10–20)
APTT PPP: 23 SECONDS (ref 23–37)
ARTERIAL PATENCY WRIST A: YES
ARTERIAL PATENCY WRIST A: YES
AST SERPL W P-5'-P-CCNC: 16 U/L (ref 5–45)
AST SERPL W P-5'-P-CCNC: 23 U/L (ref 5–45)
ATRIAL RATE: 111 BPM
ATRIAL RATE: 125 BPM
ATRIAL RATE: 90 BPM
BARBITURATES UR QL: NEGATIVE
BASE EXCESS BLDA CALC-SCNC: 0.3 MMOL/L
BASE EXCESS BLDA CALC-SCNC: 1.3 MMOL/L
BASE EXCESS BLDA CALC-SCNC: 2.4 MMOL/L
BASE EXCESS BLDA CALC-SCNC: 5 MMOL/L (ref -2–3)
BASOPHILS # BLD AUTO: 0.03 THOUSANDS/ΜL (ref 0–0.1)
BASOPHILS # BLD AUTO: 0.04 THOUSANDS/ΜL (ref 0–0.1)
BASOPHILS # BLD AUTO: 0.04 THOUSANDS/ΜL (ref 0–0.1)
BASOPHILS NFR BLD AUTO: 0 % (ref 0–1)
BASOPHILS NFR BLD AUTO: 1 % (ref 0–1)
BASOPHILS NFR BLD AUTO: 1 % (ref 0–1)
BENZODIAZ UR QL: POSITIVE
BILIRUB SERPL-MCNC: 0.21 MG/DL (ref 0.2–1)
BILIRUB SERPL-MCNC: 0.24 MG/DL (ref 0.2–1)
BUN SERPL-MCNC: 12 MG/DL (ref 5–25)
BUN SERPL-MCNC: 13 MG/DL (ref 5–25)
BUN SERPL-MCNC: 14 MG/DL (ref 5–25)
BUN SERPL-MCNC: 20 MG/DL (ref 5–25)
CA-I BLD-SCNC: 1.03 MMOL/L (ref 1.12–1.32)
CALCIUM SERPL-MCNC: 8.2 MG/DL (ref 8.3–10.1)
CALCIUM SERPL-MCNC: 8.8 MG/DL (ref 8.3–10.1)
CALCIUM SERPL-MCNC: 8.8 MG/DL (ref 8.3–10.1)
CALCIUM SERPL-MCNC: 9.4 MG/DL (ref 8.3–10.1)
CHLORIDE SERPL-SCNC: 101 MMOL/L (ref 100–108)
CHLORIDE SERPL-SCNC: 101 MMOL/L (ref 100–108)
CHLORIDE SERPL-SCNC: 108 MMOL/L (ref 100–108)
CHLORIDE SERPL-SCNC: 99 MMOL/L (ref 100–108)
CHOLEST SERPL-MCNC: 192 MG/DL (ref 50–200)
CO2 SERPL-SCNC: 26 MMOL/L (ref 21–32)
CO2 SERPL-SCNC: 32 MMOL/L (ref 21–32)
CO2 SERPL-SCNC: 35 MMOL/L (ref 21–32)
CO2 SERPL-SCNC: 35 MMOL/L (ref 21–32)
COCAINE UR QL: NEGATIVE
CREAT SERPL-MCNC: 0.53 MG/DL (ref 0.6–1.3)
CREAT SERPL-MCNC: 0.53 MG/DL (ref 0.6–1.3)
CREAT SERPL-MCNC: 0.65 MG/DL (ref 0.6–1.3)
CREAT SERPL-MCNC: 0.76 MG/DL (ref 0.6–1.3)
DS:DELIVERY SYSTEM: 45
EOSINOPHIL # BLD AUTO: 0 THOUSAND/ΜL (ref 0–0.61)
EOSINOPHIL # BLD AUTO: 0.07 THOUSAND/ΜL (ref 0–0.61)
EOSINOPHIL # BLD AUTO: 0.08 THOUSAND/ΜL (ref 0–0.61)
EOSINOPHIL NFR BLD AUTO: 0 % (ref 0–6)
EOSINOPHIL NFR BLD AUTO: 1 % (ref 0–6)
EOSINOPHIL NFR BLD AUTO: 1 % (ref 0–6)
ERYTHROCYTE [DISTWIDTH] IN BLOOD BY AUTOMATED COUNT: 12.4 % (ref 11.6–15.1)
ERYTHROCYTE [DISTWIDTH] IN BLOOD BY AUTOMATED COUNT: 12.7 % (ref 11.6–15.1)
ERYTHROCYTE [DISTWIDTH] IN BLOOD BY AUTOMATED COUNT: 13.2 % (ref 11.6–15.1)
ERYTHROCYTE [DISTWIDTH] IN BLOOD BY AUTOMATED COUNT: 13.2 % (ref 11.6–15.1)
EST. AVERAGE GLUCOSE BLD GHB EST-MCNC: 131 MG/DL
ETHANOL SERPL-MCNC: <3 MG/DL (ref 0–3)
FIO2 GAS DIL.REBREATH: 50 L
GFR SERPL CREATININE-BSD FRML MDRD: 80 ML/MIN/1.73SQ M
GFR SERPL CREATININE-BSD FRML MDRD: 90 ML/MIN/1.73SQ M
GFR SERPL CREATININE-BSD FRML MDRD: 97 ML/MIN/1.73SQ M
GFR SERPL CREATININE-BSD FRML MDRD: 97 ML/MIN/1.73SQ M
GLUCOSE P FAST SERPL-MCNC: 116 MG/DL (ref 65–99)
GLUCOSE SERPL-MCNC: 129 MG/DL (ref 65–140)
GLUCOSE SERPL-MCNC: 133 MG/DL (ref 65–140)
GLUCOSE SERPL-MCNC: 135 MG/DL (ref 65–140)
GLUCOSE SERPL-MCNC: 142 MG/DL (ref 65–140)
GLUCOSE SERPL-MCNC: 145 MG/DL (ref 65–140)
GLUCOSE SERPL-MCNC: 235 MG/DL (ref 65–140)
GLUCOSE SERPL-MCNC: 247 MG/DL (ref 65–140)
GLUCOSE SERPL-MCNC: 81 MG/DL (ref 65–140)
HBA1C MFR BLD: 6.2 % (ref 4.2–6.3)
HCO3 BLDA-SCNC: 21.2 MMOL/L (ref 22–28)
HCO3 BLDA-SCNC: 24.1 MMOL/L (ref 22–28)
HCO3 BLDA-SCNC: 24.8 MMOL/L (ref 22–28)
HCO3 BLDA-SCNC: 24.8 MMOL/L (ref 22–28)
HCT VFR BLD AUTO: 34.9 % (ref 34.8–46.1)
HCT VFR BLD AUTO: 40.6 % (ref 34.8–46.1)
HCT VFR BLD AUTO: 40.8 % (ref 34.8–46.1)
HCT VFR BLD AUTO: 42.5 % (ref 34.8–46.1)
HCT VFR BLD CALC: 37 % (ref 34.8–46.1)
HDLC SERPL-MCNC: 66 MG/DL (ref 40–60)
HGB BLD-MCNC: 11.4 G/DL (ref 11.5–15.4)
HGB BLD-MCNC: 12.3 G/DL (ref 11.5–15.4)
HGB BLD-MCNC: 12.5 G/DL (ref 11.5–15.4)
HGB BLD-MCNC: 13.1 G/DL (ref 11.5–15.4)
HGB BLDA-MCNC: 12.6 G/DL (ref 11.5–15.4)
HOROWITZ INDEX BLDA+IHG-RTO: 100 MM[HG]
HOROWITZ INDEX BLDA+IHG-RTO: 40 MM[HG]
HOROWITZ INDEX BLDA+IHG-RTO: 50 MM[HG]
IMM GRANULOCYTES # BLD AUTO: 0.01 THOUSAND/UL (ref 0–0.2)
IMM GRANULOCYTES # BLD AUTO: 0.02 THOUSAND/UL (ref 0–0.2)
IMM GRANULOCYTES # BLD AUTO: 0.1 THOUSAND/UL (ref 0–0.2)
IMM GRANULOCYTES NFR BLD AUTO: 0 % (ref 0–2)
IMM GRANULOCYTES NFR BLD AUTO: 0 % (ref 0–2)
IMM GRANULOCYTES NFR BLD AUTO: 1 % (ref 0–2)
INR PPP: 1.08 (ref 0.84–1.19)
LACTATE SERPL-SCNC: 5 MMOL/L (ref 0.5–2)
LACTATE SERPL-SCNC: 5.1 MMOL/L (ref 0.5–2)
LACTATE SERPL-SCNC: 5.7 MMOL/L (ref 0.5–2)
LDLC SERPL CALC-MCNC: 101 MG/DL (ref 0–100)
LIPASE SERPL-CCNC: 86 U/L (ref 73–393)
LYMPHOCYTES # BLD AUTO: 1.49 THOUSANDS/ΜL (ref 0.6–4.47)
LYMPHOCYTES # BLD AUTO: 2.42 THOUSANDS/ΜL (ref 0.6–4.47)
LYMPHOCYTES # BLD AUTO: 2.75 THOUSANDS/ΜL (ref 0.6–4.47)
LYMPHOCYTES NFR BLD AUTO: 10 % (ref 14–44)
LYMPHOCYTES NFR BLD AUTO: 36 % (ref 14–44)
LYMPHOCYTES NFR BLD AUTO: 37 % (ref 14–44)
MAGNESIUM SERPL-MCNC: 1.2 MG/DL (ref 1.6–2.6)
MCH RBC QN AUTO: 28.3 PG (ref 26.8–34.3)
MCH RBC QN AUTO: 28.7 PG (ref 26.8–34.3)
MCH RBC QN AUTO: 29.1 PG (ref 26.8–34.3)
MCH RBC QN AUTO: 29.5 PG (ref 26.8–34.3)
MCHC RBC AUTO-ENTMCNC: 30.1 G/DL (ref 31.4–37.4)
MCHC RBC AUTO-ENTMCNC: 30.8 G/DL (ref 31.4–37.4)
MCHC RBC AUTO-ENTMCNC: 30.8 G/DL (ref 31.4–37.4)
MCHC RBC AUTO-ENTMCNC: 32.7 G/DL (ref 31.4–37.4)
MCV RBC AUTO: 90 FL (ref 82–98)
MCV RBC AUTO: 93 FL (ref 82–98)
MCV RBC AUTO: 94 FL (ref 82–98)
MCV RBC AUTO: 94 FL (ref 82–98)
METHADONE UR QL: NEGATIVE
MONOCYTES # BLD AUTO: 0.56 THOUSAND/ΜL (ref 0.17–1.22)
MONOCYTES # BLD AUTO: 0.56 THOUSAND/ΜL (ref 0.17–1.22)
MONOCYTES # BLD AUTO: 0.91 THOUSAND/ΜL (ref 0.17–1.22)
MONOCYTES NFR BLD AUTO: 6 % (ref 4–12)
MONOCYTES NFR BLD AUTO: 8 % (ref 4–12)
MONOCYTES NFR BLD AUTO: 8 % (ref 4–12)
NEUTROPHILS # BLD AUTO: 13.16 THOUSANDS/ΜL (ref 1.85–7.62)
NEUTROPHILS # BLD AUTO: 3.63 THOUSANDS/ΜL (ref 1.85–7.62)
NEUTROPHILS # BLD AUTO: 4.07 THOUSANDS/ΜL (ref 1.85–7.62)
NEUTS SEG NFR BLD AUTO: 53 % (ref 43–75)
NEUTS SEG NFR BLD AUTO: 54 % (ref 43–75)
NEUTS SEG NFR BLD AUTO: 83 % (ref 43–75)
NRBC BLD AUTO-RTO: 0 /100 WBCS
NT-PROBNP SERPL-MCNC: 82 PG/ML
O2 CT BLDA-SCNC: 16.5 ML/DL (ref 16–23)
O2 CT BLDA-SCNC: 16.9 ML/DL (ref 16–23)
O2 CT BLDA-SCNC: 18.5 ML/DL (ref 16–23)
OPIATES UR QL SCN: NEGATIVE
OXYHGB MFR BLDA: 97.6 % (ref 94–97)
OXYHGB MFR BLDA: 97.6 % (ref 94–97)
OXYHGB MFR BLDA: 98.5 % (ref 94–97)
P AXIS: 58 DEGREES
P AXIS: 79 DEGREES
P AXIS: 80 DEGREES
PCO2 BLD: 22.2 MM HG (ref 36–44)
PCO2 BLD: 26 MMOL/L (ref 21–32)
PCO2 BLDA: 21.7 MM HG (ref 36–44)
PCO2 BLDA: 28.8 MM HG (ref 36–44)
PCO2 BLDA: 39.9 MM HG (ref 36–44)
PCP UR QL: NEGATIVE
PEEP RESPIRATORY: 5 CM[H2O]
PH BLD: 7.66 [PH] (ref 7.35–7.45)
PH BLDA: 7.41 [PH] (ref 7.35–7.45)
PH BLDA: 7.54 [PH] (ref 7.35–7.45)
PH BLDA: 7.61 [PH] (ref 7.35–7.45)
PHOSPHATE SERPL-MCNC: 3.7 MG/DL (ref 2.3–4.1)
PLATELET # BLD AUTO: 201 THOUSANDS/UL (ref 149–390)
PLATELET # BLD AUTO: 220 THOUSANDS/UL (ref 149–390)
PLATELET # BLD AUTO: 263 THOUSANDS/UL (ref 149–390)
PLATELET # BLD AUTO: 291 THOUSANDS/UL (ref 149–390)
PMV BLD AUTO: 10.2 FL (ref 8.9–12.7)
PMV BLD AUTO: 10.3 FL (ref 8.9–12.7)
PMV BLD AUTO: 10.4 FL (ref 8.9–12.7)
PMV BLD AUTO: 9.5 FL (ref 8.9–12.7)
PO2 BLD: 121 MM HG (ref 75–129)
PO2 BLDA: 106.8 MM HG (ref 75–129)
PO2 BLDA: 113.1 MM HG (ref 75–129)
PO2 BLDA: 282.7 MM HG (ref 75–129)
POTASSIUM BLD-SCNC: 3.4 MMOL/L (ref 3.5–5.3)
POTASSIUM SERPL-SCNC: 3.1 MMOL/L (ref 3.5–5.3)
POTASSIUM SERPL-SCNC: 3.6 MMOL/L (ref 3.5–5.3)
POTASSIUM SERPL-SCNC: 3.7 MMOL/L (ref 3.5–5.3)
POTASSIUM SERPL-SCNC: 3.7 MMOL/L (ref 3.5–5.3)
PR INTERVAL: 125 MS
PR INTERVAL: 144 MS
PR INTERVAL: 154 MS
PROT SERPL-MCNC: 7.4 G/DL (ref 6.4–8.2)
PROT SERPL-MCNC: 7.5 G/DL (ref 6.4–8.2)
PROTHROMBIN TIME: 14.1 SECONDS (ref 11.6–14.5)
QRS AXIS: 115 DEGREES
QRS AXIS: 58 DEGREES
QRS AXIS: 67 DEGREES
QRSD INTERVAL: 74 MS
QRSD INTERVAL: 75 MS
QRSD INTERVAL: 78 MS
QT INTERVAL: 346 MS
QT INTERVAL: 364 MS
QT INTERVAL: 372 MS
QTC INTERVAL: 445 MS
QTC INTERVAL: 499 MS
QTC INTERVAL: 505 MS
RBC # BLD AUTO: 3.87 MILLION/UL (ref 3.81–5.12)
RBC # BLD AUTO: 4.35 MILLION/UL (ref 3.81–5.12)
RBC # BLD AUTO: 4.36 MILLION/UL (ref 3.81–5.12)
RBC # BLD AUTO: 4.5 MILLION/UL (ref 3.81–5.12)
RESPIRATORY RATE: 0
SALICYLATES SERPL-MCNC: <3 MG/DL (ref 3–20)
SAO2 % BLD FROM PO2: 99 % (ref 60–85)
SL AMB  POCT GLUCOSE, UA: NEGATIVE
SL AMB LEUKOCYTE ESTERASE,UA: NEGATIVE
SL AMB POCT BILIRUBIN,UA: NEGATIVE
SL AMB POCT BLOOD,UA: NEGATIVE
SL AMB POCT CLARITY,UA: CLEAR
SL AMB POCT COLOR,UA: YELLOW
SL AMB POCT KETONES,UA: NEGATIVE
SL AMB POCT NITRITE,UA: NEGATIVE
SL AMB POCT PH,UA: 5.5
SL AMB POCT SPECIFIC GRAVITY,UA: 1.03
SL AMB POCT URINE PROTEIN: ABNORMAL
SL AMB POCT UROBILINOGEN: 0.2
SODIUM BLD-SCNC: 139 MMOL/L (ref 136–145)
SODIUM SERPL-SCNC: 140 MMOL/L (ref 136–145)
SODIUM SERPL-SCNC: 143 MMOL/L (ref 136–145)
SPECIMEN SOURCE: ABNORMAL
T WAVE AXIS: 42 DEGREES
T WAVE AXIS: 64 DEGREES
T WAVE AXIS: 80 DEGREES
THC UR QL: NEGATIVE
TRIGL SERPL-MCNC: 125 MG/DL
TROPONIN I SERPL-MCNC: <0.02 NG/ML
TSH SERPL DL<=0.05 MIU/L-ACNC: 1.89 UIU/ML (ref 0.36–3.74)
TSH SERPL DL<=0.05 MIU/L-ACNC: 2.8 UIU/ML (ref 0.36–3.74)
VENT AC: 12
VENT AC: 16
VENT AC: 16
VENT- AC: AC
VENTILATION VALUE: 16
VENTRICULAR RATE: 111 BPM
VENTRICULAR RATE: 125 BPM
VENTRICULAR RATE: 90 BPM
VT SETTING VENT: 420 ML
VT SETTING VENT: 450 ML
VT SETTING VENT: 450 ML
WBC # BLD AUTO: 13.78 THOUSAND/UL (ref 4.31–10.16)
WBC # BLD AUTO: 15.69 THOUSAND/UL (ref 4.31–10.16)
WBC # BLD AUTO: 6.74 THOUSAND/UL (ref 4.31–10.16)
WBC # BLD AUTO: 7.51 THOUSAND/UL (ref 4.31–10.16)

## 2019-01-01 PROCEDURE — 93010 ELECTROCARDIOGRAM REPORT: CPT | Performed by: INTERNAL MEDICINE

## 2019-01-01 PROCEDURE — 5A1945Z RESPIRATORY VENTILATION, 24-96 CONSECUTIVE HOURS: ICD-10-PCS | Performed by: INTERNAL MEDICINE

## 2019-01-01 PROCEDURE — 80048 BASIC METABOLIC PNL TOTAL CA: CPT

## 2019-01-01 PROCEDURE — 97110 THERAPEUTIC EXERCISES: CPT

## 2019-01-01 PROCEDURE — 81003 URINALYSIS AUTO W/O SCOPE: CPT | Performed by: FAMILY MEDICINE

## 2019-01-01 PROCEDURE — NC001 PR NO CHARGE: Performed by: NURSE PRACTITIONER

## 2019-01-01 PROCEDURE — 73130 X-RAY EXAM OF HAND: CPT

## 2019-01-01 PROCEDURE — 99024 POSTOP FOLLOW-UP VISIT: CPT | Performed by: ORTHOPAEDIC SURGERY

## 2019-01-01 PROCEDURE — C1713 ANCHOR/SCREW BN/BN,TIS/BN: HCPCS | Performed by: ORTHOPAEDIC SURGERY

## 2019-01-01 PROCEDURE — 97140 MANUAL THERAPY 1/> REGIONS: CPT

## 2019-01-01 PROCEDURE — 20550 NJX 1 TENDON SHEATH/LIGAMENT: CPT

## 2019-01-01 PROCEDURE — 99291 CRITICAL CARE FIRST HOUR: CPT | Performed by: EMERGENCY MEDICINE

## 2019-01-01 PROCEDURE — 84132 ASSAY OF SERUM POTASSIUM: CPT

## 2019-01-01 PROCEDURE — 85027 COMPLETE CBC AUTOMATED: CPT | Performed by: NURSE PRACTITIONER

## 2019-01-01 PROCEDURE — 70450 CT HEAD/BRAIN W/O DYE: CPT

## 2019-01-01 PROCEDURE — 97110 THERAPEUTIC EXERCISES: CPT | Performed by: PHYSICAL THERAPIST

## 2019-01-01 PROCEDURE — 83605 ASSAY OF LACTIC ACID: CPT | Performed by: EMERGENCY MEDICINE

## 2019-01-01 PROCEDURE — 85025 COMPLETE CBC W/AUTO DIFF WBC: CPT

## 2019-01-01 PROCEDURE — 97140 MANUAL THERAPY 1/> REGIONS: CPT | Performed by: PHYSICAL THERAPIST

## 2019-01-01 PROCEDURE — 36600 WITHDRAWAL OF ARTERIAL BLOOD: CPT

## 2019-01-01 PROCEDURE — 94002 VENT MGMT INPAT INIT DAY: CPT

## 2019-01-01 PROCEDURE — 85025 COMPLETE CBC W/AUTO DIFF WBC: CPT | Performed by: EMERGENCY MEDICINE

## 2019-01-01 PROCEDURE — 85610 PROTHROMBIN TIME: CPT | Performed by: EMERGENCY MEDICINE

## 2019-01-01 PROCEDURE — 84443 ASSAY THYROID STIM HORMONE: CPT

## 2019-01-01 PROCEDURE — 73110 X-RAY EXAM OF WRIST: CPT

## 2019-01-01 PROCEDURE — 82947 ASSAY GLUCOSE BLOOD QUANT: CPT

## 2019-01-01 PROCEDURE — 71045 X-RAY EXAM CHEST 1 VIEW: CPT

## 2019-01-01 PROCEDURE — 96374 THER/PROPH/DIAG INJ IV PUSH: CPT

## 2019-01-01 PROCEDURE — 99024 POSTOP FOLLOW-UP VISIT: CPT | Performed by: PHYSICIAN ASSISTANT

## 2019-01-01 PROCEDURE — 83735 ASSAY OF MAGNESIUM: CPT | Performed by: EMERGENCY MEDICINE

## 2019-01-01 PROCEDURE — 96361 HYDRATE IV INFUSION ADD-ON: CPT

## 2019-01-01 PROCEDURE — 25607 OPTX DST RD XARTC FX/EPI SEP: CPT | Performed by: ORTHOPAEDIC SURGERY

## 2019-01-01 PROCEDURE — 80329 ANALGESICS NON-OPIOID 1 OR 2: CPT | Performed by: EMERGENCY MEDICINE

## 2019-01-01 PROCEDURE — 80053 COMPREHEN METABOLIC PANEL: CPT | Performed by: EMERGENCY MEDICINE

## 2019-01-01 PROCEDURE — 80061 LIPID PANEL: CPT

## 2019-01-01 PROCEDURE — 99223 1ST HOSP IP/OBS HIGH 75: CPT | Performed by: PHYSICIAN ASSISTANT

## 2019-01-01 PROCEDURE — 99213 OFFICE O/P EST LOW 20 MIN: CPT | Performed by: FAMILY MEDICINE

## 2019-01-01 PROCEDURE — 82805 BLOOD GASES W/O2 SATURATION: CPT | Performed by: NURSE PRACTITIONER

## 2019-01-01 PROCEDURE — 84484 ASSAY OF TROPONIN QUANT: CPT | Performed by: EMERGENCY MEDICINE

## 2019-01-01 PROCEDURE — 83036 HEMOGLOBIN GLYCOSYLATED A1C: CPT

## 2019-01-01 PROCEDURE — 82803 BLOOD GASES ANY COMBINATION: CPT

## 2019-01-01 PROCEDURE — 94003 VENT MGMT INPAT SUBQ DAY: CPT

## 2019-01-01 PROCEDURE — 99291 CRITICAL CARE FIRST HOUR: CPT | Performed by: INTERNAL MEDICINE

## 2019-01-01 PROCEDURE — 99214 OFFICE O/P EST MOD 30 MIN: CPT | Performed by: ORTHOPAEDIC SURGERY

## 2019-01-01 PROCEDURE — 36415 COLL VENOUS BLD VENIPUNCTURE: CPT

## 2019-01-01 PROCEDURE — 25607 OPTX DST RD XARTC FX/EPI SEP: CPT | Performed by: PHYSICIAN ASSISTANT

## 2019-01-01 PROCEDURE — 87040 BLOOD CULTURE FOR BACTERIA: CPT | Performed by: EMERGENCY MEDICINE

## 2019-01-01 PROCEDURE — 83605 ASSAY OF LACTIC ACID: CPT | Performed by: NURSE PRACTITIONER

## 2019-01-01 PROCEDURE — 84443 ASSAY THYROID STIM HORMONE: CPT | Performed by: EMERGENCY MEDICINE

## 2019-01-01 PROCEDURE — 85730 THROMBOPLASTIN TIME PARTIAL: CPT | Performed by: EMERGENCY MEDICINE

## 2019-01-01 PROCEDURE — 99291 CRITICAL CARE FIRST HOUR: CPT

## 2019-01-01 PROCEDURE — 84100 ASSAY OF PHOSPHORUS: CPT | Performed by: EMERGENCY MEDICINE

## 2019-01-01 PROCEDURE — 80053 COMPREHEN METABOLIC PANEL: CPT

## 2019-01-01 PROCEDURE — 93005 ELECTROCARDIOGRAM TRACING: CPT

## 2019-01-01 PROCEDURE — 82948 REAGENT STRIP/BLOOD GLUCOSE: CPT

## 2019-01-01 PROCEDURE — 96365 THER/PROPH/DIAG IV INF INIT: CPT

## 2019-01-01 PROCEDURE — 70486 CT MAXILLOFACIAL W/O DYE: CPT

## 2019-01-01 PROCEDURE — 83690 ASSAY OF LIPASE: CPT | Performed by: EMERGENCY MEDICINE

## 2019-01-01 PROCEDURE — 80048 BASIC METABOLIC PNL TOTAL CA: CPT | Performed by: NURSE PRACTITIONER

## 2019-01-01 PROCEDURE — 73100 X-RAY EXAM OF WRIST: CPT

## 2019-01-01 PROCEDURE — 76536 US EXAM OF HEAD AND NECK: CPT

## 2019-01-01 PROCEDURE — 82805 BLOOD GASES W/O2 SATURATION: CPT | Performed by: EMERGENCY MEDICINE

## 2019-01-01 PROCEDURE — 83880 ASSAY OF NATRIURETIC PEPTIDE: CPT | Performed by: EMERGENCY MEDICINE

## 2019-01-01 PROCEDURE — 99213 OFFICE O/P EST LOW 20 MIN: CPT | Performed by: PHYSICIAN ASSISTANT

## 2019-01-01 PROCEDURE — 82330 ASSAY OF CALCIUM: CPT

## 2019-01-01 PROCEDURE — 99214 OFFICE O/P EST MOD 30 MIN: CPT | Performed by: FAMILY MEDICINE

## 2019-01-01 PROCEDURE — 1123F ACP DISCUSS/DSCN MKR DOCD: CPT | Performed by: INTERNAL MEDICINE

## 2019-01-01 PROCEDURE — 36415 COLL VENOUS BLD VENIPUNCTURE: CPT | Performed by: EMERGENCY MEDICINE

## 2019-01-01 PROCEDURE — 0BH17EZ INSERTION OF ENDOTRACHEAL AIRWAY INTO TRACHEA, VIA NATURAL OR ARTIFICIAL OPENING: ICD-10-PCS | Performed by: INTERNAL MEDICINE

## 2019-01-01 PROCEDURE — 99219 PR INITIAL OBSERVATION CARE/DAY 50 MINUTES: CPT | Performed by: INTERNAL MEDICINE

## 2019-01-01 PROCEDURE — 85014 HEMATOCRIT: CPT

## 2019-01-01 PROCEDURE — G0463 HOSPITAL OUTPT CLINIC VISIT: HCPCS | Performed by: PHYSICIAN ASSISTANT

## 2019-01-01 PROCEDURE — 1123F ACP DISCUSS/DSCN MKR DOCD: CPT | Performed by: ORTHOPAEDIC SURGERY

## 2019-01-01 PROCEDURE — 99204 OFFICE O/P NEW MOD 45 MIN: CPT | Performed by: ORTHOPAEDIC SURGERY

## 2019-01-01 PROCEDURE — 80307 DRUG TEST PRSMV CHEM ANLYZR: CPT | Performed by: EMERGENCY MEDICINE

## 2019-01-01 PROCEDURE — NC001 PR NO CHARGE: Performed by: PSYCHIATRY & NEUROLOGY

## 2019-01-01 PROCEDURE — 80320 DRUG SCREEN QUANTALCOHOLS: CPT | Performed by: EMERGENCY MEDICINE

## 2019-01-01 PROCEDURE — 84295 ASSAY OF SERUM SODIUM: CPT

## 2019-01-01 PROCEDURE — 97161 PT EVAL LOW COMPLEX 20 MIN: CPT | Performed by: PHYSICAL THERAPIST

## 2019-01-01 PROCEDURE — 99213 OFFICE O/P EST LOW 20 MIN: CPT | Performed by: ORTHOPAEDIC SURGERY

## 2019-01-01 DEVICE — IMPLANTABLE DEVICE
Type: IMPLANTABLE DEVICE | Site: WRIST | Status: FUNCTIONAL
Brand: DVR ANATOMIC NARROW SHORT LEFT

## 2019-01-01 DEVICE — IMPLANTABLE DEVICE
Type: IMPLANTABLE DEVICE | Site: WRIST | Status: FUNCTIONAL
Brand: PEG SMOOTH

## 2019-01-01 DEVICE — IMPLANTABLE DEVICE
Type: IMPLANTABLE DEVICE | Site: WRIST | Status: FUNCTIONAL
Brand: CORTICAL SCREW

## 2019-01-01 RX ORDER — SENNOSIDES 8.6 MG
1 TABLET ORAL DAILY
Status: DISCONTINUED | OUTPATIENT
Start: 2019-01-01 | End: 2019-01-01 | Stop reason: HOSPADM

## 2019-01-01 RX ORDER — ROSUVASTATIN CALCIUM 20 MG/1
20 TABLET, COATED ORAL
Qty: 90 TABLET | Refills: 1 | Status: SHIPPED | OUTPATIENT
Start: 2019-01-01 | End: 2019-01-01 | Stop reason: HOSPADM

## 2019-01-01 RX ORDER — CARBAMAZEPINE 200 MG/1
200 TABLET ORAL SEE ADMIN INSTRUCTIONS
Status: DISCONTINUED | OUTPATIENT
Start: 2019-01-01 | End: 2019-01-01 | Stop reason: HOSPADM

## 2019-01-01 RX ORDER — BUPIVACAINE HYDROCHLORIDE 2.5 MG/ML
0.5 INJECTION, SOLUTION INFILTRATION; PERINEURAL
Status: COMPLETED | OUTPATIENT
Start: 2019-01-01 | End: 2019-01-01

## 2019-01-01 RX ORDER — MONTELUKAST SODIUM 10 MG/1
10 TABLET ORAL
Status: DISCONTINUED | OUTPATIENT
Start: 2019-01-01 | End: 2019-01-01 | Stop reason: HOSPADM

## 2019-01-01 RX ORDER — OXYCODONE HYDROCHLORIDE AND ACETAMINOPHEN 5; 325 MG/1; MG/1
1 TABLET ORAL EVERY 4 HOURS PRN
Status: DISCONTINUED | OUTPATIENT
Start: 2019-01-01 | End: 2019-01-01 | Stop reason: HOSPADM

## 2019-01-01 RX ORDER — PROPOFOL 10 MG/ML
INJECTION, EMULSION INTRAVENOUS AS NEEDED
Status: DISCONTINUED | OUTPATIENT
Start: 2019-01-01 | End: 2019-01-01 | Stop reason: SURG

## 2019-01-01 RX ORDER — HYDRALAZINE HYDROCHLORIDE 20 MG/ML
10 INJECTION INTRAMUSCULAR; INTRAVENOUS ONCE
Status: COMPLETED | OUTPATIENT
Start: 2019-01-01 | End: 2019-01-01

## 2019-01-01 RX ORDER — KETAMINE HCL IN NACL, ISO-OSM 100MG/10ML
SYRINGE (ML) INJECTION AS NEEDED
Status: DISCONTINUED | OUTPATIENT
Start: 2019-01-01 | End: 2019-01-01 | Stop reason: SURG

## 2019-01-01 RX ORDER — FLUTICASONE PROPIONATE 50 MCG
2 SPRAY, SUSPENSION (ML) NASAL DAILY
Qty: 16 G | Refills: 0 | Status: SHIPPED | OUTPATIENT
Start: 2019-01-01 | End: 2019-01-01 | Stop reason: HOSPADM

## 2019-01-01 RX ORDER — NOREPINEPHRINE BITARTRATE 1 MG/ML
INJECTION, SOLUTION INTRAVENOUS
Status: DISPENSED
Start: 2019-01-01 | End: 2019-01-01

## 2019-01-01 RX ORDER — DOCUSATE SODIUM 100 MG/1
100 CAPSULE, LIQUID FILLED ORAL 2 TIMES DAILY
Status: DISCONTINUED | OUTPATIENT
Start: 2019-01-01 | End: 2019-01-01 | Stop reason: HOSPADM

## 2019-01-01 RX ORDER — MONTELUKAST SODIUM 10 MG/1
10 TABLET ORAL
Qty: 30 TABLET | Refills: 5 | Status: SHIPPED | OUTPATIENT
Start: 2019-01-01 | End: 2019-01-01 | Stop reason: HOSPADM

## 2019-01-01 RX ORDER — VENLAFAXINE HYDROCHLORIDE 75 MG/1
75 CAPSULE, EXTENDED RELEASE ORAL EVERY MORNING
Status: DISCONTINUED | OUTPATIENT
Start: 2019-01-01 | End: 2019-01-01 | Stop reason: HOSPADM

## 2019-01-01 RX ORDER — LEVALBUTEROL 1.25 MG/.5ML
1.25 SOLUTION, CONCENTRATE RESPIRATORY (INHALATION) EVERY 8 HOURS PRN
Status: DISCONTINUED | OUTPATIENT
Start: 2019-01-01 | End: 2019-01-01

## 2019-01-01 RX ORDER — CALCIUM CARBONATE 200(500)MG
1000 TABLET,CHEWABLE ORAL DAILY PRN
Status: DISCONTINUED | OUTPATIENT
Start: 2019-01-01 | End: 2019-01-01 | Stop reason: HOSPADM

## 2019-01-01 RX ORDER — ALBUTEROL SULFATE 90 UG/1
2 AEROSOL, METERED RESPIRATORY (INHALATION) EVERY 4 HOURS PRN
Status: DISCONTINUED | OUTPATIENT
Start: 2019-01-01 | End: 2019-01-01 | Stop reason: HOSPADM

## 2019-01-01 RX ORDER — ALPRAZOLAM 0.25 MG/1
0.25 TABLET ORAL 2 TIMES DAILY PRN
Status: DISCONTINUED | OUTPATIENT
Start: 2019-01-01 | End: 2019-01-01 | Stop reason: HOSPADM

## 2019-01-01 RX ORDER — OXYCODONE HYDROCHLORIDE AND ACETAMINOPHEN 5; 325 MG/1; MG/1
2 TABLET ORAL EVERY 4 HOURS PRN
Status: DISCONTINUED | OUTPATIENT
Start: 2019-01-01 | End: 2019-01-01 | Stop reason: HOSPADM

## 2019-01-01 RX ORDER — MELATONIN
1000 EVERY MORNING
Status: DISCONTINUED | OUTPATIENT
Start: 2019-01-01 | End: 2019-01-01 | Stop reason: HOSPADM

## 2019-01-01 RX ORDER — HYDROMORPHONE HCL/PF 1 MG/ML
0.5 SYRINGE (ML) INJECTION
Status: DISCONTINUED | OUTPATIENT
Start: 2019-01-01 | End: 2019-01-01 | Stop reason: HOSPADM

## 2019-01-01 RX ORDER — BECLOMETHASONE DIPROPIONATE HFA 80 UG/1
AEROSOL, METERED RESPIRATORY (INHALATION)
Qty: 10.6 G | Refills: 0 | Status: SHIPPED | OUTPATIENT
Start: 2019-01-01 | End: 2019-01-01 | Stop reason: HOSPADM

## 2019-01-01 RX ORDER — ALBUMIN, HUMAN INJ 5% 5 %
12.5 SOLUTION INTRAVENOUS ONCE
Status: COMPLETED | OUTPATIENT
Start: 2019-01-01 | End: 2019-01-01

## 2019-01-01 RX ORDER — PROPOFOL 10 MG/ML
5-50 INJECTION, EMULSION INTRAVENOUS
Status: DISCONTINUED | OUTPATIENT
Start: 2019-01-01 | End: 2019-01-01

## 2019-01-01 RX ORDER — LEVOTHYROXINE SODIUM 0.03 MG/1
25 TABLET ORAL EVERY MORNING
Qty: 90 TABLET | Refills: 1 | Status: SHIPPED | OUTPATIENT
Start: 2019-01-01 | End: 2019-01-01 | Stop reason: HOSPADM

## 2019-01-01 RX ORDER — NAPROXEN 500 MG/1
500 TABLET ORAL 2 TIMES DAILY WITH MEALS
Qty: 60 TABLET | Refills: 0 | Status: SHIPPED | OUTPATIENT
Start: 2019-01-01 | End: 2019-01-01 | Stop reason: HOSPADM

## 2019-01-01 RX ORDER — HYDRALAZINE HYDROCHLORIDE 20 MG/ML
20 INJECTION INTRAMUSCULAR; INTRAVENOUS EVERY 6 HOURS PRN
Status: DISCONTINUED | OUTPATIENT
Start: 2019-01-01 | End: 2019-01-01

## 2019-01-01 RX ORDER — NYSTATIN 100000 U/G
CREAM TOPICAL 2 TIMES DAILY
Qty: 30 G | Refills: 0 | Status: SHIPPED | OUTPATIENT
Start: 2019-01-01 | End: 2019-01-01 | Stop reason: HOSPADM

## 2019-01-01 RX ORDER — SODIUM CHLORIDE 9 MG/ML
125 INJECTION, SOLUTION INTRAVENOUS CONTINUOUS
Status: DISCONTINUED | OUTPATIENT
Start: 2019-01-01 | End: 2019-01-01

## 2019-01-01 RX ORDER — SODIUM CHLORIDE 9 MG/ML
125 INJECTION, SOLUTION INTRAVENOUS CONTINUOUS
Status: CANCELLED | OUTPATIENT
Start: 2019-01-01

## 2019-01-01 RX ORDER — FERROUS SULFATE 325(65) MG
325 TABLET ORAL 2 TIMES DAILY
Status: DISCONTINUED | OUTPATIENT
Start: 2019-01-01 | End: 2019-01-01 | Stop reason: HOSPADM

## 2019-01-01 RX ORDER — RANITIDINE HYDROCHLORIDE 15 MG/ML
75 SOLUTION ORAL 2 TIMES DAILY
Status: DISCONTINUED | OUTPATIENT
Start: 2019-01-01 | End: 2019-01-01 | Stop reason: CLARIF

## 2019-01-01 RX ORDER — DOPAMINE HYDROCHLORIDE 160 MG/100ML
1-20 INJECTION, SOLUTION INTRAVENOUS
Status: DISCONTINUED | OUTPATIENT
Start: 2019-01-01 | End: 2019-01-01

## 2019-01-01 RX ORDER — LEVOTHYROXINE SODIUM 0.03 MG/1
25 TABLET ORAL EVERY MORNING
Status: DISCONTINUED | OUTPATIENT
Start: 2019-01-01 | End: 2019-01-01 | Stop reason: HOSPADM

## 2019-01-01 RX ORDER — FENTANYL CITRATE 50 UG/ML
25 INJECTION, SOLUTION INTRAMUSCULAR; INTRAVENOUS ONCE
Status: COMPLETED | OUTPATIENT
Start: 2019-01-01 | End: 2019-01-01

## 2019-01-01 RX ORDER — PROMETHAZINE HYDROCHLORIDE 12.5 MG/1
12.5 TABLET ORAL EVERY 6 HOURS PRN
Qty: 30 TABLET | Refills: 0 | Status: SHIPPED | OUTPATIENT
Start: 2019-01-01 | End: 2019-01-01 | Stop reason: SDUPTHER

## 2019-01-01 RX ORDER — FLUTICASONE PROPIONATE 50 MCG
2 SPRAY, SUSPENSION (ML) NASAL DAILY
Status: DISCONTINUED | OUTPATIENT
Start: 2019-01-01 | End: 2019-01-01 | Stop reason: HOSPADM

## 2019-01-01 RX ORDER — IPRATROPIUM BROMIDE 42 UG/1
2 SPRAY, METERED NASAL 3 TIMES DAILY
Qty: 15 ML | Refills: 0 | Status: SHIPPED | OUTPATIENT
Start: 2019-01-01 | End: 2019-01-01 | Stop reason: HOSPADM

## 2019-01-01 RX ORDER — HYDRALAZINE HYDROCHLORIDE 20 MG/ML
10 INJECTION INTRAMUSCULAR; INTRAVENOUS EVERY 6 HOURS PRN
Status: DISCONTINUED | OUTPATIENT
Start: 2019-01-01 | End: 2019-01-01

## 2019-01-01 RX ORDER — ACETAMINOPHEN 325 MG/1
650 TABLET ORAL AS NEEDED
Status: DISCONTINUED | OUTPATIENT
Start: 2019-01-01 | End: 2019-01-01 | Stop reason: HOSPADM

## 2019-01-01 RX ORDER — PROMETHAZINE HYDROCHLORIDE 12.5 MG/1
12.5 TABLET ORAL EVERY 6 HOURS PRN
Qty: 30 TABLET | Refills: 0 | Status: SHIPPED | OUTPATIENT
Start: 2019-01-01 | End: 2019-01-01 | Stop reason: HOSPADM

## 2019-01-01 RX ORDER — ONDANSETRON 2 MG/ML
4 INJECTION INTRAMUSCULAR; INTRAVENOUS EVERY 6 HOURS PRN
Status: DISCONTINUED | OUTPATIENT
Start: 2019-01-01 | End: 2019-01-01 | Stop reason: HOSPADM

## 2019-01-01 RX ORDER — ACETAMINOPHEN 160 MG/5ML
650 SUSPENSION, ORAL (FINAL DOSE FORM) ORAL EVERY 4 HOURS PRN
Status: DISCONTINUED | OUTPATIENT
Start: 2019-01-01 | End: 2019-01-01

## 2019-01-01 RX ORDER — LEVOTHYROXINE SODIUM 0.03 MG/1
25 TABLET ORAL EVERY MORNING
Status: DISCONTINUED | OUTPATIENT
Start: 2019-01-01 | End: 2019-01-01

## 2019-01-01 RX ORDER — FENTANYL CITRATE 50 UG/ML
INJECTION, SOLUTION INTRAMUSCULAR; INTRAVENOUS AS NEEDED
Status: DISCONTINUED | OUTPATIENT
Start: 2019-01-01 | End: 2019-01-01 | Stop reason: SURG

## 2019-01-01 RX ORDER — SODIUM CHLORIDE, SODIUM LACTATE, POTASSIUM CHLORIDE, CALCIUM CHLORIDE 600; 310; 30; 20 MG/100ML; MG/100ML; MG/100ML; MG/100ML
50 INJECTION, SOLUTION INTRAVENOUS CONTINUOUS
Status: DISCONTINUED | OUTPATIENT
Start: 2019-01-01 | End: 2019-01-01 | Stop reason: HOSPADM

## 2019-01-01 RX ORDER — MONTELUKAST SODIUM 10 MG/1
TABLET ORAL
Qty: 30 TABLET | Refills: 5 | Status: SHIPPED | OUTPATIENT
Start: 2019-01-01 | End: 2019-01-01 | Stop reason: SDUPTHER

## 2019-01-01 RX ORDER — ONDANSETRON 2 MG/ML
INJECTION INTRAMUSCULAR; INTRAVENOUS AS NEEDED
Status: DISCONTINUED | OUTPATIENT
Start: 2019-01-01 | End: 2019-01-01 | Stop reason: SURG

## 2019-01-01 RX ORDER — METHYLPREDNISOLONE ACETATE 40 MG/ML
0.5 INJECTION, SUSPENSION INTRA-ARTICULAR; INTRALESIONAL; INTRAMUSCULAR; SOFT TISSUE
Status: COMPLETED | OUTPATIENT
Start: 2019-01-01 | End: 2019-01-01

## 2019-01-01 RX ORDER — MIDAZOLAM HYDROCHLORIDE 1 MG/ML
INJECTION INTRAMUSCULAR; INTRAVENOUS AS NEEDED
Status: DISCONTINUED | OUTPATIENT
Start: 2019-01-01 | End: 2019-01-01 | Stop reason: SURG

## 2019-01-01 RX ORDER — OXYCODONE HYDROCHLORIDE 5 MG/1
5 TABLET ORAL EVERY 4 HOURS PRN
Qty: 30 TABLET | Refills: 0 | Status: SHIPPED | OUTPATIENT
Start: 2019-01-01 | End: 2019-01-01 | Stop reason: SDUPTHER

## 2019-01-01 RX ORDER — LEVALBUTEROL 1.25 MG/.5ML
1.25 SOLUTION, CONCENTRATE RESPIRATORY (INHALATION) EVERY 8 HOURS PRN
Status: DISCONTINUED | OUTPATIENT
Start: 2019-01-01 | End: 2019-01-01 | Stop reason: HOSPADM

## 2019-01-01 RX ORDER — CHLORHEXIDINE GLUCONATE 0.12 MG/ML
15 RINSE ORAL EVERY 12 HOURS SCHEDULED
Status: DISCONTINUED | OUTPATIENT
Start: 2019-01-01 | End: 2019-01-01

## 2019-01-01 RX ORDER — OXYCODONE HYDROCHLORIDE 5 MG/1
5 TABLET ORAL EVERY 4 HOURS PRN
Qty: 30 TABLET | Refills: 0 | Status: SHIPPED | OUTPATIENT
Start: 2019-01-01 | End: 2019-01-01

## 2019-01-01 RX ORDER — ONDANSETRON 2 MG/ML
4 INJECTION INTRAMUSCULAR; INTRAVENOUS ONCE AS NEEDED
Status: DISCONTINUED | OUTPATIENT
Start: 2019-01-01 | End: 2019-01-01 | Stop reason: HOSPADM

## 2019-01-01 RX ORDER — POTASSIUM CHLORIDE 20MEQ/15ML
40 LIQUID (ML) ORAL ONCE
Status: COMPLETED | OUTPATIENT
Start: 2019-01-01 | End: 2019-01-01

## 2019-01-01 RX ORDER — MAGNESIUM SULFATE HEPTAHYDRATE 40 MG/ML
4 INJECTION, SOLUTION INTRAVENOUS ONCE
Status: COMPLETED | OUTPATIENT
Start: 2019-01-01 | End: 2019-01-01

## 2019-01-01 RX ORDER — FAMOTIDINE 40 MG/5ML
10 POWDER, FOR SUSPENSION ORAL 2 TIMES DAILY
Status: DISCONTINUED | OUTPATIENT
Start: 2019-01-01 | End: 2019-01-01

## 2019-01-01 RX ORDER — FENTANYL CITRATE/PF 50 MCG/ML
25 SYRINGE (ML) INJECTION
Status: DISCONTINUED | OUTPATIENT
Start: 2019-01-01 | End: 2019-01-01 | Stop reason: HOSPADM

## 2019-01-01 RX ADMIN — FENTANYL CITRATE 25 MCG: 50 INJECTION, SOLUTION INTRAMUSCULAR; INTRAVENOUS at 13:01

## 2019-01-01 RX ADMIN — SENNOSIDES 8.6 MG: 8.6 TABLET, FILM COATED ORAL at 08:39

## 2019-01-01 RX ADMIN — VENLAFAXINE HYDROCHLORIDE 75 MG: 75 CAPSULE, EXTENDED RELEASE ORAL at 11:42

## 2019-01-01 RX ADMIN — BUPIVACAINE HYDROCHLORIDE 0.5 ML: 2.5 INJECTION, SOLUTION INFILTRATION; PERINEURAL at 11:36

## 2019-01-01 RX ADMIN — ACETAMINOPHEN 650 MG: 160 SUSPENSION ORAL at 15:46

## 2019-01-01 RX ADMIN — FENTANYL CITRATE 25 MCG: 50 INJECTION, SOLUTION INTRAMUSCULAR; INTRAVENOUS at 13:12

## 2019-01-01 RX ADMIN — FAMOTIDINE 10 MG: 40 POWDER, FOR SUSPENSION ORAL at 06:05

## 2019-01-01 RX ADMIN — OXYCODONE HYDROCHLORIDE AND ACETAMINOPHEN 2 TABLET: 5; 325 TABLET ORAL at 11:41

## 2019-01-01 RX ADMIN — OXYCODONE HYDROCHLORIDE AND ACETAMINOPHEN 2 TABLET: 5; 325 TABLET ORAL at 02:51

## 2019-01-01 RX ADMIN — FENTANYL CITRATE 50 MCG: 50 INJECTION, SOLUTION INTRAMUSCULAR; INTRAVENOUS at 12:57

## 2019-01-01 RX ADMIN — HYDRALAZINE HYDROCHLORIDE 10 MG: 20 INJECTION INTRAMUSCULAR; INTRAVENOUS at 11:28

## 2019-01-01 RX ADMIN — FENTANYL CITRATE 12.5 MCG: 50 INJECTION, SOLUTION INTRAMUSCULAR; INTRAVENOUS at 12:29

## 2019-01-01 RX ADMIN — HYDROMORPHONE HYDROCHLORIDE 0.5 MG: 1 INJECTION, SOLUTION INTRAMUSCULAR; INTRAVENOUS; SUBCUTANEOUS at 13:34

## 2019-01-01 RX ADMIN — OXYCODONE HYDROCHLORIDE AND ACETAMINOPHEN 1 TABLET: 5; 325 TABLET ORAL at 22:45

## 2019-01-01 RX ADMIN — NOREPINEPHRINE BITARTRATE 5 MCG/MIN: 1 INJECTION INTRAVENOUS at 17:00

## 2019-01-01 RX ADMIN — NOREPINEPHRINE BITARTRATE 7 MCG/MIN: 1 INJECTION INTRAVENOUS at 23:35

## 2019-01-01 RX ADMIN — FENTANYL CITRATE 25 MCG: 50 INJECTION, SOLUTION INTRAMUSCULAR; INTRAVENOUS at 08:59

## 2019-01-01 RX ADMIN — NOREPINEPHRINE BITARTRATE 9 MCG/MIN: 1 INJECTION INTRAVENOUS at 07:27

## 2019-01-01 RX ADMIN — PROPOFOL 5 MCG/KG/MIN: 10 INJECTION, EMULSION INTRAVENOUS at 09:03

## 2019-01-01 RX ADMIN — CHLORHEXIDINE GLUCONATE 0.12% ORAL RINSE 15 ML: 1.2 LIQUID ORAL at 09:00

## 2019-01-01 RX ADMIN — SODIUM CHLORIDE 125 ML/HR: 0.9 INJECTION, SOLUTION INTRAVENOUS at 11:47

## 2019-01-01 RX ADMIN — METHYLPREDNISOLONE ACETATE 0.5 ML: 40 INJECTION, SUSPENSION INTRA-ARTICULAR; INTRALESIONAL; INTRAMUSCULAR; SOFT TISSUE at 11:36

## 2019-01-01 RX ADMIN — POTASSIUM CHLORIDE 40 MEQ: 1.5 SOLUTION ORAL at 15:26

## 2019-01-01 RX ADMIN — NOREPINEPHRINE BITARTRATE 4 MCG/MIN: 1 INJECTION INTRAVENOUS at 07:32

## 2019-01-01 RX ADMIN — MAGNESIUM SULFATE HEPTAHYDRATE 4 G: 40 INJECTION, SOLUTION INTRAVENOUS at 20:35

## 2019-01-01 RX ADMIN — LEVOTHYROXINE SODIUM 25 MCG: 25 TABLET ORAL at 09:00

## 2019-01-01 RX ADMIN — DOCUSATE SODIUM 100 MG: 100 CAPSULE, LIQUID FILLED ORAL at 07:30

## 2019-01-01 RX ADMIN — ALBUMIN (HUMAN) 12.5 G: 12.5 SOLUTION INTRAVENOUS at 01:35

## 2019-01-01 RX ADMIN — Medication 25 MG: at 12:35

## 2019-01-01 RX ADMIN — CHLORHEXIDINE GLUCONATE 0.12% ORAL RINSE 15 ML: 1.2 LIQUID ORAL at 20:35

## 2019-01-01 RX ADMIN — LEVALBUTEROL 1.25 MG: 1.25 SOLUTION, CONCENTRATE RESPIRATORY (INHALATION) at 15:05

## 2019-01-01 RX ADMIN — SODIUM CHLORIDE 125 ML/HR: 0.9 INJECTION, SOLUTION INTRAVENOUS at 16:56

## 2019-01-01 RX ADMIN — SODIUM CHLORIDE 1000 ML: 0.9 INJECTION, SOLUTION INTRAVENOUS at 08:36

## 2019-01-01 RX ADMIN — FERROUS SULFATE TAB 325 MG (65 MG ELEMENTAL FE) 325 MG: 325 (65 FE) TAB at 11:42

## 2019-01-01 RX ADMIN — LEVOTHYROXINE SODIUM 25 MCG: 25 TABLET ORAL at 08:07

## 2019-01-01 RX ADMIN — BUPIVACAINE HYDROCHLORIDE 0.5 ML: 2.5 INJECTION, SOLUTION INFILTRATION; PERINEURAL at 11:37

## 2019-01-01 RX ADMIN — MIDAZOLAM 4 MG: 1 INJECTION INTRAMUSCULAR; INTRAVENOUS at 11:38

## 2019-01-01 RX ADMIN — Medication 1000 MG: at 11:58

## 2019-01-01 RX ADMIN — CHLORHEXIDINE GLUCONATE 0.12% ORAL RINSE 15 ML: 1.2 LIQUID ORAL at 08:07

## 2019-01-01 RX ADMIN — FAMOTIDINE 10 MG: 40 POWDER, FOR SUSPENSION ORAL at 15:26

## 2019-01-01 RX ADMIN — METHYLPREDNISOLONE ACETATE 0.5 ML: 40 INJECTION, SUSPENSION INTRA-ARTICULAR; INTRALESIONAL; INTRAMUSCULAR; SOFT TISSUE at 11:37

## 2019-01-01 RX ADMIN — ALPRAZOLAM 0.25 MG: 0.25 TABLET ORAL at 12:34

## 2019-01-01 RX ADMIN — CHLORHEXIDINE GLUCONATE 0.12% ORAL RINSE 15 ML: 1.2 LIQUID ORAL at 21:31

## 2019-01-01 RX ADMIN — FAMOTIDINE 10 MG: 40 POWDER, FOR SUSPENSION ORAL at 16:34

## 2019-01-01 RX ADMIN — HYDRALAZINE HYDROCHLORIDE 10 MG: 20 INJECTION INTRAMUSCULAR; INTRAVENOUS at 15:56

## 2019-01-01 RX ADMIN — PROPOFOL 200 MG: 10 INJECTION, EMULSION INTRAVENOUS at 11:58

## 2019-01-01 RX ADMIN — FENTANYL CITRATE 12.5 MCG: 50 INJECTION, SOLUTION INTRAMUSCULAR; INTRAVENOUS at 12:25

## 2019-01-01 RX ADMIN — FENTANYL CITRATE 25 MCG: 50 INJECTION, SOLUTION INTRAMUSCULAR; INTRAVENOUS at 13:23

## 2019-01-01 RX ADMIN — ONDANSETRON 4 MG: 2 INJECTION INTRAMUSCULAR; INTRAVENOUS at 11:58

## 2019-01-01 RX ADMIN — OXYCODONE HYDROCHLORIDE AND ACETAMINOPHEN 2 TABLET: 5; 325 TABLET ORAL at 07:30

## 2019-01-01 RX ADMIN — FAMOTIDINE 10 MG: 40 POWDER, FOR SUSPENSION ORAL at 06:15

## 2019-01-01 RX ADMIN — HYDROMORPHONE HYDROCHLORIDE 0.5 MG: 1 INJECTION, SOLUTION INTRAMUSCULAR; INTRAVENOUS; SUBCUTANEOUS at 13:49

## 2019-01-01 RX ADMIN — OXYCODONE HYDROCHLORIDE AND ACETAMINOPHEN 1 TABLET: 5; 325 TABLET ORAL at 18:45

## 2019-01-01 RX ADMIN — LEVOTHYROXINE SODIUM 25 MCG: 25 TABLET ORAL at 11:42

## 2019-01-01 RX ADMIN — MORPHINE SULFATE 2 MG: 2 INJECTION, SOLUTION INTRAMUSCULAR; INTRAVENOUS at 16:36

## 2019-01-01 RX ADMIN — NOREPINEPHRINE BITARTRATE 7 MCG/MIN: 1 INJECTION INTRAVENOUS at 15:28

## 2019-01-01 RX ADMIN — VITAMIN D, TAB 1000IU (100/BT) 1000 UNITS: 25 TAB at 11:42

## 2019-01-01 RX ADMIN — SODIUM CHLORIDE, SODIUM LACTATE, POTASSIUM CHLORIDE, AND CALCIUM CHLORIDE 50 ML/HR: .6; .31; .03; .02 INJECTION, SOLUTION INTRAVENOUS at 18:59

## 2019-01-01 RX ADMIN — SODIUM CHLORIDE 125 ML/HR: 0.9 INJECTION, SOLUTION INTRAVENOUS at 09:54

## 2019-01-16 NOTE — PROGRESS NOTES
APPROVED THROUGH [facility] ID #[id number] UNTIL 12/31/19  PT PREVIOUSLY TRIED PULMICORT AND ASTHMANEX  QVAL IS THE ONLY INHALER SHE CAN USE WITH HER NEUROPATHY IN HER HANDS

## 2019-02-04 NOTE — PROGRESS NOTES
[id number]        NAME: Brayan Friday is a 70 y o  female  : 1950    MRN: [id number]  DATE: 2019  TIME: 4:54 PM    Assessment and Plan   Closed fracture of distal ends of left radius and ulna, initial encounter [G01 502A, S55 602A]  1  Closed fracture of distal ends of left radius and ulna, initial encounter  XR hand 3+ vw left    XR wrist 3+ vw left    Ambulatory referral to Orthopedic Surgery     Patient is neurovascularly intact  Patient placed in a sugar-tong splint  Patient referrals Orthopedics    Patient Instructions       Rest, ice, elevate the affected limb  Take over-the-counter pain medication for symptom relief  Follow up with Orthopedics this week  Call Kamar Mcdowell to schedule an appointment: [phone]  Go to ER if new or worsening symptoms occur  Chief Complaint     Chief Complaint   Patient presents with    Wrist Pain     Patient c/o L hand and wrist pain after she fell today          History of Present Illness       Wrist Pain    Pain location: Left wrist  This is a new problem  The current episode started today  History of extremity trauma: Patient fell, slipped on ice and landed on outstretched left arm  The problem occurs constantly  The problem has been unchanged  The pain is at a severity of 9/10  Associated symptoms include joint swelling and a limited range of motion  Pertinent negatives include no fever, numbness, stiffness or tingling  Exacerbated by: Movement, palpation  She has tried nothing for the symptoms  The treatment provided no relief  Review of Systems   Review of Systems   Constitutional: Negative  Negative for chills, fatigue and fever  HENT: Negative  Eyes: Negative  Respiratory: Negative  Negative for chest tightness, shortness of breath and wheezing  Cardiovascular: Negative  Negative for chest pain and palpitations     Gastrointestinal: Negative for abdominal pain, constipation, diarrhea, nausea and vomiting  Endocrine: Negative  Genitourinary: Negative for dysuria  Musculoskeletal: Positive for joint swelling  Negative for back pain, neck pain, neck stiffness and stiffness  Skin: Negative  Negative for pallor and rash  Allergic/Immunologic: Negative  Neurological: Negative  Negative for tingling, weakness and numbness  Hematological: Negative  Psychiatric/Behavioral: Negative            Current Medications       Current Outpatient Prescriptions:     acetaminophen (TYLENOL) 325 mg tablet, Take 650 mg by mouth, Disp: , Rfl:     ALPRAZolam (XANAX) 0 25 mg tablet, Take 0 25 mg by mouth 3 (three) times a day as needed for anxiety, Disp: , Rfl: 0    aspirin 81 MG tablet, Take 81 mg by mouth daily  , Disp: , Rfl:     azelastine (ASTELIN) 0 1 % nasal spray, 1-2 sprays into each nostril 2 (two) times a day, Disp: 30 mL, Rfl: 0    carBAMazepine (TEGretol) 200 mg tablet, Take 200 mg by mouth daily at bedtime  , Disp: , Rfl: 0    carBAMazepine (TEGretol) 200 mg tablet, Take 100 mg by mouth daily in the early morning, Disp: , Rfl:     cholecalciferol (VITAMIN D3) 1,000 units tablet, Take 1,000 Units by mouth every morning, Disp: , Rfl:     docusate sodium (COLACE) 100 mg capsule, Take 100 mg by mouth as needed  , Disp: , Rfl:     EPINEPHrine (EPIPEN 2-ANALIA) 0 3 mg/0 3 mL SOAJ, Inject as directed, Disp: , Rfl:     Ferrous Sulfate (IRON) 325 (65 Fe) MG TABS, Take 1 tablet by mouth 2 (two) times a day, Disp: , Rfl:     fluticasone (FLONASE) 50 mcg/act nasal spray, 2 sprays into each nostril daily, Disp: 16 g, Rfl: 0    ipratropium (ATROVENT) 0 06 % nasal spray, 2 sprays into each nostril 3 (three) times a day, Disp: 15 mL, Rfl: 0    levothyroxine (SYNTHROID) 25 mcg tablet, Take 1 tablet by mouth every morning  , Disp: , Rfl:     montelukast (SINGULAIR) 10 mg tablet, take 1 tablet by mouth at bedtime, Disp: 30 tablet, Rfl: 5    olopatadine HCl (PATADAY) 0 2 % opth drops, Administer 1 drop to both eyes daily, Disp: 1 drop, Rfl: 0    QVAR REDIHALER 80 MCG/ACT inhaler, Inhale 2 puffs 2 (two) times a day, Disp: 1 Inhaler, Rfl: 5    rosuvastatin (CRESTOR) 20 MG tablet, Take 20 mg by mouth daily at bedtime  , Disp: , Rfl:     venlafaxine (EFFEXOR-XR) 75 mg 24 hr capsule, Take 75 mg by mouth every morning  , Disp: , Rfl:     VENTOLIN  (90 Base) MCG/ACT inhaler, inhale 2 puffs by mouth every 4 hours if needed for wheezing, Disp: 18 g, Rfl: 1    zoledronic acid (RECLAST) 5 mg/100 mL IV infusion (premix), Infuse 5 mg into a venous catheter once, Disp: , Rfl:     Current Allergies     Allergies as of 02/04/2019    (No Known Allergies)            The following portions of the patient's history were reviewed and updated as appropriate: allergies, current medications, past family history, past medical history, past social history, past surgical history and problem list      Past Medical History:   Diagnosis Date    Anemia     Anxiety     Arthritis     Asthma     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    Chronic allergic rhinitis 10/13/2015    Chronic sinusitis 12/14/2015    Concussion     Depression     Frequent headaches     GERD (gastroesophageal reflux disease)     Hyperlipidemia     Irritable bowel syndrome     Muscular dystrophy     braces b/l knees to feet    Neuropathy     b/l legs and feet    Osteoporosis     Thyroid nodule 10/16/2015    Upper back pain     between shoulder blades occasionally, lower back kpain    Urinary incontinence     Vitamin D deficiency     Wears dentures     upper and lower       Past Surgical History:   Procedure Laterality Date    CARPAL TUNNEL RELEASE Bilateral     COLONOSCOPY      HYSTERECTOMY      NECK SURGERY      OOPHORECTOMY      WRIST SURGERY Right     pins       Family History   Problem Relation Age of Onset    Heart disease Mother     Lung disease Mother     Heart disease Father     COPD Sister     Cancer Sister     Asthma Family  Heart disease Family     Diabetes Family     Hypertension Family     Mental illness Family          Medications have been verified  Objective   /73   Pulse 92   Temp 98 9 °F (37 2 °C)   Resp 18   Ht 5' 3" (1 6 m)   Wt 62 6 kg (138 lb)   LMP  (LMP Unknown)   SpO2 93%   BMI 24 45 kg/m²        Physical Exam     Physical Exam   Constitutional: She appears well-developed and well-nourished  No distress  HENT:   Head: Normocephalic and atraumatic  Cardiovascular: Normal rate, regular rhythm, normal heart sounds and intact distal pulses  Pulmonary/Chest: Effort normal and breath sounds normal  No respiratory distress  She has no wheezes  She has no rales  Musculoskeletal:        Left wrist: She exhibits decreased range of motion, tenderness, bony tenderness (The radial aspect of wrist), swelling and deformity (Significant swelling over radial aspect of wrist)  She exhibits no laceration  Left hand: She exhibits no tenderness, no bony tenderness, normal capillary refill, no deformity and no swelling  Normal sensation noted  Normal strength noted  Skin: Skin is warm  No rash noted  She is not diaphoretic  Nursing note and vitals reviewed

## 2019-02-04 NOTE — TELEPHONE ENCOUNTER
Patient called in today to schedule an appt to be seen with the hand Dr for a possible fx  The results were not yet up so there was not why to see what kind of fx the patient had  I scheduled patient with Bruno Cartwright so that she could be seen so we do not delay care  Patient john said that the patient would call back on her own to schedule an appt because she wants to go to [facility]  Dr You Griffith did not have any appt time slot available

## 2019-02-04 NOTE — PATIENT INSTRUCTIONS
Rest, ice, elevate the affected limb  Take over-the-counter pain medication for symptom relief  Follow up with Orthopedics this week  Call Sanjay Sal to schedule an appointment: [phone]  Go to ER if new or worsening symptoms occur  Arm Fracture in Adults   WHAT YOU NEED TO KNOW:   An arm fracture is a crack or break in one or more of the bones in your arm  An arm fracture may be caused by a fall onto an outstretched hand  It may also be caused by trauma from a car accident or a sports injury  Osteoporosis (brittle bones) can increase your risk for a fracture  DISCHARGE INSTRUCTIONS:   Return to the emergency department if:   · The pain in your injured arm does not get better or gets worse, even after you rest and take medicine  · Your injured arm, hand, or fingers feel numb  · Your arm is swollen, red, and feels warm  · Your skin over the arm fracture is swollen, cold, or pale  · You cannot move your arm, hand, or fingers  Contact your healthcare provider if:   · You have a fever  · Your brace or splint becomes wet, damaged, or comes off  · You have questions or concerns about your injury, treatment, or care  Medicines:   · NSAIDs , such as ibuprofen, help decrease swelling and pain  This medicine is available with or without a doctor's order  NSAIDs can cause stomach bleeding or kidney problems in certain people  If you take blood thinner medicine, always ask your healthcare provider if NSAIDs are safe for you  Always read the medicine label and follow directions  · Acetaminophen  decreases pain  It is available without a doctor's order  Ask how much to take and how often to take it  Follow directions  Acetaminophen can cause liver damage if not taken correctly  · Prescription pain medicine  may be given  Ask how to take this medicine safely  · Take your medicine as directed    Contact your healthcare provider if you think your medicine is not helping or if you have side effects  Tell him or her if you are allergic to any medicine  Keep a list of the medicines, vitamins, and herbs you take  Include the amounts, and when and why you take them  Bring the list or the pill bottles to follow-up visits  Carry your medicine list with you in case of an emergency  Follow up with your healthcare provider within 1 week: You may need to see a bone specialist within 3 to 4 days if you need surgery or further treatment for your arm fracture  Write down your questions so you remember to ask them during your visits  Rest:  You should rest your arm as much as possible  Ask your healthcare provider when you can put pressure or weight on your arm  Also ask when you can return to sports or vigorous exercises  Ice:  Apply ice on your arm for 15 to 20 minutes every hour or as directed  Use an ice pack, or put crushed ice in a plastic bag  Cover it with a towel  Ice helps prevent tissue damage and decreases swelling and pain  Elevate:  Elevate your arm above the level of your heart as often as you can  This will help decrease swelling and pain  Prop your arm on pillows or blankets to keep it elevated comfortably  Care for your cast or splint:  Ask your healthcare provider when it is okay to bathe  Do not get your cast or splint wet  Before you take a bath or shower, cover your cast or splint with a plastic bag  Tape the bag to your skin to help keep water out  Hold your arm away from the water in case the bag leaks  · Check the skin around your cast or splint each day for any redness or open skin  · Do not use a sharp or pointed object to scratch your skin under the cast or splint  Physical therapy:  A physical therapist teaches you exercises to help improve movement and strength, and to decrease pain  © 2017 [employer] Information is for End User's use only and may not be sold, redistributed or otherwise used for commercial purposes   All illustrations and images included in [facility] are the copyrighted property of [employer] , [employer]  or Ryan Torres  The above information is an  only  It is not intended as medical advice for individual conditions or treatments  Talk to your doctor, nurse or pharmacist before following any medical regimen to see if it is safe and effective for you

## 2019-02-05 PROBLEM — S52.602A CLOSED FRACTURE DISTAL RADIUS AND ULNA, LEFT, INITIAL ENCOUNTER: Status: ACTIVE | Noted: 2019-01-01

## 2019-02-05 PROBLEM — S52.502A CLOSED FRACTURE DISTAL RADIUS AND ULNA, LEFT, INITIAL ENCOUNTER: Status: ACTIVE | Noted: 2019-01-01

## 2019-02-05 PROBLEM — S52.602A CLOSED FRACTURE OF LEFT DISTAL RADIUS AND ULNA: Status: ACTIVE | Noted: 2019-01-01

## 2019-02-05 PROBLEM — S52.502A CLOSED FRACTURE OF LEFT DISTAL RADIUS AND ULNA: Status: ACTIVE | Noted: 2019-01-01

## 2019-02-05 NOTE — H&P (VIEW-ONLY)
Orthopaedic Surgery - Office Note  Brayan Friday (88 y o  female)   : 1950   MRN: [id number]  Encounter Date: 2019    Chief Complaint   Patient presents with    Left Wrist - Pain       Assessment / Plan  Left wrist distal radius/ulnar styloid fracture sustained 2019    · After discussion of treatment options including continued splinting/casting versus surgical ORIF  The patient agreed that the best approach at this time given her history of failed conservative treatment of a right wrist fracture, would be ORIF of the left wrist  Consents were signed in the office at this time and surgery will be planned in the near future  · Continue with sugar-tong splint at this time  · Continue with nonweightbearing on the left wrist at this time  · Continue with ice and analgesics as needed  Return for Follow-up 10 days postoperatively  History of Present Illness  Brayan Friday is a 70 y o  female who presents for evaluation of left wrist fracture following fall yesterday [facility] [email or web address] [facility]  Following her fall she went to urgent care where she was seen to have at distal radius/ulnar styloid fracture  The placed her in a sugar-tong splint at urgent care and had her follow up with us today  The patient is right handed and denies any previous injuries to her left wrist or hand  She does have a history of a right wrist fracture which she states took 9 months to heal   She was in 3 different cast and had to use a bone stimulator at 1 point to get it to heal   At this time she is taking Tylenol for her wrist pain which seems to be controlling it sufficiently  Review of Systems  Pertinent items are noted in HPI  All other systems were reviewed and are negative  Physical Exam  /98 Comment: done manually  Pulse 80   Ht 5' 2" (1 575 m)   Wt 64 1 kg (141 lb 6 4 oz)   LMP  (LMP Unknown)   BMI 25 86 kg/m²   Cons: Appears well  No apparent distress  Psych: Alert  Oriented x3  Mood and affect normal   Eyes: PERRLA, EOMI  Resp: Normal effort  No audible wheezing or stridor  CV: Palpable pulse  No discernable arrhythmia  No LE edema  Lymph:  No palpable cervical, axillary, or inguinal lymphadenopathy  Skin: Warm  No palpable masses  No visible lesions  Neuro: Normal muscle tone  Normal and symmetric DTR's  Left Hand & Wrist Exam  Alignment:  Normal shoulder posture  Elbow is bent to 90° and sugar-tong splint  Inspection:  No swelling  No erythema  No ecchymosis  Patient currently and sugar-tong splint or unable to visualize the wrist at this time  Sugar-tong splint seem in appropriate position at this time  Palpation:  Moderate as expected tenderness at Area generalized around the left wrist   ROM:  Not tested  The patient was able to wiggle her fingers and denied any numbness or tingling distally  Strength:  Not tested  Stability:  Not tested  Tests:  No pertinent positive or negative tests  Neurovascular:  Sensation intact in Ax/R/M/U nerve distributions  Sensation intact in all digital nerve distributions  Fingers warm and perfused  Studies Reviewed  I have personally reviewed pertinent films in PACS  XR of left wrist - From 02/04/2019 show dorsal angulation of fracture site of the radius with small avulsion of the ulnar styloid    Procedures  No procedures today  Medical, Surgical, Family, and Social History  The patient's medical history, family history, and social history, were reviewed and updated as appropriate      Past Medical History:   Diagnosis Date    Anemia     Anxiety     Arthritis     Asthma     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    Chronic allergic rhinitis 10/13/2015    Chronic sinusitis 12/14/2015    Concussion     Depression     Frequent headaches     GERD (gastroesophageal reflux disease)     Hyperlipidemia     Irritable bowel syndrome     Muscular dystrophy     braces b/l knees to feet    Neuropathy     b/l legs and feet    Osteoporosis     Thyroid nodule 10/16/2015    Upper back pain     between shoulder blades occasionally, lower back kpain    Urinary incontinence     Vitamin D deficiency     Wears dentures     upper and lower       Past Surgical History:   Procedure Laterality Date    CARPAL TUNNEL RELEASE Bilateral     COLONOSCOPY      HYSTERECTOMY      NECK SURGERY      OOPHORECTOMY      WRIST SURGERY Right     pins       Family History   Problem Relation Age of Onset    Heart disease Mother     Lung disease Mother     Heart disease Father    Huma Bosch COPD Sister     Cancer Sister     Asthma Family     Heart disease Family     Diabetes Family     Hypertension Family     Mental illness Family        Social History     Occupational History    Not on file       Social History Main Topics    Smoking status: Never Smoker    Smokeless tobacco: Never Used    Alcohol use No    Drug use: No    Sexual activity: Not on file       No Known Allergies      Current Outpatient Prescriptions:     acetaminophen (TYLENOL) 325 mg tablet, Take 650 mg by mouth, Disp: , Rfl:     ALPRAZolam (XANAX) 0 25 mg tablet, Take 0 25 mg by mouth 3 (three) times a day as needed for anxiety, Disp: , Rfl: 0    aspirin 81 MG tablet, Take 81 mg by mouth daily  , Disp: , Rfl:     azelastine (ASTELIN) 0 1 % nasal spray, 1-2 sprays into each nostril 2 (two) times a day, Disp: 30 mL, Rfl: 0    carBAMazepine (TEGretol) 200 mg tablet, Take 200 mg by mouth daily at bedtime  , Disp: , Rfl: 0    carBAMazepine (TEGretol) 200 mg tablet, Take 100 mg by mouth daily in the early morning, Disp: , Rfl:     cholecalciferol (VITAMIN D3) 1,000 units tablet, Take 1,000 Units by mouth every morning, Disp: , Rfl:     docusate sodium (COLACE) 100 mg capsule, Take 100 mg by mouth as needed  , Disp: , Rfl:     EPINEPHrine (EPIPEN 2-ANALIA) 0 3 mg/0 3 mL SOAJ, Inject as directed, Disp: , Rfl:     Ferrous Sulfate (IRON) 325 (65 Fe) MG TABS, Take 1 tablet by mouth 2 (two) times a day, Disp: , Rfl:     fluticasone (FLONASE) 50 mcg/act nasal spray, 2 sprays into each nostril daily, Disp: 16 g, Rfl: 0    ipratropium (ATROVENT) 0 06 % nasal spray, 2 sprays into each nostril 3 (three) times a day, Disp: 15 mL, Rfl: 0    levothyroxine (SYNTHROID) 25 mcg tablet, Take 1 tablet by mouth every morning  , Disp: , Rfl:     montelukast (SINGULAIR) 10 mg tablet, take 1 tablet by mouth at bedtime, Disp: 30 tablet, Rfl: 5    olopatadine HCl (PATADAY) 0 2 % opth drops, Administer 1 drop to both eyes daily, Disp: 1 drop, Rfl: 0    QVAR REDIHALER 80 MCG/ACT inhaler, Inhale 2 puffs 2 (two) times a day, Disp: 1 Inhaler, Rfl: 5    rosuvastatin (CRESTOR) 20 MG tablet, Take 20 mg by mouth daily at bedtime  , Disp: , Rfl:     venlafaxine (EFFEXOR-XR) 75 mg 24 hr capsule, Take 75 mg by mouth every morning  , Disp: , Rfl:     VENTOLIN  (90 Base) MCG/ACT inhaler, inhale 2 puffs by mouth every 4 hours if needed for wheezing, Disp: 18 g, Rfl: 1    zoledronic acid (RECLAST) 5 mg/100 mL IV infusion (premix), Infuse 5 mg into a venous catheter once, Disp: , Rfl:       Jamey Rondon PA-C    Scribe Attestation    I,:    am acting as a scribe while in the presence of the attending physician :        I,:    personally performed the services described in this documentation    as scribed in my presence :

## 2019-02-05 NOTE — PROGRESS NOTES
Orthopaedic Surgery - Office Note  Brayan Friday (89 y o  female)   : 1950   MRN: [id number]  Encounter Date: 2019    Chief Complaint   Patient presents with    Left Wrist - Pain       Assessment / Plan  Left wrist distal radius/ulnar styloid fracture sustained 2019    · After discussion of treatment options including continued splinting/casting versus surgical ORIF  The patient agreed that the best approach at this time given her history of failed conservative treatment of a right wrist fracture, would be ORIF of the left wrist  Consents were signed in the office at this time and surgery will be planned in the near future  · Continue with sugar-tong splint at this time  · Continue with nonweightbearing on the left wrist at this time  · Continue with ice and analgesics as needed  Return for Follow-up 10 days postoperatively  History of Present Illness  Brayan Friday is a 70 y o  female who presents for evaluation of left wrist fracture following fall yesterday while at the [employer] [facility]  Following her fall she went to urgent care where she was seen to have at distal radius/ulnar styloid fracture  The placed her in a sugar-tong splint at urgent care and had her follow up with us today  The patient is right handed and denies any previous injuries to her left wrist or hand  She does have a history of a right wrist fracture which she states took 9 months to heal   She was in 3 different cast and had to use a bone stimulator at 1 point to get it to heal   At this time she is taking Tylenol for her wrist pain which seems to be controlling it sufficiently  Review of Systems  Pertinent items are noted in HPI  All other systems were reviewed and are negative  Physical Exam  /98 Comment: done manually  Pulse 80   Ht 5' 2" (1 575 m)   Wt 64 1 kg (141 lb 6 4 oz)   LMP  (LMP Unknown)   BMI 25 86 kg/m²   Cons: Appears well  No apparent distress  Psych: Alert  Oriented x3  Mood and affect normal   Eyes: PERRLA, EOMI  Resp: Normal effort  No audible wheezing or stridor  CV: Palpable pulse  No discernable arrhythmia  No LE edema  Lymph:  No palpable cervical, axillary, or inguinal lymphadenopathy  Skin: Warm  No palpable masses  No visible lesions  Neuro: Normal muscle tone  Normal and symmetric DTR's  Left Hand & Wrist Exam  Alignment:  Normal shoulder posture  Elbow is bent to 90° and sugar-tong splint  Inspection:  No swelling  No erythema  No ecchymosis  Patient currently and sugar-tong splint or unable to visualize the wrist at this time  Sugar-tong splint seem in appropriate position at this time  Palpation:  Moderate as expected tenderness at Area generalized around the left wrist   ROM:  Not tested  The patient was able to wiggle her fingers and denied any numbness or tingling distally  Strength:  Not tested  Stability:  Not tested  Tests:  No pertinent positive or negative tests  Neurovascular:  Sensation intact in Ax/R/M/U nerve distributions  Sensation intact in all digital nerve distributions  Fingers warm and perfused  Studies Reviewed  I have personally reviewed pertinent films in PACS  XR of left wrist - From 02/04/2019 show dorsal angulation of fracture site of the radius with small avulsion of the ulnar styloid    Procedures  No procedures today  Medical, Surgical, Family, and Social History  The patient's medical history, family history, and social history, were reviewed and updated as appropriate      Past Medical History:   Diagnosis Date    Anemia     Anxiety     Arthritis     Asthma     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    Chronic allergic rhinitis 10/13/2015    Chronic sinusitis 12/14/2015    Concussion     Depression     Frequent headaches     GERD (gastroesophageal reflux disease)     Hyperlipidemia     Irritable bowel syndrome     Muscular dystrophy     braces b/l knees to feet    Neuropathy     b/l legs and feet    Osteoporosis     Thyroid nodule 10/16/2015    Upper back pain     between shoulder blades occasionally, lower back kpain    Urinary incontinence     Vitamin D deficiency     Wears dentures     upper and lower       Past Surgical History:   Procedure Laterality Date    CARPAL TUNNEL RELEASE Bilateral     COLONOSCOPY      HYSTERECTOMY      NECK SURGERY      OOPHORECTOMY      WRIST SURGERY Right     pins       Family History   Problem Relation Age of Onset    Heart disease Mother     Lung disease Mother     Heart disease Father    Elizabeth Jack COPD Sister     Cancer Sister     Asthma Family     Heart disease Family     Diabetes Family     Hypertension Family     Mental illness Family        Social History     Occupational History    Not on file       Social History Main Topics    Smoking status: Never Smoker    Smokeless tobacco: Never Used    Alcohol use No    Drug use: No    Sexual activity: Not on file       No Known Allergies      Current Outpatient Prescriptions:     acetaminophen (TYLENOL) 325 mg tablet, Take 650 mg by mouth, Disp: , Rfl:     ALPRAZolam (XANAX) 0 25 mg tablet, Take 0 25 mg by mouth 3 (three) times a day as needed for anxiety, Disp: , Rfl: 0    aspirin 81 MG tablet, Take 81 mg by mouth daily  , Disp: , Rfl:     azelastine (ASTELIN) 0 1 % nasal spray, 1-2 sprays into each nostril 2 (two) times a day, Disp: 30 mL, Rfl: 0    carBAMazepine (TEGretol) 200 mg tablet, Take 200 mg by mouth daily at bedtime  , Disp: , Rfl: 0    carBAMazepine (TEGretol) 200 mg tablet, Take 100 mg by mouth daily in the early morning, Disp: , Rfl:     cholecalciferol (VITAMIN D3) 1,000 units tablet, Take 1,000 Units by mouth every morning, Disp: , Rfl:     docusate sodium (COLACE) 100 mg capsule, Take 100 mg by mouth as needed  , Disp: , Rfl:     EPINEPHrine (EPIPEN 2-ANALIA) 0 3 mg/0 3 mL SOAJ, Inject as directed, Disp: , Rfl:     Ferrous Sulfate (IRON) 325 (65 Fe) MG TABS, Take 1 tablet by mouth 2 (two) times a day, Disp: , Rfl:     fluticasone (FLONASE) 50 mcg/act nasal spray, 2 sprays into each nostril daily, Disp: 16 g, Rfl: 0    ipratropium (ATROVENT) 0 06 % nasal spray, 2 sprays into each nostril 3 (three) times a day, Disp: 15 mL, Rfl: 0    levothyroxine (SYNTHROID) 25 mcg tablet, Take 1 tablet by mouth every morning  , Disp: , Rfl:     montelukast (SINGULAIR) 10 mg tablet, take 1 tablet by mouth at bedtime, Disp: 30 tablet, Rfl: 5    olopatadine HCl (PATADAY) 0 2 % opth drops, Administer 1 drop to both eyes daily, Disp: 1 drop, Rfl: 0    QVAR REDIHALER 80 MCG/ACT inhaler, Inhale 2 puffs 2 (two) times a day, Disp: 1 Inhaler, Rfl: 5    rosuvastatin (CRESTOR) 20 MG tablet, Take 20 mg by mouth daily at bedtime  , Disp: , Rfl:     venlafaxine (EFFEXOR-XR) 75 mg 24 hr capsule, Take 75 mg by mouth every morning  , Disp: , Rfl:     VENTOLIN  (90 Base) MCG/ACT inhaler, inhale 2 puffs by mouth every 4 hours if needed for wheezing, Disp: 18 g, Rfl: 1    zoledronic acid (RECLAST) 5 mg/100 mL IV infusion (premix), Infuse 5 mg into a venous catheter once, Disp: , Rfl:       Yasmine Gomes PA-C    Scribe Attestation    I,:    am acting as a scribe while in the presence of the attending physician :        I,:    personally performed the services described in this documentation    as scribed in my presence :

## 2019-02-08 NOTE — PRE-PROCEDURE INSTRUCTIONS
Pre-Surgery Instructions:   Medication Instructions    acetaminophen (TYLENOL) 325 mg tablet Patient was instructed by Physician and understands   ALPRAZolam (XANAX) 0 25 mg tablet Patient was instructed by Physician and understands   carBAMazepine (TEGretol) 200 mg tablet Patient was instructed by Physician and understands   cholecalciferol (VITAMIN D3) 1,000 units tablet Patient was instructed by Physician and understands   EPINEPHrine (EPIPEN 2-ANALIA) 0 3 mg/0 3 mL SOAJ Patient was instructed by Physician and understands   Ferrous Sulfate (IRON) 325 (65 Fe) MG TABS Patient was instructed by Physician and understands   levothyroxine (SYNTHROID) 25 mcg tablet Patient was instructed by Physician and understands   QVAR REDIHALER 80 MCG/ACT inhaler Patient was instructed by Physician and understands   rosuvastatin (CRESTOR) 20 MG tablet Patient was instructed by Physician and understands   venlafaxine (EFFEXOR-XR) 75 mg 24 hr capsule Patient was instructed by Physician and understands   VENTOLIN  (90 Base) MCG/ACT inhaler Patient was instructed by Physician and understands  Pt instructed to take effexor, levothryoxine, and xanax with a small sip of water the morning of surgery and use inhalers  Pt given [facility] preop instructions and reviewed with pt  Pt given Chlorhexidine

## 2019-02-08 NOTE — ANESTHESIA PREPROCEDURE EVALUATION
Review of Systems/Medical History  Patient summary reviewed  Chart reviewed  No history of anesthetic complications     Cardiovascular  Exercise tolerance (METS): >4,  Hyperlipidemia,    Pulmonary  Asthma , well controlled/ stable Asthma type of rescue: daily inhaler,   Comment: Pt states that her room air sat 88-low 90 on RA     GI/Hepatic    GERD well controlled,        Negative  ROS        Endo/Other  History of thyroid disease , hypothyroidism,      GYN       Hematology  Anemia ,     Musculoskeletal    Arthritis     Neurology    Headaches,   Comment: Charcot-Rashida-Tooth disease wears braces  Charcot Rashida Tooth only affects lower extremity Psychology   Anxiety, Depression ,              Physical Exam    Airway    Mallampati score: III  TM Distance: <3 FB  Neck ROM: full     Dental   upper dentures and lower dentures,     Cardiovascular  Rhythm: regular, Rate: normal,     Pulmonary  Breath sounds clear to auscultation,     Other Findings  Small mouth opening, large tongue  Anesthesia Plan  ASA Score- 3     Anesthesia Type- general and regional with ASA Monitors  Additional Monitors:   Airway Plan:     Comment: D/w pt about supraclavicular block  Pt wishes to proceed with peripheral nerve block    Plan Factors-  Patient did not smoke on day of surgery  Induction- intravenous  Postoperative Plan-     Informed Consent- Anesthetic plan and risks discussed with patient

## 2019-02-14 NOTE — OP NOTE
OPERATIVE REPORT    PATIENT NAME: Taylor Wilcox   :  1950  MRN: [id number]  Pt Location: [facility] OR ROOM 01    SURGERY DATE: 2019    SURGEON(S) and ROLE:  Primary: Priscilla Vasquez MD  Assisting: Ignacio Starks PA-C    NOTE:  The presence of a physician assistant was necessary to help with patient positioning, surgical exposure, wound retraction, wound closure, and other key portions of the procedure  No qualified resident was available for this case  PREOPERATIVE DIAGNOSES:  Left Distal Radius Fracture    POSTOPERATIVE DIAGNOSES:  Same as preoperative diagnoses    PROCEDURES:  ORIF Left Distal Radius Fracture      ANESTHESIA TYPE:  General LMA and Supraclavicular block    ANESTHESIA STAFF:   Anesthesiologist: Manfred Ch DO  CRNA: Lele Penaloza CRNA; Veronica Sanchez CRNA    ESTIMATED BLOOD LOSS:  10 mL    TOURNIQUET TIME:  Not used    PERIOPERATIVE ANTIBIOTICS:  cefazolin, 1 gram    IMPLANTS:  [employer] DVR 3-hole narrow plate    Implant Name Type Inv  Item Serial No   Lot No  LRB No  Used Action   DVR ANATOMIC NARW SHORT LEFT DVRANSL - [id number]  DVR ANATOMIC NARW SHORT LEFT DVRANSL  [employer]  Left 1 Implanted   PEG SMOOTH 2 X 18MM - [id number]  PEG SMOOTH 2 X 18MM  [employer]  Left 2 Implanted   PEG SMOOTH 2 X 20MM - [id number]  PEG SMOOTH 2 X 20MM  [employer]  Left 4 Implanted   SCREW DEPUY CORTIC [id number] - [id number]  SCREW DEPUY CORTIC [id number]  [employer]  Left 2 Implanted       SPECIMENS:  * No specimens in log *    DRAINS:  None      OPERATIVE INDICATIONS:  The patient is a 78 y o  female with left wrist pain and a displaced distal radius fracture  Surgical treatment was indicated due to instability and displacement  After a thorough discussion of the potential risks, benefits, and alternative treatments, the patient agreed to proceed with surgery    The patient understands that the risks of surgery include, but are not limited to: nonunion, malunion, loss of fixation, infection, neurovascular injury, wound healing complications, venous thromboembolism, persistent pain, stiffness, instability, recurrence of symptoms, potential need for additional surgeries, and loss of limb or life  Oral and written consent for surgery was obtained from the patient preoperatively  PROCEDURE AND TECHNIQUE:  On the day of surgery, the patient was identified in the preoperative holding area  The operative site was marked by the surgeon  The patient was taken into the operating room  A time-out was conducted to confirm the patient's identity, the operative site, and the proposed procedure  The patient was anesthetized, and perioperative antibiotics were administered  The patient was positioned supine on the OR table  All bony prominences were padded  A tourniquet cuff was applied to the operative extremity, but it was not used during the procedure  The operative site was prepped and draped using standard sterile technique  A volar approach was made to the wrist   The FCR tendon was mobilized and retracted radially  The floor of the FCR sheath was incised, and the FPL was retracted ulnarly  The pronator quadratus was incised along the distal and radial borders of the radius, and elevated subperiosteally  The fracture was impacted, angulated and extra-articular  The fracture site was debrided and irrigated to remove hematoma and soft callus  The fracture was reduced manually and held provisionally with a  K-wire placed through the radial styloid  Appropriate fracture alignment was confirmed with fluoroscopy  A 3-hole narrow DVR plate was positioned over the fracture site and provisionally secured  Appropriate hardware position was confirmed with fluoroscopy  The plate was secured distally first with smooth locking pegs, then secured proximally with two 3 5 mm bicortical nonlocking screws  All screws achieved excellent purchase  The distal radioulnar joint was stable    Final fluoroscopic images confirmed appropriate fracture alignment and hardware position  The wound was irrigated with sterile saline  The pronator quadratus  was not repaired  The skin was closed with 3-0 vicryl and 3-0 nylon sutures  A sterile dressing was applied  The drapes were removed and the patient was given a volar wrist splint and arm sling  The patient was awakened from anesthesia and taken to the PACU in stable condition        COMPLICATIONS:  None    PATIENT DISPOSITION:  PACU       SIGNATURE:  Mal Starks MD  DATE:  February 14, 2019  TIME:  6:26 PM

## 2019-02-14 NOTE — ANESTHESIA POSTPROCEDURE EVALUATION
Post-Op Assessment Note    CV Status:  Stable    Pain management: adequate     Mental Status:  Alert and awake   Hydration Status:  Euvolemic   PONV Controlled:  Controlled   Airway Patency:  Patent   Post Op Vitals Reviewed:  Yes              /78 (02/14/19 1409)    Temp      Pulse 92 (02/14/19 1409)   Resp 16 (02/14/19 1409)    SpO2 94 % (02/14/19 1409)

## 2019-02-15 PROBLEM — R09.02 HYPOXIA: Status: ACTIVE | Noted: 2019-01-01

## 2019-02-15 NOTE — CONSULTS
Inpatient Medical Consultation - [phone] [facility] Internal Medicine    Patient Information: Clyde Avalos 78 y o  female MRN: [id number]  Unit/Bed#: E5 -01 Encounter: [id number]  PCP: Goldie Coffman DO  Date of Admission:  2/14/2019  Date of Consultation: 02/15/19  Requesting Physician: Lexx Chávez MD    Reason For Consultation:   Postop hypoxia    Assessment/Plan:    Hospital Problem List:     Principal Problem:    Hypoxia  Active Problems: Moderate persistent asthma without complication    Muscular dystrophy    Closed fracture distal radius and ulna, left, initial encounter      Plan:  · Hypoxia noted in the postop setting after fracture repair of left her left wrist presently on 1 L of nasal cannula and last pulse ox pulled saturation 92%/patient has underlying muscular dystrophy and I think combination of her sedation from her surgery and restrictive lung disease for muscular dystrophy contributes to probable chronic hypoxia and would favor a assessment with home O2 E screen presently she is not agreeable to home oxygen therapy is basically sedentary from her MD  · Moderate persistent asthma may also be contributing but really not much bronchospasm on my exam only on end expiration continue her present inhaler usage at home with Ventolin and QVAR  · Closed fracture of distal radius and ulna defer to Orthopedics for management  · Suspect she can be discharged later today and would not hold up at discharge solely based on borderline hypoxia which I believe she has chronically  · Gastroesophageal reflux by history with hiatal hernia    VTE Prophylaxis: Reason for no pharmacologic prophylaxis As per orthopedic service  / sequential compression device     Recommendations for Discharge:  · Can discharge after home O2 screen but would not hold up discharge based on results    Counseling / Coordination of Care Time: 45 minutes    Greater than 50% of total time spent on patient counseling and coordination of care     Collaboration of Care: Were Recommendations Directly Discussed with Primary Treatment Team? - Yes     History of Present Illness:    Le Machuca is a 78 y o  female who is originally admitted to the orthopedic service service on 2/14/2019 due to   We are consulted for postop hypoxia  Patient underwent repair of a distal radius and ulnar fracture of her left arm after a fall on the ice and was noted to have postop hypoxia as low as 79% on 3 L nasal flow has since improved overnight and presently on 1 L and comfortable she had actually refused an EKG for further evaluation believing it was not her heart she does have underlying muscular dystrophy for years and is basically sedentary uses braces to walk she lives with her son at her place of residence she does not use steps  Review of Systems:    Review of Systems   Constitutional: Negative  HENT: Negative  Eyes: Negative  Respiratory: Positive for chest tightness and shortness of breath  Cardiovascular: Negative  Gastrointestinal: Negative  Endocrine: Negative  Genitourinary: Negative  Musculoskeletal: Negative  Skin: Negative  Allergic/Immunologic: Negative  Neurological: Positive for weakness  Hematological: Negative  Psychiatric/Behavioral: The patient is nervous/anxious          Past Medical and Surgical History:     Past Medical History:   Diagnosis Date    Anemia     Anxiety     Arthritis     Asthma     Braces as ambulation aid     both legs    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    Chronic allergic rhinitis 10/13/2015    Chronic sinusitis 12/14/2015    Concussion     Depression     Environmental and seasonal allergies     Falls     Frequent headaches     Full dentures     GERD (gastroesophageal reflux disease)     occasional    Hyperlipidemia     Irritable bowel syndrome     Muscular dystrophy     braces b/l knees to feet    Neuropathy     b/l legs and feet    Osteoporosis     Thyroid nodule 10/16/2015    Upper back pain     between shoulder blades occasionally, lower back kpain    Urinary incontinence     Vitamin D deficiency     Wears dentures     upper and lower       Past Surgical History:   Procedure Laterality Date    CARPAL TUNNEL RELEASE Bilateral     COLONOSCOPY      ESOPHAGOGASTRODUODENOSCOPY      HYSTERECTOMY      KNEE ARTHROSCOPY Right     OOPHORECTOMY      HI OPEN RDL SHAFT FX OPEN RAD/ULN JT DISLOCATE Left 2/14/2019    Procedure: ORIF GEORGES / Ira Sharma;  Surgeon: Priscilla Vasquez MD;  Location: [facility];  Service: Orthopedics    WRIST SURGERY Right     pins       Meds/Allergies:    all medications and allergies reviewed    Allergies: No Known Allergies    Social History:     Marital Status:     Substance Use History:   Social History     Substance and Sexual Activity   Alcohol Use No     Social History     Tobacco Use   Smoking Status Never Smoker   Smokeless Tobacco Never Used     Social History     Substance and Sexual Activity   Drug Use No       Family History:    non-contributory    Physical Exam:     Vitals:   Blood Pressure: 162/77 (02/15/19 0734)  Pulse: 82 (02/15/19 0734)  Temperature: 97 6 °F (36 4 °C) (02/15/19 0734)  Temp Source: Temporal (02/15/19 0734)  Respirations: 18 (02/15/19 0734)  Height: 5' 2" (157 5 cm) (02/14/19 0930)  Weight - Scale: 64 kg (141 lb) (02/14/19 0930)  SpO2: 93 % (02/15/19 0734)       General appearance: alert, cachectic, appears stated age and cooperative  Head: Normocephalic, without obvious abnormality, atraumatic  Lungs: diminished breath sounds and wheezes  Heart: regular rate and rhythm  Abdomen: soft, non-tender; bowel sounds normal; no masses,  no organomegaly  Back: negative  Extremities: extremities normal, atraumatic, no cyanosis or edema  Neurologic: Grossly normal          Additional Data:     Lab Results:  I Have Reviewed All Lab Data Below:    Results from last 7 days   Lab Units 02/08/19  1431   WBC Thousand/uL 6  74   HEMOGLOBIN g/dL 12 5   HEMATOCRIT % 40 6   PLATELETS Thousands/uL 291   NEUTROS PCT % 54   LYMPHS PCT % 36   MONOS PCT % 8   EOS PCT % 1     Results from last 7 days   Lab Units 02/08/19  1431   POTASSIUM mmol/L 3 6   CHLORIDE mmol/L 101   CO2 mmol/L 35*   BUN mg/dL 14   CREATININE mg/dL 0 53*   CALCIUM mg/dL 9 4           * Additional Pertinent Lab Tests Reviewed: Matt 65 Admission Reviewed    Imaging: I have personally reviewed pertinent reports  Xr Chest Portable    Result Date: 2/14/2019  Narrative: CHEST INDICATION:   Hypoxia  COMPARISON:  12/13/2005 EXAM PERFORMED/VIEWS:  XR CHEST PORTABLE FINDINGS: Cardiomediastinal silhouette appears unremarkable  The lungs are low in volume with left basilar atelectasis  No pneumothorax or pleural effusion  Osseous structures appear within normal limits for patient age  Impression: Low lung volumes with left basilar atelectasis  Workstation performed: [id number]     Xr Wrist 3+ Vw Left    Result Date: 2/4/2019  Narrative: LEFT WRIST INDICATION:   W19  XXXA: Unspecified fall, initial encounter  COMPARISON:  None VIEWS:  XR WRIST 3+ VW LEFT FINDINGS: Fracture of distal radius with dorsal angulation and avulsion fracture of ulnar styloid process  Mild degenerative changes of carpal bones  No lytic or blastic lesions are seen  There is surrounding soft tissue swelling  Impression: Fracture of distal radius with dorsal angulation and avulsion fracture of ulnar styloid process  Findings concur with the preliminary report by the referring clinician already in PACS and/or our electronic record EPIC  Workstation performed: [id number]     Xr Hand 3+ Vw Left    Result Date: 2/4/2019  Narrative: LEFT HAND INDICATION:   W19  XXXA: Unspecified fall, initial encounter   COMPARISON:  None VIEWS:  XR HAND 3+ VW LEFT For the purposes of institution wide universal language the following terms will apply: (thumb=1st digit/finger, index finger=2nd digit/finger, long finger=3rd digit/finger, ring=4th digit/finger and small finger=5th digit/finger) FINDINGS: There is distal radius fracture with dorsal angulation  Also avulsion fracture of the ulnar styloid process  Osteoarthritis of the DIP and PIP joints  No lytic or blastic lesions are seen  There is soft tissue swelling in the distal forearm and carpal region  Impression: Distal radius fracture with dorsal angulation and avulsion fracture of ulnar styloid process  Findings concur with the preliminary report by the referring clinician already in PACS and/or our electronic record EPIC   Workstation performed: [id number]         ** Please Note: Dragon 360 Dictation speech to text software may have been used in the creation of this document **

## 2019-02-15 NOTE — NURSING NOTE
Pt given room air trial, removed from 3L O2 for 20 minutes, pulse ox dropped to 79% while pt sitting  Pt inquired why this happens, I educated her that it is possible that below 90% is her baseline at home as well as the possibility that it is related to her muscular dystrophy  Pt pointed out mild substernal chest pain of a muscular, aching not crushing quality that radiates to the back  I informed her that an EKG is indicated  Pt refused EKG at this time  Educated in purpose of EKG  Pt states "I don't think it's anything heart related   I'm okay "

## 2019-02-15 NOTE — PLAN OF CARE
Problem: Potential for Falls  Goal: Patient will remain free of falls  Description  INTERVENTIONS:  - Assess patient frequently for physical needs  -  Identify cognitive and physical deficits and behaviors that affect risk of falls  -  [place] fall precautions as indicated by assessment   - Educate patient/family on patient safety including physical limitations  - Instruct patient to call for assistance with activity based on assessment  - Modify environment to reduce risk of injury  - Consider OT/PT consult to assist with strengthening/mobility  Outcome: Progressing     Problem: Prexisting or High Potential for Compromised Skin Integrity  Goal: Skin integrity is maintained or improved  Description  INTERVENTIONS:  - Identify patients at risk for skin breakdown  - Assess and monitor skin integrity  - Assess and monitor nutrition and hydration status  - Monitor labs (i e  albumin)  - Assess for incontinence   - Turn and reposition patient  - Assist with mobility/ambulation  - Relieve pressure over bony prominences  - Avoid friction and shearing  - Provide appropriate hygiene as needed including keeping skin clean and dry  - Evaluate need for skin moisturizer/barrier cream  - Collaborate with interdisciplinary team (i e  Nutrition, Rehabilitation, etc )   - Patient/family teaching  Outcome: Progressing     Problem: BEHAVIOR  Goal: Pt/Family maintain appropriate behavior and adhere to behavioral management agreement, if implemented  Description  INTERVENTIONS:  - Assess the family dynamic   - Encourage verbalization of thoughts and concerns in a socially appropriate manner  - Assess patient/family's coping skills and non-compliant behavior (including use of illegal substances)    - Utilize positive, consistent limit setting strategies supporting safety of patient, staff and others  - Initiate consult with Case Management, Spiritual Care or other ancillary services as appropriate  - If a patient's/visitor's behavior jeopardizes the safety of the patient, staff, or others, refer to organization procedure     - Notify Security of behavior or suspected illegal substances which indicate the need for search of the patient and/or belongings  - Encourage participation in the decision making process about a behavioral management agreement; implement if patient meets criteria  Outcome: Progressing     Problem: PAIN - ADULT  Goal: Verbalizes/displays adequate comfort level or baseline comfort level  Description  Interventions:  - Encourage patient to monitor pain and request assistance  - Assess pain using appropriate pain scale  - Administer analgesics based on type and severity of pain and evaluate response  - Implement non-pharmacological measures as appropriate and evaluate response  - Consider cultural and social influences on pain and pain management  - Notify physician/advanced practitioner if interventions unsuccessful or patient reports new pain  Outcome: Progressing     Problem: INFECTION - ADULT  Goal: Absence or prevention of progression during hospitalization  Description  INTERVENTIONS:  - Assess and monitor for signs and symptoms of infection  - Monitor lab/diagnostic results  - Monitor all insertion sites, i e  indwelling lines, tubes, and drains  - Monitor endotracheal (as able) and nasal secretions for changes in amount and color  - [place] appropriate cooling/warming therapies per order  - Administer medications as ordered  - Instruct and encourage patient and family to use good hand hygiene technique  - Identify and instruct in appropriate isolation precautions for identified infection/condition  Outcome: Progressing  Goal: Absence of fever/infection during neutropenic period  Description  INTERVENTIONS:  - Monitor WBC  - Implement neutropenic guidelines  Outcome: Progressing     Problem: SAFETY ADULT  Goal: Maintain or return to baseline ADL function  Description  INTERVENTIONS:  -  Assess patient's ability to carry out ADLs; assess patient's baseline for ADL function and identify physical deficits which impact ability to perform ADLs (bathing, care of mouth/teeth, toileting, grooming, dressing, etc )  - Assess/evaluate cause of self-care deficits   - Assess range of motion  - Assess patient's mobility; develop plan if impaired  - Assess patient's need for assistive devices and provide as appropriate  - Encourage maximum independence but intervene and supervise when necessary  ¯ Involve family in performance of ADLs  ¯ Assess for home care needs following discharge   ¯ Request OT consult to assist with ADL evaluation and planning for discharge  ¯ Provide patient education as appropriate  Outcome: Progressing  Goal: Maintain or return mobility status to optimal level  Description  INTERVENTIONS:  - Assess patient's baseline mobility status (ambulation, transfers, stairs, etc )    - Identify cognitive and physical deficits and behaviors that affect mobility  - Identify mobility aids required to assist with transfers and/or ambulation (gait belt, sit-to-stand, lift, walker, cane, etc )  - [place] fall precautions as indicated by assessment  - Record patient progress and toleration of activity level on Mobility SBAR; progress patient to next Phase/Stage  - Instruct patient to call for assistance with activity based on assessment  - Request Rehabilitation consult to assist with strengthening/weightbearing, etc   Outcome: Progressing     Problem: DISCHARGE PLANNING  Goal: Discharge to home or other facility with appropriate resources  Description  INTERVENTIONS:  - Identify barriers to discharge w/patient and caregiver  - Arrange for needed discharge resources and transportation as appropriate  - Identify discharge learning needs (meds, wound care, etc )  - Arrange for interpretive services to assist at discharge as needed  - Refer to Case Management Department for coordinating discharge planning if the patient needs post-hospital services based on physician/advanced practitioner order or complex needs related to functional status, cognitive ability, or social support system  Outcome: Progressing     Problem: Knowledge Deficit  Goal: Patient/family/caregiver demonstrates understanding of disease process, treatment plan, medications, and discharge instructions  Description  Complete learning assessment and assess knowledge base    Interventions:  - Provide teaching at level of understanding  - Provide teaching via preferred learning methods  Outcome: Progressing

## 2019-02-15 NOTE — NURSING NOTE
Pt was admitted to floor approximately 2000  Patient had many questions which I politely answered for her  When it came time for my nursing assessment, I listened to her lungs which were diminished but not wheezy  Upon lifting the stethoscope off her chest, she had a very mild wheeze that was almost not audible  I brought that up to the patient and she told me she wanted her Ventolin inhaler  Her home med list does contain Ventolin prn for wheezing, but she is ordered Xopenex prn wheezing while on this floor  The patient told me she takes Ventolin "every night at 9pm before bed " I educated her that Ventolin is not typically a routine medication and only meant for wheezing, but while she is here Xopenex is ordered for her wheeze and recommended that if she is concerned a manifesting wheeze, that I can call respiratory to give her the Xopenex treatment which is a nebulizer  I also confirmed for her that our emergency boxes all contain Ventolin if an emergency occurred  At this time, the patient became demanding that she wanted her Ventolin, repeating that she takes it every night  At this time I re-educated with her that Manuel Bars is used to treat a wheeze the same way Ventolin is and since she is concerned with a manifesting wheeze, the we try that first before taking further measures  At this time, the patient asked if there were any side effects that she could be concerned about as she was unfamiliar with it, and I assured her that the med would not present any harm to her  During this discussion, the patient had a difficult time focusing and changed the subject multiple times, asking about her vital signs and various alarms and bells going on in the room and hallway  Each time I tried to redirect back on topic, multiple details of education needed to be re-introduced  After multiple attempts to redirect back to the respiratory treatment education, she agreed to take the Xopenex       The respiratory therapist came with the Xopenex treatment after my call, and had to leave the room with the patient refusing the Xopenex treatment  When I asked why she refused the treatment she stated "I don't want anything that's going to make me dizzy or set me back like that " I discussed with her that we just briefed on the subject of Xopenex and at this time attempted to reeducate her that it would not cause any problems and it was safe to treat a wheeze just as Ventolin, the patient again became distracted by various sounds going off including the vital signs reading on the VS machine  At this time I told her "Nciole Piedra can only discuss one issue at a time, it seems that we keep jumping to different discussions and it's creating a lot of confusion  I know you're probably nervous here in the hospital but as your nurse you need to let me dictate some of the course of communication here so we don't keep misunderstanding eachother  Once we cover what is needed, I will answer any other questions you have " At this time the patient grew irritable and angry with me, yelling that I am talking down to her and that she has the right to ask questions  I told confirmed she has the right to ask questions, but it was important for me to assess why she refused the Xopenex  At this time she told me angrily that she never refused the xopenex, and that I never even mentioned that medication at all, and she thought the respiratory therapist was bringing her Ventolin  At this time, the respiratory therapist and I reassessed her lungs and there was no wheeze  The patient was visually upset and told me "I had no problems with any nurse until I met you, you wisecracker!" At this time I apologized for how she felt and asked if she was okay and if she wanted me to obtain an order for medication for anxiety  The patient ignored me and told me to leave her alone and go take care of other patients

## 2019-02-15 NOTE — PROGRESS NOTES
Orthopaedic Surgery - Progress Note  Tawanda Valente (55 y o  female)   : 1950   MRN: [id number]  Date: 2/15/2019   Encounter: [id number]   Unit/Bed#: E5 -01    Assessment / Plan  Postop day 1 status post left wrist distal radius ORIF    · Continue with sling for comfort and splint at all times on the left wrist   · Continue with nonweightbearing on the left wrist at this time  · Continue with ice and analgesics as needed  · Patient is okay to ambulate as tolerated  · Plan for discharge home once cleared medically and there will to wean her off of the auction without issue  Subjective  Postop day 1 status post left wrist distal radius ORIF  Patient states that her pain is controlled at this time  She has been compliant with nonweightbearing on the left wrist  Patient is frustrated that she was admitted to the hospital due to her breathing issues and would like discharge home as soon as possible  Per nursing they have tried to wean the patient off of oxygen without success up to this point  Vitals  Temp:  [97 3 °F (36 3 °C)-98 5 °F (36 9 °C)] 97 6 °F (36 4 °C)  HR:  [] 82  Resp:  [13-24] 18  BP: (135-202)/(63-98) 162/77  Body mass index is 25 79 kg/m²  I/O last 24 hours: In: 2200 [I V :2200]  Out: 250 [Urine:250]    Ortho Exam - Left Upper Extremity  Left Hand & Wrist Exam  Alignment:  Normal elbow alignment and carrying angle  Normal resting hand posture  Inspection:  Mild left upper arm and hand swelling  No erythema  No ecchymosis  Splint is intact at this time  Palpation:  Mild as expected tenderness at Left hand  ROM:  Not tested  Strength:  Not tested  Stability:  Not tested  Tests:  No pertinent positive or negative tests  Neurovascular:  Sensation intact in Ax/R/M/U nerve distributions  Sensation intact in all digital nerve distributions  Fingers warm and perfused      Lab Results  (I have personally reviewed pertinent lab results )  Results from last 7 days   Lab Units 02/08/19  1431   WBC Thousand/uL 6 74   HEMOGLOBIN g/dL 12 5   HEMATOCRIT % 40 6   PLATELETS Thousands/uL 291         Results from last 7 days   Lab Units 02/08/19  1431   POTASSIUM mmol/L 3 6   CHLORIDE mmol/L 101   CO2 mmol/L 35*   BUN mg/dL 14   CREATININE mg/dL 0 53*   EGFR ml/min/1 73sq m 97   CALCIUM mg/dL 9 4               Lillard Landau, PA-C

## 2019-02-15 NOTE — DISCHARGE INSTRUCTIONS
POSTOPERATIVE INSTRUCTIONS    MEDICATIONS:  · Resume all home medications unless otherwise instructed by your surgeon  · Pain Medication:  Oxycodone 5 mg, 1-3 tablets every 3 hours as needed  · If you were given a regional anesthetic (nerve block), please begin taking the pain medication as soon as you get home, even if you have minimal or no pain  DO NOT WAIT FOR THE NERVE BLOCK TO WEAR OFF  · Possible side effects include nausea, constipation, and urinary retention  If you experience these side effects, please call our office for assistance  · Pain med refills are authorized only during office hours (8am-4pm, Mon-Fri)  · Anti-Inflammatory:  Naproxen 500 mg, 1 tablet every 12 hours for 4 weeks  · Take with food  Stop if you experience nausea, reflux, or stomach pain  · Nausea Medication:  Promethazine 12 5 mg, 1 tablet every 6 hours as needed  · Fill prescription ONLY if you expericnce severe nausea  WOUND CARE:  · Keep the dressing clean and dry  Light drainage may occur the first 2 days postop  · DO NOT REMOVE THE DRESSINGS until you are seen in our office  Cover the bandages appropriately while washing to keep them from getting wet  · Please call our office ([phone]) if you experience any of the following:  · Sudden increase in swelling, redness, or warmth at the surgical site  · Excessive incisional drainage that persists beyond the 3rd day after surgery  · Oral temperature greater than 101 degrees, not relieved with Tylenol  · Shortness of breath, chest pain, nausea, or any other concerning symptoms    SWELLING CONTROL:  · Cold Therapy:  Apply ice (20 min on, 20 min off) as often as you feel is necessary  · Elevation:  Keep your arm at or above heart level as much as possible  IMMOBILIZATION:  · DO NOT REMOVE YOUR SPLINT  Keep it clean and dry  ACTIVITY:  · DO NOT lift, carry, push, or pull anything with your arm until cleared by your surgeon    · Wrist / Finger Motion:  Move your wrist and fingers (if not immobilized in splint) through a full range of motion 20 times per hour while awake  PHYSICAL THERAPY:  · You will be given a physical therapy prescription when you are seen in the office for your postoperative appointment  FOLLOW-UP APPOINTMENT:  · 10-14 days after surgery with:    Dr Mandie Mercedes  [facility], [facility]  [address] [facility], [address] [place] [address], [facility], [address] [facility]  [phone] ([facility])  [phone] (After Hours)        Muscular Dystrophy   WHAT YOU NEED TO KNOW:   Muscular dystrophy (MD) is an inherited disease that causes weakness and loss of muscle  There are several types, such as Duchenne and Lozada muscular dystrophy, that affect muscles in different parts of your body  Muscle weakness may lead to difficulty walking  In some cases, it can also lead to difficulty eating, drinking, or breathing  DISCHARGE INSTRUCTIONS:   Call 911 for any of the following:   · You have any of the following signs of a heart attack:      ¨ Squeezing, pressure, or pain in your chest that lasts longer than 5 minutes or returns    ¨ Discomfort or pain in your back, neck, jaw, stomach, or arm     ¨ Trouble breathing    ¨ Nausea or vomiting    ¨ Lightheadedness or a sudden cold sweat, especially with chest pain or trouble breathing    · You have trouble breathing  Contact your healthcare provider if:   · You have a fever  · You have difficulty having a bowel movement  · You have more weakness than usual     · You have trouble swallowing  · You are depressed or feel you cannot cope with your MD     · You have questions or concerns about your condition or care  Medicines:   · Medicines  are given to decrease pain and inflammation, or relax your muscles  Your healthcare provider may also recommend medicine to help other medical conditions that may result from MD      · Take your medicine as directed    Contact your healthcare provider if you think your medicine is not helping or if you have side effects  Tell him or her if you are allergic to any medicine  Keep a list of the medicines, vitamins, and herbs you take  Include the amounts, and when and why you take them  Bring the list or the pill bottles to follow-up visits  Carry your medicine list with you in case of an emergency  Manage your symptoms:   · Do breathing exercises as directed  Deep breathing can help you breathe more easily  Breathe out with pursed or puckered lips  Use your diaphragm to breathe  Put one hand on your abdomen and breathe in, causing your hand to move outward or upward  This helps make more room so your lungs can take in more air  Your healthcare provider may teach you or family members how to watch for signs of breathing problems  · Learn safe ways to eat and swallow  Talk with your healthcare provider if you have trouble swallowing  He will show you safer ways to swallow and teach you which foods and liquids are safe to eat and drink  He may also recommend soft foods or thick liquids to make it easier to swallow  In some cases, you may receive nutrition through an IV or tube into your stomach  · Stay safe at home and when you walk  Use a 4-pronged cane or walker to help you keep your balance when you walk  Remove loose carpeting from the floor to reduce your risk for a fall  Use chairs with side arms and hard cushions to make it easier to get up or out of a chair  Put grab bars on the walls beside toilets and inside showers and bathtubs  These will help you get up and help prevent falls  You may want to put a shower chair inside the shower  · Ask about vaccines  The flu and pneumonia vaccines may help reduce your risk for lung infections  Ask your healthcare provider when to get these shots  · Maintain a healthy weight  A healthy weight may help prevent too much stress on your muscles  It may also decrease your risk for breathing problems   Exercise and a healthy eating plan can help you maintain a healthy weight  Ask your healthcare provider how much you should weigh  Also ask about an exercise program and eating plan that is right for you  · Exercise as directed  You will need to work with an exercise specialist to prevent injury or muscle damage  Low-impact exercise, such as swimming, can help your heart and muscles work better and decrease tiredness  Your healthcare provider may also recommend strength training, such as weightlifting  Drink liquids before, during, and after exercise to prevent dehydration  Stop exercising if you have shortness of breath  Do not exercise again if you feel weaker after exercise, or your muscles are sore or heavy for up to 48 hours after exercise  · Use a sleeping pad or mattress that can help you get a restful sleep  It may be difficult to find a comfortable position for sleep  Ask your healthcare provider what pad or mattress you can use to help you be more comfortable  Follow up with your healthcare provider as directed: You may need to return for more tests  Your healthcare provider may need to check you regularly for heart, lung, or spinal problems  You may be referred to a pain specialist  Harmony Orourke may also be referred to a genetic counselor to help you learn about more about MD  Write down your questions so you remember to ask them during your visits  © 2017 [employer]  Information is for End User's use only and may not be sold, redistributed or otherwise used for commercial purposes  All illustrations and images included in CareNotes® are the copyrighted property of [employer]  or [employer]  The above information is an  only  It is not intended as medical advice for individual conditions or treatments  Talk to your doctor, nurse or pharmacist before following any medical regimen to see if it is safe and effective for you

## 2019-02-15 NOTE — NURSING NOTE
Discharge instructions reviewed with patient  Patient verbalized understanding of home medication regimen, follow-up appointments, and when to seek medical care  Denies any questions/concerns at this time  Will continue to monitor patient until discharge

## 2019-02-26 NOTE — PROGRESS NOTES
Orthopaedic Surgery - Office Note  Maria Luisa Arroyo (17 y o  female)   : 1950   MRN: [id number]  Encounter Date: 2019    Chief Complaint   Patient presents with    Left Wrist - Post-op       Assessment / Plan  S/p ORIF left distal radius fracture    · NWB L wrist  · Sutures removed in office  · Removable wrist splint was given  · Return in about 1 month (around 3/26/2019)  History of Present Illness  Maria Luisa Arroyo is a 78 y o  female who presents s/p ORIF left distal radius fracture on 19  Her pain is well controlled with oral meds  Denies numbness in the fingers  Denies trouble with the splint  Review of Systems  Pertinent items are noted in HPI  All other systems were reviewed and are negative  Physical Exam  /82   Pulse 100   Ht 5' 3" (1 6 m)   Wt 63 5 kg (140 lb)   LMP  (LMP Unknown)   BMI 24 80 kg/m²   Cons: Appears well  No apparent distress  Psych: Alert  Oriented x3  Mood and affect normal   Eyes: PERRLA, EOMI  Resp: Normal effort  No audible wheezing or stridor  CV: Palpable pulse  No discernable arrhythmia  No LE edema  Lymph:  No palpable cervical, axillary, or inguinal lymphadenopathy  Skin: Warm  No palpable masses  No visible lesions  Neuro: Normal muscle tone  Normal and symmetric DTR's  Left Hand & Wrist Exam  Alignment:  Normal resting hand posture  Inspection:  mild wrist swelling  Incision clean and dry  Palpation:  mild wrist  tenderness  ROM:  Full flexion of all fingers  Full extension of all fingers  Strength:  Able to actively dorsiflex & volarflex wrist  Able to actively flex, extend, abduct, & adduct fingers  Stability:  No objective hand or wrist instability  Tests:  No pertinent positive or negative tests  Neurovascular:  Sensation intact in all digital nerve distributions  Brisk capillary refill in all fingertips  Studies Reviewed  No studies to review    Procedures  No procedures today      Medical, Surgical, Family, and Social History  The patient's medical history, family history, and social history, were reviewed and updated as appropriate      Past Medical History:   Diagnosis Date    Anemia     Anxiety     Arthritis     Asthma     Braces as ambulation aid     both legs    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    Chronic allergic rhinitis 10/13/2015    Chronic sinusitis 12/14/2015    Concussion     Depression     Environmental and seasonal allergies     Falls     Frequent headaches     Full dentures     GERD (gastroesophageal reflux disease)     occasional    Hyperlipidemia     Irritable bowel syndrome     Muscular dystrophy     braces b/l knees to feet    Neuropathy     b/l legs and feet    Osteoporosis     Thyroid nodule 10/16/2015    Upper back pain     between shoulder blades occasionally, lower back kpain    Urinary incontinence     Vitamin D deficiency     Wears dentures     upper and lower       Past Surgical History:   Procedure Laterality Date    CARPAL TUNNEL RELEASE Bilateral     COLONOSCOPY      ESOPHAGOGASTRODUODENOSCOPY      HYSTERECTOMY      KNEE ARTHROSCOPY Right     OOPHORECTOMY      NH OPEN RDL SHAFT FX OPEN RAD/ULN JT DISLOCATE Left 2/14/2019    Procedure: ORIF RADIUS / ULNA;  Surgeon: Xena Chamberlain MD;  Location: [facility];  Service: Orthopedics    WRIST SURGERY Right     pins       Family History   Problem Relation Age of Onset    Heart disease Mother     Lung disease Mother     Heart disease Father     COPD Sister     Cancer Sister     Asthma Family     Heart disease Family     Diabetes Family     Hypertension Family     Mental illness Family        Social History     Occupational History    Not on file   Tobacco Use    Smoking status: Never Smoker    Smokeless tobacco: Never Used   Substance and Sexual Activity    Alcohol use: No    Drug use: No    Sexual activity: Not on file       No Known Allergies      Current Outpatient Medications:    acetaminophen (TYLENOL) 325 mg tablet, Take 650 mg by mouth as needed  , Disp: , Rfl:     ALPRAZolam (XANAX) 0 25 mg tablet, Take 0 25 mg by mouth 2 (two) times a day as needed for anxiety  , Disp: , Rfl: 0    carBAMazepine (TEGretol) 200 mg tablet, Take 200 mg by mouth see administration instructions 1/2 tab in the morning, 1 tab at night , Disp: , Rfl: 0    cholecalciferol (VITAMIN D3) 1,000 units tablet, Take 1,000 Units by mouth every morning, Disp: , Rfl:     EPINEPHrine (EPIPEN 2-ANALIA) 0 3 mg/0 3 mL SOAJ, Inject as directed, Disp: , Rfl:     Ferrous Sulfate (IRON) 325 (65 Fe) MG TABS, Take 1 tablet by mouth 2 (two) times a day , Disp: , Rfl:     fluticasone (FLONASE) 50 mcg/act nasal spray, 2 sprays into each nostril daily, Disp: 16 g, Rfl: 0    ipratropium (ATROVENT) 0 06 % nasal spray, 2 sprays into each nostril 3 (three) times a day, Disp: 15 mL, Rfl: 0    levothyroxine (SYNTHROID) 25 mcg tablet, Take 1 tablet by mouth every morning  , Disp: , Rfl:     montelukast (SINGULAIR) 10 mg tablet, take 1 tablet by mouth at bedtime, Disp: 30 tablet, Rfl: 5    naproxen (NAPROSYN) 500 mg tablet, Take 1 tablet (500 mg total) by mouth 2 (two) times a day with meals (Patient not taking: Reported on 2/26/2019), Disp: 60 tablet, Rfl: 0    olopatadine HCl (PATADAY) 0 2 % opth drops, Administer 1 drop to both eyes daily, Disp: 1 drop, Rfl: 0    promethazine (PHENERGAN) 12 5 MG tablet, Take 1 tablet (12 5 mg total) by mouth every 6 (six) hours as needed for nausea or vomiting, Disp: 30 tablet, Rfl: 0    QVAR REDIHALER 80 MCG/ACT inhaler, Inhale 2 puffs 2 (two) times a day, Disp: 1 Inhaler, Rfl: 5    rosuvastatin (CRESTOR) 20 MG tablet, Take 20 mg by mouth daily at bedtime  , Disp: , Rfl:     venlafaxine (EFFEXOR-XR) 75 mg 24 hr capsule, Take 75 mg by mouth every morning  , Disp: , Rfl:     VENTOLIN  (90 Base) MCG/ACT inhaler, INHALE 2 PUFFS EVERY 4 HOURS AS NEEDED FOR WHEEZING, Disp: 18 g, Rfl: 1   zoledronic acid (RECLAST) 5 mg/100 mL IV infusion (premix), Infuse 5 mg into a venous catheter once, Disp: , Rfl:       Carmella Galvin MD    Scribe Attestation    I,:    am acting as a scribe while in the presence of the attending physician :        I,:    personally performed the services described in this documentation    as scribed in my presence :

## 2019-03-22 NOTE — TELEPHONE ENCOUNTER
Patient called and stated she needs a letter from you stating that she sees you for her cholesterol and Asthma conditions  Please call her when it is ready to be picked up [phone]

## 2019-03-29 NOTE — PROGRESS NOTES
Orthopaedic Surgery - Office Note  Flaquito Guevara (67 y o  female)   : 1950   MRN: [id number]  Encounter Date: 3/29/2019    Chief Complaint   Patient presents with    Left Wrist - Follow-up       Assessment / Plan  Status post ORIF left distal radius on 2019    · Continue use of removable wrist splint as needed  · Start physical therapy  · Continue activities as tolerated  Return in about 6 weeks (around 5/10/2019)  History of Present Illness  Flaquito Guevara is a 78 y o  female who presents to the office status post ORIF left distal radius on 2019  She has been compliant with her restrictions and her removable wrist brace  She continues to experience radial sided wrist pain  She has been exercising her fingers and elbow  She also notes that she feels as though her removable wrist brace is loose  She denies any numbness or tingling  Review of Systems  Pertinent items are noted in HPI  All other systems were reviewed and are negative  Physical Exam  /88   Pulse 90   Ht 5' 3" (1 6 m)   Wt 63 5 kg (140 lb)   LMP  (LMP Unknown)   BMI 24 80 kg/m²   Cons: Appears well  No apparent distress  Psych: Alert  Oriented x3  Mood and affect normal   Eyes: PERRLA, EOMI  Resp: Normal effort  No audible wheezing or stridor  CV: Palpable pulse  No discernable arrhythmia  No LE edema  Lymph:  No palpable cervical, axillary, or inguinal lymphadenopathy  Skin: Warm  No palpable masses  No visible lesions  Neuro: Normal muscle tone  Normal and symmetric DTR's  Left Hand & Wrist Exam  Alignment:  Normal resting hand posture  Inspection:  Mild swelling  No ecchymosis  Incision healed  Palpation:  Mild tenderness  ROM:  Normal elbow ROM  Wrist Extension 20 degrees  Wrist Flexion 20 degrees  Pronation 70 degrees  Supination 40 degrees  Strength:  Able to actively flex & extend elbow  Able to actively flex, extend, abduct, & adduct fingers  Stability:  Not tested    Tests:  No pertinent positive or negative tests  Neurovascular:  Sensation intact in Ax/R/M/U nerve distributions  Palpable radial pulse  Studies Reviewed  I have personally reviewed pertinent films in PACS  XR of left wrist - hardware in good alignment    Procedures  No procedures today  Medical, Surgical, Family, and Social History  The patient's medical history, family history, and social history, were reviewed and updated as appropriate      Past Medical History:   Diagnosis Date    Anemia     Anxiety     Arthritis     Asthma     Braces as ambulation aid     both legs    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    Chronic allergic rhinitis 10/13/2015    Chronic sinusitis 12/14/2015    Concussion     Depression     Environmental and seasonal allergies     Falls     Frequent headaches     Full dentures     GERD (gastroesophageal reflux disease)     occasional    Hyperlipidemia     Irritable bowel syndrome     Muscular dystrophy     braces b/l knees to feet    Neuropathy     b/l legs and feet    Osteoporosis     Thyroid nodule 10/16/2015    Upper back pain     between shoulder blades occasionally, lower back kpain    Urinary incontinence     Vitamin D deficiency     Wears dentures     upper and lower       Past Surgical History:   Procedure Laterality Date    CARPAL TUNNEL RELEASE Bilateral     COLONOSCOPY      ESOPHAGOGASTRODUODENOSCOPY      HYSTERECTOMY      KNEE ARTHROSCOPY Right     OOPHORECTOMY      ND OPEN RDL SHAFT FX OPEN RAD/ULN JT DISLOCATE Left 2/14/2019    Procedure: ORIF RADIUS / Ardena Kanreal;  Surgeon: Benny Lopez MD;  Location: [facility];  Service: Orthopedics    WRIST SURGERY Right     pins       Family History   Problem Relation Age of Onset    Heart disease Mother     Lung disease Mother     Heart disease Father     COPD Sister     Cancer Sister     Asthma Family     Heart disease Family     Diabetes Family     Hypertension Family     Mental illness Family        Social History     Occupational History    Not on file   Tobacco Use    Smoking status: Never Smoker    Smokeless tobacco: Never Used   Substance and Sexual Activity    Alcohol use: No    Drug use: No    Sexual activity: Not on file       No Known Allergies      Current Outpatient Medications:     acetaminophen (TYLENOL) 325 mg tablet, Take 650 mg by mouth as needed  , Disp: , Rfl:     ALPRAZolam (XANAX) 0 25 mg tablet, Take 0 25 mg by mouth 2 (two) times a day as needed for anxiety  , Disp: , Rfl: 0    carBAMazepine (TEGretol) 200 mg tablet, Take 200 mg by mouth see administration instructions 1/2 tab in the morning, 1 tab at night , Disp: , Rfl: 0    cholecalciferol (VITAMIN D3) 1,000 units tablet, Take 1,000 Units by mouth every morning, Disp: , Rfl:     EPINEPHrine (EPIPEN 2-ANALIA) 0 3 mg/0 3 mL SOAJ, Inject as directed, Disp: , Rfl:     Ferrous Sulfate (IRON) 325 (65 Fe) MG TABS, Take 1 tablet by mouth 2 (two) times a day , Disp: , Rfl:     fluticasone (FLONASE) 50 mcg/act nasal spray, 2 sprays into each nostril daily, Disp: 16 g, Rfl: 0    ipratropium (ATROVENT) 0 06 % nasal spray, 2 sprays into each nostril 3 (three) times a day, Disp: 15 mL, Rfl: 0    levothyroxine (SYNTHROID) 25 mcg tablet, Take 1 tablet by mouth every morning  , Disp: , Rfl:     montelukast (SINGULAIR) 10 mg tablet, take 1 tablet by mouth at bedtime, Disp: 30 tablet, Rfl: 5    naproxen (NAPROSYN) 500 mg tablet, Take 1 tablet (500 mg total) by mouth 2 (two) times a day with meals (Patient not taking: Reported on 2/26/2019), Disp: 60 tablet, Rfl: 0    olopatadine HCl (PATADAY) 0 2 % opth drops, Administer 1 drop to both eyes daily, Disp: 1 drop, Rfl: 0    promethazine (PHENERGAN) 12 5 MG tablet, Take 1 tablet (12 5 mg total) by mouth every 6 (six) hours as needed for nausea or vomiting, Disp: 30 tablet, Rfl: 0    QVAR REDIHALER 80 MCG/ACT inhaler, Inhale 2 puffs 2 (two) times a day, Disp: 1 Inhaler, Rfl: 5    rosuvastatin (CRESTOR) 20 MG tablet, Take 20 mg by mouth daily at bedtime  , Disp: , Rfl:     venlafaxine (EFFEXOR-XR) 75 mg 24 hr capsule, Take 75 mg by mouth every morning  , Disp: , Rfl:     VENTOLIN  (90 Base) MCG/ACT inhaler, INHALE 2 PUFFS EVERY 4 HOURS AS NEEDED FOR WHEEZING, Disp: 18 g, Rfl: 1    zoledronic acid (RECLAST) 5 mg/100 mL IV infusion (premix), Infuse 5 mg into a venous catheter once, Disp: , Rfl:       [employer]    I,:   Tawny Leone am acting as a scribe while in the presence of the attending physician :        I,:   Svetlana Azevedo MD personally performed the services described in this documentation    as scribed in my presence :

## 2019-07-01 NOTE — PROGRESS NOTES
Daily Note     Today's date: 2019  Patient name: Vidal Rosas  : 1950  MRN: [id number]  Referring provider: Jose Luis Taveras DO  Dx:   Encounter Diagnosis     ICD-10-CM    1  S/P ORIF (open reduction internal fixation) fracture Z96 7     Z87 81    2  Closed fracture of distal end of left radius, unspecified fracture morphology, sequela S52 502S                   Subjective: It's feeling better  Still some unresolved pain around radial aspect of thumb, but overall noting significant improvement over the past 48 hours with use of brace at night  L 4th finger locking at times  Objective: See treatment diary below  Progressed isolated finger and  strengthening     AROM/PROM Left Wrist   Wrist flexion: 65/75 degrees Wrist extension: 45/50 egrees with pain  Radial deviation: 15/15 degrees with pain  Ulnar deviation: 25/ 30 degrees       Assessment:  Functional ROM improved significantly over the past month  Pain is also decreasing leading to better use of hand and quality of life  May have trigger finger (L, 4th finger)  Plan: Continue per plan of care  Progress treatment as tolerated  Likely one more visit      Precautions: Charcot-Rashida-Tooth Disease, anxiety    Daily Treatment Diary  Manuals          PROM L wrist flexion/extension, UD/RD,  forearm pronation/supination [facility]         PROM L thumb adduction [facility]                                   Exercise Diary              UBE 3/3 3 min ea 3 min ea 3/3         Pulley flexion 6 min  10 sec hold 10" hold, 6 min 10" hold, 6 min 10 sechold   x 6 min         Self wrist flexion/extension stretch X 20 x20 x20 10 sec x 20                      Wrist AROM flexion/extension 2# X 30 2#  x30 2#  x30 2#  X 30         Wrist AROM UD/RD 2# X 30 2#  x30 2#  x30 2#  X 30         Forearm AROM pronation/supination 2# X 30 2#  x30 2#  x30 2#  X 30                      Flex bar flexion/extension Green  X 20 Green  x20 Green  x20 Green x 20 Digiflex - each finger and full  Yellow  2 min each Yellow   2 min ea Yellow 2 min ea Red x 2 min  each         Flex bar, pronation/supination Green 2 x 10 Green  x20 Green  x20 Green x 20                                                                                                                                                        Modalities             MH L wrist (1) 10 min 10 min 10 min 10 min         Cryo L wrist post 10 min 10 min 10 min 10 min

## 2019-07-05 NOTE — PROGRESS NOTES
PT Discharge    Today's date: 2019  Patient name: Devika Kidd  : 1950  MRN: [id number]  Referring provider: Marleny Nicole DO  Dx:   No diagnosis found  Assessment  Assessment details: Patient is a 78 y o  female S/P L distal radius ORIF  on 19  Since starting therapy pt has made the following progress towards goals:  1) Decreased pain  2) Improved range of motion  3) Improved strength  4) Improved self rated functional score  Making good progress, still with some unresolved pain secondary to DeQuervain's Tenosynovitis  At this bekah recommend she continue to wear brace at night at during periods of increased activity, and continue with home exercise program for stretching and strengthening  Prognosis: good    Goals  Short Term Goals:    1) Pain: Decrease L wrist pain to 3/10 at worst x 1 continuous week within 4 weeks  -not met  2) ROM: Improve L wrist AROM by at least 6 degrees for all noted a limited within 4 weeks  -met  3) Strength: Improve L wrist strength by at least 1 grade for all noted as weak within 4 weeks, Improved L  strength by at least 10# within 4 weeks  -met  4) Function: Improved FOTO self rated functional scale score, patient to note improved ADLs within 4 weeks  -met      Long Term Goals:  1) Pain: Eliminate  L wrist pain  1 continuous week within 4 weeks  -not met  2) ROM: Improve L wrist AROM to full within 8 weeks-not met for all  3) Strength: Improve L wrist strength to 5/5 Improved L  strength by at least 15# within 8 weeks  -not met for all  4) Function: Improved FOTO self rated functional scale score to at least 62, no reported difficulty with ADLs within 8 weeks  -not met  5) I with HEP within 8 weeks  -met    Plan  Plan details: Advised patient to continue with home exercise program which I reviewed with them today  Advised patient to contact me with any future questions or concerns regarding exercise   Pt is in agreement with discharge plan     Planned therapy interventions: home exercise program  Treatment plan discussed with: patient        Subjective Evaluation    History of Present Illness  Mechanism of injury: Patient is a R hand dominant  78 y o  old female who presents for an initial outpatient physical therapy consultation regarding their L  Wrist     KATRIN: Fall onto outstretched L arm after slipping on snow and ice in parking lot on 19, suffered a L distal radius fracture at that time  Was in hard splint, now soft splint  At this time complains of L wrist pain, dressing, donning/doffing shoes, bathing  UPDATE: Noting better use of L arm for ADLs  Still feels weak on L side vs R  Still some unresolved pain around radial aspect of thumb, but overall noting significant improvement with use of brace at night  L 4th finger locking at times, she is concerned about it         Pain  Current pain ratin  At best pain ratin  At worst pain ratin  Quality: sharp    Social Support  Steps to enter house: yes  Lives in: multiple-level home (stays on frist floor  Her grandson loves with her )    Patient Goals  Patient goals for therapy: decreased pain, increased strength, increased motion and independence with ADLs/IADLs (pt has made progress towards personal goals )          Objective     Observations     Left Wrist/Hand   Positive for incision  Additional Observation Details  Surgical scar evident L volar wrist    Tenderness     Left Wrist/Hand   Tenderness in the distal radioulnar joint       Active Range of Motion   Left Shoulder   Flexion: 155 degrees   Abduction: 150 degrees     Right Shoulder   Flexion: 165 degrees   Abduction: 165 degrees     Left Elbow   Flexion: WFL  Extension: WFL  Forearm supination: 50 degrees   Forearm pronation: 75 degrees     Right Elbow   Normal active range of motion    Left Wrist   Wrist flexion: 10 degrees with pain  Wrist extension: 5 degrees with pain  Radial deviation: 5 degrees with pain  Ulnar deviation: 10 degrees with pain      Right Wrist   Normal active range of motion    Passive Range of Motion     Left Wrist   Wrist flexion: 75 degrees   Wrist extension: 55 degrees   Radial deviation: 20 degrees   Ulnar deviation: 30 degrees with pain    Strength/Myotome Testing     Left Elbow   Flexion: 4+  Extension: 4    Right Elbow   Flexion: 5  Extension: 5    Left Wrist/Hand   Wrist extension: 4+  Wrist flexion: 5  Radial deviation: 4+  Ulnar deviation: 4+     (2nd hand position)     Trial 1: 15    Trial 2: 10    Right Wrist/Hand      (2nd hand position)     Trial 1: 20    Trial 2: 20    Tests     Left Wrist/Hand   Positive Finkelstein's         Flowsheet Rows      Most Recent Value   PT/OT G-Codes   Current Score  55   Projected Score  62          Precautions: Charcot-Rashida-Tooth Disease,  anxiety  Daily Treatment Diary  Manuals 6/20 6/24 6/27 7/1 7/5             PROM L wrist flexion/extension, UD/RD,  forearm pronation/supination [facility]             PROM L thumb adduction [facility]                                                             Exercise Diary                        UBE 3/3 3 min ea 3 min ea 3/3  3/3             Pulley flexion 6 min  10 sec hold 10" hold, 6 min 10" hold, 6 min 10 sechold   x 6 min  6 min  10 sec hold             Self wrist flexion/extension stretch X 20 x20 x20 10 sec x 20  10 sec x 20                                     Wrist AROM flexion/extension 2# X 30 2#  x30 2#  x30 2#  X 30  2# x 30             Wrist AROM UD/RD 2# X 30 2#  x30 2#  x30 2#  X 30  2#  X 30             Forearm AROM pronation/supination 2# X 30 2#  x30 2#  x30 2#  X 30  2#  X 30                                     Flex bar flexion/extension Green  X 20 Green  x20 Green  x20 Green x 20  Green x 20             Digiflex - each finger and full  Yellow  2 min each Yellow   2 min ea Yellow 2 min ea Red x 2 min  each  red x 2 min each             Flex bar, pronation/supination Green 2 x 10 Green  x20 Green  x20 Green x 20  Green x 20                                                                                                                                                                                                                                                                                     Modalities                       MH L wrist (1) 10 min 10 min 10 min 10 min  NP             Cryo L wrist post 10 min 10 min 10 min 10 min  10 min

## 2019-08-13 NOTE — PATIENT INSTRUCTIONS
What is it TRIGGER FINGER? Stenosing tenosynovitis, commonly known as trigger finger or trigger thumb, involves the pulleys and tendons in the hand that bend the fingers  The tendons work like long ropes connecting the muscles of the forearm with the bones of the fingers and thumb  In the finger, the pulleys are a series of rings that form a tunnel through which the tendons must glide, much like the guides on a fishing lisy through which the line (or tendon) must pass  These pulleys hold the tendons close against the bone  The tendons and the tunnel have a slick lining that allows easy gliding of the tendon through the pulleys (see Figure 1)  Trigger finger/thumb occurs when the pulley at the base of the finger becomes too thick and constricting around the tendon, making it hard for the tendon to move freely through the pulley  Sometimes the tendon develops a nodule (knot) or swelling of its lining  Because of the increased resistance to the gliding of the tendon through the  pulley, one may feel pain, popping, or a catching feeling in the finger or thumb (see Figure 2)  When the tendon catches, it produces irritation and more swelling  This causes a vicious cycle of triggering, irritation, and swelling  Sometimes the finger becomes stuck or locked, and is hard to straighten or bend  What causes it? Causes for this condition are not always clear  Some trigger fingers are associated with medical conditions such as rheumatoid arthritis, gout, and diabetes  Local trauma to the palm/base of the finger may be a factor on occasion, but in most cases there is not a clear cause  Signs and symptoms   Trigger finger/thumb may start with discomfort felt at the base of the finger or thumb, where they join the palm  This area is often tender to local pressure  A nodule may sometimes be found in this area   When the finger begins to trigger or lock, the patient may think the  problem is at the middle knuckle of the finger or the tip knuckle of the thumb, since the tendon that is sticking is the one that moves these joints  Treatment  The goal of treatment in trigger finger/thumb is to eliminate the catching or locking and allow full movement of the finger or thumb without discomfort  Swelling around the flexor tendon and tendon sheath must be reduced to allow smooth gliding of the tendon  The wearing of a splint or taking an oral anti-inflammatory medication may sometimes  help  Treatment may also include changing activities to reduce swelling  An injection of steroid into the area around the tendon and pulley is often effective in relieving the trigger finger/thumb  If non-surgical forms of treatment do not relieve the symptoms, surgery may be recommended  This surgery is performed as an outpatient, usually with simple local anesthesia  The goal of surgery is to open the pulley at the base of the finger so that the tendon can glide more freely (see Figure 3)  Active motion of the finger generally begins immediately after surgery  Normal use of the hand can usually be resumed once comfort permits  Some patients may feel tenderness, discomfort, and swelling about the area of their surgery longer than others  Occasionally, hand therapy is required after surgery to regain  better use  © 2012 [employer] the Hand  www handcare  org     What is Tilda Cameron Syndrome? Patients with de Quervain syndrome have painful tendons on the thumb side of the wrist  Tendons are the ropes that the muscle uses to pull the bone  You can see them on the back of your hand when you straighten your fingers  In  de Quervain syndrome, the tunnel (the first extensor compartment; see Figure 1A-B) where the tendons run narrows due to the thickening of the soft tissues that make up the tunnel  Hand and thumb motion cause pain, especially with forceful grasping or twisting         Causes  Doctors are not sure what causes de Quervain syndrome  Patients often describe a feeling of inflammation, but studies have shown that the process is not inflammatory  People of all ages get it  When new mothers develop  de Quervain syndrome, it typically appears 4 to 6 weeks after delivery  The old theory that it was caused by wringing out cloth diapers has been replaced by concerns about holding the baby, but changes in hormones and swelling seem more probable  Treatment  Treatments that can relieve symptoms include:   A splint that stops you from moving your thumb and wrist    Tylenol or aspirin type medications (e g , ibuprofen)  Treatments that attempt to change the course of the disease include:   A cortisone-type of steroid injection into the tendon compartment  It has not been clearly established that    these injections change the course of the disease and response to the injection varies   Surgery to open the tunnel and make more room for the tendons  © 2012 [employer] Hand  www handcare  org

## 2019-08-13 NOTE — PROGRESS NOTES
Orthopaedic Surgery - Office Note  Nona Patricio (52 y o  female)   : 1950   MRN: [id number]  Encounter Date: 2019    Chief Complaint   Patient presents with    Left Wrist - Follow-up       Assessment / Plan  S/p left distal radius ORIF on 19  Left ring trigger finger  Left de Quervain tenosynovitis    · CSI of left 1st dorsal extensor compartment and left ring finger flexor tendon sheath was performed   · No restrictions in regards to her wrist   · Continue with comfort cool brace and band aid splinting for finger  Return in about 2 months (around 10/13/2019) for Recheck  History of Present Illness  Nona Patricio is a 78 y o  female who presents S/p left distal radius ORIF on 19  Patient states she has been doing well in regards to her left wrist however now she is experiencing locking of her left ring finger and states that this is very painful especially at night  She describes having to physically open her ring finger on her own  She also complains of pain to her radial side of wrist with thumb extension and describes radiating pain that goes up her forearm  She denies numbness and tingling  She has tried bracing her ring finger and thumb without relief of her symptoms  Review of Systems  Pertinent items are noted in HPI  All other systems were reviewed and are negative  Physical Exam  /79   Pulse 91   Ht 5' 3" (1 6 m)   Wt 65 kg (143 lb 6 4 oz)   LMP  (LMP Unknown)   BMI 25 40 kg/m²   Cons: Appears well  No apparent distress  Psych: Alert  Oriented x3  Mood and affect normal   Eyes: PERRLA, EOMI  Resp: Normal effort  No audible wheezing or stridor  CV: Palpable pulse  No discernable arrhythmia  No LE edema  Lymph:  No palpable cervical, axillary, or inguinal lymphadenopathy  Skin: Warm  No palpable masses  No visible lesions  Neuro: Normal muscle tone  Normal and symmetric DTR's       Left Hand & Wrist Exam  Alignment:  Normal resting hand posture  Inspection:  No swelling  Incision healed  Palpation:  A1 pulley of ring finger, Radial styloid tenderness  ROM:  Normal finger ROM  Strength:  5/5 wrist flexors and wrist extensors  Stability:  No objective hand or wrist instability  Tests:  (+) Finkelstein  (+) Triggering of the Ring finger  Neurovascular:  Sensation intact in Ax/R/M/U nerve distributions  2+ radial pulse  Studies Reviewed  I have personally reviewed pertinent films in PACS and my interpretation is X-ray of left wrist on 08/13/2019 demonstrates a healed distal radius fracture without evidence of hardware complications  Hand/upper extremity injection: L ring A1  Date/Time: 8/13/2019 11:36 AM  Consent given by: patient  Site marked: site marked  Supporting Documentation  Indications: tendon swelling   Procedure Details  Condition:trigger finger Location: ring finger - L ring A1   Medications administered: 0 5 mL bupivacaine 0 25 %; 0 5 mL methylPREDNISolone acetate 40 mg/mL    Patient tolerance: patient tolerated the procedure well with no immediate complications  Dressing:  Sterile dressing applied     Hand/upper extremity injection: L extensor compartment 1  Date/Time: 8/13/2019 11:37 AM  Consent given by: patient  Site marked: site marked  Supporting Documentation  Indications: tendon swelling   Procedure Details  Condition:de Quervain's tenosynovitis Site: L extensor compartment 1   Medications administered: 0 5 mL bupivacaine 0 25 %; 0 5 mL methylPREDNISolone acetate 40 mg/mL    Patient tolerance: patient tolerated the procedure well with no immediate complications  Dressing:  Sterile dressing applied              Medical, Surgical, Family, and Social History  The patient's medical history, family history, and social history, were reviewed and updated as appropriate      Past Medical History:   Diagnosis Date    Anemia     Anxiety     Arthritis     Asthma     Braces as ambulation aid     both legs    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    Chronic allergic rhinitis 10/13/2015    Chronic sinusitis 12/14/2015    Concussion     Depression     Environmental and seasonal allergies     Falls     Frequent headaches     Full dentures     GERD (gastroesophageal reflux disease)     occasional    Hyperlipidemia     Irritable bowel syndrome     Muscular dystrophy (HCC)     braces b/l knees to feet    Neuropathy     b/l legs and feet    Osteoporosis     Thyroid nodule 10/16/2015    Upper back pain     between shoulder blades occasionally, lower back kpain    Urinary incontinence     Vitamin D deficiency     Wears dentures     upper and lower       Past Surgical History:   Procedure Laterality Date    CARPAL TUNNEL RELEASE Bilateral     COLONOSCOPY      ESOPHAGOGASTRODUODENOSCOPY      HYSTERECTOMY      KNEE ARTHROSCOPY Right     OOPHORECTOMY      [place] OPEN RDL SHAFT FX OPEN RAD/ULN JT DISLOCATE Left 2/14/2019    Procedure: ORIF RADIUS / Carnella Chroman;  Surgeon: Marina Ruelas MD;  Location: [facility];  Service: Orthopedics    WRIST SURGERY Right     pins       Family History   Problem Relation Age of Onset    Heart disease Mother     Lung disease Mother     Heart disease Father     COPD Sister     Cancer Sister     Asthma Family     Heart disease Family     Diabetes Family     Hypertension Family     Mental illness Family        Social History     Occupational History    Not on file   Tobacco Use    Smoking status: Never Smoker    Smokeless tobacco: Never Used   Substance and Sexual Activity    Alcohol use: No    Drug use: No    Sexual activity: Not on file       No Known Allergies      Current Outpatient Medications:     acetaminophen (TYLENOL) 325 mg tablet, Take 650 mg by mouth as needed  , Disp: , Rfl:     ALPRAZolam (XANAX) 0 25 mg tablet, Take 0 25 mg by mouth 2 (two) times a day as needed for anxiety  , Disp: , Rfl: 0    carBAMazepine (TEGretol) 200 mg tablet, Take 200 mg by mouth see administration instructions 1/2 tab in the morning, 1 tab at night , Disp: , Rfl: 0    cholecalciferol (VITAMIN D3) 1,000 units tablet, Take 1,000 Units by mouth every morning, Disp: , Rfl:     EPINEPHrine (EPIPEN 2-ANALIA) 0 3 mg/0 3 mL SOAJ, Inject as directed, Disp: , Rfl:     Ferrous Sulfate (IRON) 325 (65 Fe) MG TABS, Take 1 tablet by mouth 2 (two) times a day , Disp: , Rfl:     fluticasone (FLONASE) 50 mcg/act nasal spray, 2 sprays into each nostril daily, Disp: 16 g, Rfl: 0    ipratropium (ATROVENT) 0 06 % nasal spray, 2 sprays into each nostril 3 (three) times a day, Disp: 15 mL, Rfl: 0    levothyroxine (SYNTHROID) 25 mcg tablet, Take 1 tablet (25 mcg total) by mouth every morning, Disp: 90 tablet, Rfl: 1    montelukast (SINGULAIR) 10 mg tablet, take 1 tablet by mouth at bedtime, Disp: 30 tablet, Rfl: 5    naproxen (NAPROSYN) 500 mg tablet, Take 1 tablet (500 mg total) by mouth 2 (two) times a day with meals, Disp: 60 tablet, Rfl: 0    olopatadine HCl (PATADAY) 0 2 % opth drops, Administer 1 drop to both eyes daily, Disp: 1 drop, Rfl: 0    promethazine (PHENERGAN) 12 5 MG tablet, Take 1 tablet (12 5 mg total) by mouth every 6 (six) hours as needed for nausea or vomiting, Disp: 30 tablet, Rfl: 0    QVAR REDIHALER 80 MCG/ACT inhaler, Inhale 2 puffs 2 (two) times a day, Disp: 1 Inhaler, Rfl: 5    rosuvastatin (CRESTOR) 20 MG tablet, Take 1 tablet (20 mg total) by mouth daily at bedtime, Disp: 90 tablet, Rfl: 1    venlafaxine (EFFEXOR-XR) 75 mg 24 hr capsule, Take 75 mg by mouth every morning  , Disp: , Rfl:     VENTOLIN  (90 Base) MCG/ACT inhaler, INHALE 2 PUFFS EVERY 4 HOURS AS NEEDED FOR WHEEZING, Disp: 18 g, Rfl: 1    zoledronic acid (RECLAST) 5 mg/100 mL IV infusion (premix), Infuse 5 mg into a venous catheter once, Disp: , Rfl:       Scribe Attestation    I,:   Javier Osman am acting as a scribe while in the presence of the attending physician :        I,:   Tony Willard MD personally performed the services described in this documentation    as scribed in my presence :

## 2019-09-17 NOTE — PROGRESS NOTES
Orthopaedic Surgery - Office Note  Benito Muro (11 y o  female)   : 1950   MRN: [id number]  Encounter Date: 2019    Chief Complaint   Patient presents with    Left Wrist - Follow-up       Assessment / Plan  S/p left distal radius ORIF on 19  Left ring trigger finger  Left de Quervain tenosynovitis    · Band-Aid to affected finger at night  · Brace for left wrist as needed  · Follow up as needed - would like to consider repeat injection before surgical release of trigger finger if symptoms reoccur  · No follow-ups on file  History of Present Illness  Benito Muro is a 78 y o  female who presents today for follow-up of her left ring trigger and left wrist tenosynovitis  She received a corticosteroid injection for both at her last visit here on 2019  The injection helped her somewhat but she still notes continued discomfort and daily locking in the left ring finger  Her wrist has improved after that injection  Review of Systems  Pertinent items are noted in HPI  All other systems were reviewed and are negative  Physical Exam  /79   Pulse 92   Ht 5' 3" (1 6 m)   Wt 64 9 kg (143 lb)   LMP  (LMP Unknown)   BMI 25 33 kg/m²   Cons: Appears well  No apparent distress  Psych: Alert  Oriented x3  Mood and affect normal   Eyes: PERRLA, EOMI  Resp: Normal effort  No audible wheezing or stridor  CV: Palpable pulse  No discernable arrhythmia  no edema  Lymph:  No palpable cervical, axillary, or inguinal lymphadenopathy  Skin: Warm  No palpable masses  No visible lesions  Neuro: Normal muscle tone  Normal and symmetric DTR's  Left Hand & Wrist Exam  Alignment:  Normal resting hand posture  Inspection:  No swelling  No edema  Palpation:  Left ring and middle A1 pulley tenderness tenderness  ROM:  Not tested  Strength:  Not tested  Stability:  No objective hand or wrist instability  Tests:  (+) Triggering of the left fing finger with palpable nodule    Neurovascular: Sensation intact in Ax/R/M/U nerve distributions  2+ radial pulse  Studies Reviewed  No studies to review    Procedures  No procedures today  Medical, Surgical, Family, and Social History  The patient's medical history, family history, and social history, were reviewed and updated as appropriate      Past Medical History:   Diagnosis Date    Anemia     Anxiety     Arthritis     Asthma     Braces as ambulation aid     both legs    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    Chronic allergic rhinitis 10/13/2015    Chronic sinusitis 12/14/2015    Concussion     Depression     Environmental and seasonal allergies     Falls     Frequent headaches     Full dentures     GERD (gastroesophageal reflux disease)     occasional    Hyperlipidemia     Irritable bowel syndrome     Muscular dystrophy (HCC)     braces b/l knees to feet    Neuropathy     b/l legs and feet    Osteoporosis     Thyroid nodule 10/16/2015    Upper back pain     between shoulder blades occasionally, lower back kpain    Urinary incontinence     Vitamin D deficiency     Wears dentures     upper and lower       Past Surgical History:   Procedure Laterality Date    CARPAL TUNNEL RELEASE Bilateral     COLONOSCOPY      ESOPHAGOGASTRODUODENOSCOPY      HYSTERECTOMY      KNEE ARTHROSCOPY Right     OOPHORECTOMY      [place] OPEN RDL SHAFT FX OPEN RAD/ULN JT DISLOCATE Left 2/14/2019    Procedure: ORIF RADIUS / Doyle Perez;  Surgeon: Marion To MD;  Location: [facility];  Service: Orthopedics    WRIST SURGERY Right     pins       Family History   Problem Relation Age of Onset    Heart disease Mother     Lung disease Mother     Heart disease Father     COPD Sister     Cancer Sister     Asthma Family     Heart disease Family     Diabetes Family     Hypertension Family     Mental illness Family        Social History     Occupational History    Not on file   Tobacco Use    Smoking status: Never Smoker    Smokeless tobacco: Never Used   Substance and Sexual Activity    Alcohol use: No    Drug use: No    Sexual activity: Not on file       No Known Allergies      Current Outpatient Medications:     acetaminophen (TYLENOL) 325 mg tablet, Take 650 mg by mouth as needed  , Disp: , Rfl:     ALPRAZolam (XANAX) 0 25 mg tablet, Take 0 25 mg by mouth 2 (two) times a day as needed for anxiety  , Disp: , Rfl: 0    carBAMazepine (TEGretol) 200 mg tablet, Take 200 mg by mouth see administration instructions 1/2 tab in the morning, 1 tab at night , Disp: , Rfl: 0    cholecalciferol (VITAMIN D3) 1,000 units tablet, Take 1,000 Units by mouth every morning, Disp: , Rfl:     EPINEPHrine (EPIPEN 2-ANALIA) 0 3 mg/0 3 mL SOAJ, Inject as directed, Disp: , Rfl:     Ferrous Sulfate (IRON) 325 (65 Fe) MG TABS, Take 1 tablet by mouth 2 (two) times a day , Disp: , Rfl:     fluticasone (FLONASE) 50 mcg/act nasal spray, 2 sprays into each nostril daily, Disp: 16 g, Rfl: 0    ipratropium (ATROVENT) 0 06 % nasal spray, 2 sprays into each nostril 3 (three) times a day, Disp: 15 mL, Rfl: 0    levothyroxine (SYNTHROID) 25 mcg tablet, Take 1 tablet (25 mcg total) by mouth every morning, Disp: 90 tablet, Rfl: 1    montelukast (SINGULAIR) 10 mg tablet, Take 1 tablet (10 mg total) by mouth daily at bedtime, Disp: 30 tablet, Rfl: 5    naproxen (NAPROSYN) 500 mg tablet, Take 1 tablet (500 mg total) by mouth 2 (two) times a day with meals, Disp: 60 tablet, Rfl: 0    olopatadine HCl (PATADAY) 0 2 % opth drops, Administer 1 drop to both eyes daily, Disp: 1 drop, Rfl: 0    promethazine (PHENERGAN) 12 5 MG tablet, Take 1 tablet (12 5 mg total) by mouth every 6 (six) hours as needed for nausea or vomiting, Disp: 30 tablet, Rfl: 0    QVAR REDIHALER 80 MCG/ACT inhaler, Inhale 2 puffs 2 (two) times a day, Disp: 1 Inhaler, Rfl: 5    rosuvastatin (CRESTOR) 20 MG tablet, Take 1 tablet (20 mg total) by mouth daily at bedtime, Disp: 90 tablet, Rfl: 1    venlafaxine (EFFEXOR-XR) 75 mg 24 hr capsule, Take 75 mg by mouth every morning  , Disp: , Rfl:     VENTOLIN  (90 Base) MCG/ACT inhaler, INHALE 2 PUFFS EVERY 4 HOURS AS NEEDED FOR WHEEZING, Disp: 18 g, Rfl: 1    zoledronic acid (RECLAST) 5 mg/100 mL IV infusion (premix), Infuse 5 mg into a venous catheter once, Disp: , Rfl:       Ottoniel Reyes PA-C    Scribe Attestation    I,:    am acting as a scribe while in the presence of the attending physician :        I,:    personally performed the services described in this documentation    as scribed in my presence :

## 2019-11-13 NOTE — PROGRESS NOTES
[facility]      NAME: Lele Pierce  AGE: 78 y o  SEX: female  : 1950   MRN: [id number]    DATE: 2019  TIME: 2:52 PM    Assessment and Plan     Problem List Items Addressed This Visit     Irritable bowel syndrome with constipation      Continue with high-fiber diet  Add Senokot or MiraLax for constipation  Also patient does not need iron supplements at this time so will have her discontinue which may also assist in more normal bowel movements  Other Visit Diagnoses     Dysuria    -  Primary    Relevant Orders    POCT urine dip auto non-scope (Completed)    Candidiasis        Relevant Medications    nystatin (MYCOSTATIN) cream              Return to office in:   P r n  Chief Complaint     Chief Complaint   Patient presents with    Spots and/or Floaters     Pt c/o spots on skin, would like urine checked  med questions  History of Present Illness     Patient presents to review several concerns  First she has several supplements and she is not sure which 1 she should take  She has vitamin-D, calcium with vitamin D and iron  She does complain of constipation  She also complains of skin lesions on her chest and below her breast       The following portions of the patient's history were reviewed and updated as appropriate: allergies, current medications, past family history, past medical history, past social history, past surgical history and problem list     Review of Systems   Review of Systems   Constitutional: Negative  Respiratory: Negative  Cardiovascular: Negative  Gastrointestinal: Positive for constipation  Genitourinary: Negative  Musculoskeletal: Negative  Skin:        Skin rash below breasts and multiple lesions on torso   Psychiatric/Behavioral: Negative          Active Problem List     Patient Active Problem List   Diagnosis    Anemia    Chronic sinusitis    Depression    Esophagitis, reflux    Hypothyroidism    Irritable bowel syndrome with constipation    Lower abdominal pain    Mixed hyperlipidemia    Neuralgia    Osteoporosis    Postmenopausal osteoporosis    Prediabetes    Thyroid nodule    Vitamin D deficiency    Charcot-Rashida disease    Concussion    Dizziness    Headache    Moderate persistent asthma without complication    Muscular dystrophy    Closed fracture of left distal radius and ulna    Closed fracture distal radius and ulna, left, initial encounter    Hypoxia       Objective   /70 (BP Location: Left arm, Patient Position: Sitting, Cuff Size: Adult)   Pulse 95   Temp 98 3 °F (36 8 °C) (Tympanic)   Ht 5' 2 5" (1 588 m)   Wt 65 1 kg (143 lb 9 6 oz)   LMP  (LMP Unknown)   SpO2 90%   Breastfeeding?  No   BMI 25 85 kg/m²     Physical Exam   Skin:   Probable candidiasis below left breast   Multiple seborrheic keratoses on chest walls bilaterally         Current Medications     Current Outpatient Medications:     ALPRAZolam (XANAX) 0 25 mg tablet, Take 0 25 mg by mouth 2 (two) times a day as needed for anxiety  , Disp: , Rfl: 0    carBAMazepine (TEGretol) 200 mg tablet, Take 200 mg by mouth see administration instructions 1/2 tab in the morning, 1 tab at night , Disp: , Rfl: 0    cholecalciferol (VITAMIN D3) 1,000 units tablet, Take 1,000 Units by mouth every morning, Disp: , Rfl:     EPINEPHrine (EPIPEN 2-ANALIA) 0 3 mg/0 3 mL SOAJ, Inject as directed, Disp: , Rfl:     Ferrous Sulfate (IRON) 325 (65 Fe) MG TABS, Take 1 tablet by mouth 2 (two) times a day , Disp: , Rfl:     fluticasone (FLONASE) 50 mcg/act nasal spray, 2 sprays into each nostril daily, Disp: 16 g, Rfl: 0    ipratropium (ATROVENT) 0 06 % nasal spray, 2 sprays into each nostril 3 (three) times a day, Disp: 15 mL, Rfl: 0    levothyroxine (SYNTHROID) 25 mcg tablet, Take 1 tablet (25 mcg total) by mouth every morning, Disp: 90 tablet, Rfl: 1    montelukast (SINGULAIR) 10 mg tablet, Take 1 tablet (10 mg total) by mouth daily at bedtime, Disp: 30 tablet, Rfl: 5    olopatadine HCl (PATADAY) 0 2 % opth drops, Administer 1 drop to both eyes daily, Disp: 1 drop, Rfl: 0    QVAR REDIHALER 80 MCG/ACT inhaler, Inhale 2 puffs 2 (two) times a day, Disp: 1 Inhaler, Rfl: 5    rosuvastatin (CRESTOR) 20 MG tablet, Take 1 tablet (20 mg total) by mouth daily at bedtime, Disp: 90 tablet, Rfl: 1    venlafaxine (EFFEXOR-XR) 75 mg 24 hr capsule, Take 75 mg by mouth every morning  , Disp: , Rfl:     VENTOLIN  (90 Base) MCG/ACT inhaler, INHALE 2 PUFFS EVERY 4 HOURS AS NEEDED FOR WHEEZING, Disp: 18 g, Rfl: 1    acetaminophen (TYLENOL) 325 mg tablet, Take 650 mg by mouth as needed  , Disp: , Rfl:     naproxen (NAPROSYN) 500 mg tablet, Take 1 tablet (500 mg total) by mouth 2 (two) times a day with meals (Patient not taking: Reported on 11/13/2019), Disp: 60 tablet, Rfl: 0    nystatin (MYCOSTATIN) cream, Apply topically 2 (two) times a day, Disp: 30 g, Rfl: 0    promethazine (PHENERGAN) 12 5 MG tablet, Take 1 tablet (12 5 mg total) by mouth every 6 (six) hours as needed for nausea or vomiting (Patient not taking: Reported on 11/13/2019), Disp: 30 tablet, Rfl: 0    zoledronic acid (RECLAST) 5 mg/100 mL IV infusion (premix), Infuse 5 mg into a venous catheter once, Disp: , Rfl:     Health Maintenance     Health Maintenance   Topic Date Due    PT PLAN OF CARE  11/11/2018    MAMMOGRAM  12/05/2019    Hepatitis C Screening  06/04/2020 (Originally 1950)    DTaP,Tdap,and Td Vaccines (1 - Tdap) 07/03/2024 (Originally 7/4/2014)    Urinary Incontinence Screening  11/26/2019    Medicare Annual Wellness Visit (AWV)  11/26/2019    Fall Risk  04/08/2020    BMI: Followup Plan  06/04/2020    BMI: Adult  09/17/2020    CRC Screening: Colonoscopy  07/29/2022    INFLUENZA VACCINE  Completed    Pneumococcal Vaccine: 65+ Years  Completed    Pneumococcal Vaccine: Pediatrics (0 to 5 Years) and At-Risk Patients (6 to 59 Years)  Aged Out    HEPATITIS B VACCINES  Aged Out     Immunization History   Administered Date(s) Administered     Influenza (IM) Preservative Free 08/27/2013    INFLUENZA 11/01/2011, 10/15/2012, 08/27/2013, 11/12/2013, 01/08/2014, 08/05/2014, 09/26/2015, 08/22/2016, 10/27/2017    Influenza Split High Dose Preservative Free IM 09/26/2015, 08/22/2016, 08/24/2017    Influenza TIV (IM) 10/15/2012    Influenza, high dose seasonal 0 5 mL 11/05/2018    Pneumococcal Conjugate 13-Valent 08/12/2015    Pneumococcal Polysaccharide PPV23 10/15/2012, 11/26/2018    Td (adult), adsorbed 07/03/2014    Zoster 06/07/2015    influenza, trivalent, adjuvanted 09/30/2019       Shelbie Pennington DO  [facility]

## 2019-11-13 NOTE — ASSESSMENT & PLAN NOTE
Continue with high-fiber diet  Add Senokot or MiraLax for constipation  Also patient does not need iron supplements at this time so will have her discontinue which may also assist in more normal bowel movements

## 2019-11-28 PROBLEM — I61.4 CEREBELLAR BLEED (HCC): Status: ACTIVE | Noted: 2019-01-01

## 2019-11-28 PROBLEM — J45.909 MODERATE ASTHMA: Status: ACTIVE | Noted: 2019-01-01

## 2019-11-28 NOTE — ASSESSMENT & PLAN NOTE
Admit to intensive care unit  Consult Neurology  Maintain systolic blood pressure less than 140 mm of mercury  Neuro checks q 1 hour

## 2019-11-28 NOTE — ASSESSMENT & PLAN NOTE
Admit to intensive care unit  Monitor neuro checks q 1 hour  Consult Neurology  Maintain systolic blood pressure less than 140 mm of mercury  Case reviewed by Neurosurgery, deemed nonsurgical candidate  Continue mechanical ventilation  Full code until discussion with family

## 2019-11-28 NOTE — ED PROVIDER NOTES
History  Chief Complaint   Patient presents with    Altered Mental Status     Pt arrives via EMS - states was found by family on the couch this morning  Awake but unresponsive initially  Intubated pre hospital         History provided by:  EMS personnel  History limited by:  Patient unresponsive   used: No    Altered Mental Status   Presenting symptoms: unresponsiveness    Severity:  Severe  Most recent episode: Today  Episode history:  Continuous  Timing:  Constant  Progression:  Unchanged  Chronicity:  New  Context comment:  Found unsresponsive today morning by Family      Prior to Admission Medications   Prescriptions Last Dose Informant Patient Reported? Taking? ALPRAZolam (XANAX) 0 25 mg tablet  Self Yes No   Sig: Take 0 25 mg by mouth 2 (two) times a day as needed for anxiety     EPINEPHrine (EPIPEN 2-ANALIA) 0 3 mg/0 3 mL SOAJ  Self Yes No   Sig: Inject as directed   Ferrous Sulfate (IRON) 325 (65 Fe) MG TABS  Self Yes No   Sig: Take 1 tablet by mouth 2 (two) times a day    QVAR REDIHALER 80 MCG/ACT inhaler   No No   Sig: INHALE 2 PUFFS 2 TIMES DAILY     VENTOLIN  (90 Base) MCG/ACT inhaler   No No   Sig: INHALE 2 PUFFS EVERY 4 HOURS AS NEEDED FOR WHEEZING   acetaminophen (TYLENOL) 325 mg tablet  Self Yes No   Sig: Take 650 mg by mouth as needed     carBAMazepine (TEGretol) 200 mg tablet  Self Yes No   Sig: Take 200 mg by mouth see administration instructions 1/2 tab in the morning, 1 tab at night    cholecalciferol (VITAMIN D3) 1,000 units tablet  Self Yes No   Sig: Take 1,000 Units by mouth every morning   fluticasone (FLONASE) 50 mcg/act nasal spray   No No   Si sprays into each nostril daily   ipratropium (ATROVENT) 0 06 % nasal spray   No No   Si sprays into each nostril 3 (three) times a day   levothyroxine (SYNTHROID) 25 mcg tablet   No No   Sig: Take 1 tablet (25 mcg total) by mouth every morning   montelukast (SINGULAIR) 10 mg tablet   No No   Sig: Take 1 tablet (10 mg total) by mouth daily at bedtime   naproxen (NAPROSYN) 500 mg tablet   No No   Sig: Take 1 tablet (500 mg total) by mouth 2 (two) times a day with meals   Patient not taking: Reported on 11/13/2019   nystatin (MYCOSTATIN) cream   No No   Sig: Apply topically 2 (two) times a day   olopatadine HCl (PATADAY) 0 2 % opth drops   No No   Sig: Administer 1 drop to both eyes daily   promethazine (PHENERGAN) 12 5 MG tablet   No No   Sig: Take 1 tablet (12 5 mg total) by mouth every 6 (six) hours as needed for nausea or vomiting   Patient not taking: Reported on 11/13/2019   rosuvastatin (CRESTOR) 20 MG tablet   No No   Sig: Take 1 tablet (20 mg total) by mouth daily at bedtime   venlafaxine (EFFEXOR-XR) 75 mg 24 hr capsule  Self Yes No   Sig: Take 75 mg by mouth every morning     zoledronic acid (RECLAST) 5 mg/100 mL IV infusion (premix)   Yes No   Sig: Infuse 5 mg into a venous catheter once      Facility-Administered Medications: None       Past Medical History:   Diagnosis Date    Anemia     Anxiety     Arthritis     Asthma     Braces as ambulation aid     both legs    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    Chronic allergic rhinitis 10/13/2015    Chronic sinusitis 12/14/2015    Concussion     Depression     Environmental and seasonal allergies     Falls     Frequent headaches     Full dentures     GERD (gastroesophageal reflux disease)     occasional    Hyperlipidemia     Irritable bowel syndrome     Muscular dystrophy (HCC)     braces b/l knees to feet    Neuropathy     b/l legs and feet    Osteoporosis     Thyroid nodule 10/16/2015    Upper back pain     between shoulder blades occasionally, lower back kpain    Urinary incontinence     Vitamin D deficiency     Wears dentures     upper and lower       Past Surgical History:   Procedure Laterality Date    CARPAL TUNNEL RELEASE Bilateral     COLONOSCOPY      ESOPHAGOGASTRODUODENOSCOPY      HYSTERECTOMY      KNEE ARTHROSCOPY Right     OOPHORECTOMY      IA OPEN RDL SHAFT FX OPEN RAD/ULN JT DISLOCATE Left 2/14/2019    Procedure: ORIF GEORGES / Leon Alpers;  Surgeon: Deny Sorensen MD;  Location: [facility];  Service: Orthopedics    WRIST SURGERY Right     pins       Family History   Problem Relation Age of Onset    Heart disease Mother     Lung disease Mother     Heart disease Father     COPD Sister     Cancer Sister     Asthma Family     Heart disease Family     Diabetes Family     Hypertension Family     Mental illness Family      I have reviewed and agree with the history as documented  Social History     Tobacco Use    Smoking status: Never Smoker    Smokeless tobacco: Never Used   Substance Use Topics    Alcohol use: No    Drug use: No        Review of Systems   Unable to perform ROS: Patient unresponsive       Physical Exam  Physical Exam   Constitutional: She appears well-developed and well-nourished  HENT:   Head: Normocephalic and atraumatic  Eyes:   Pupils 2 mm sluggish   Neck: Neck supple  Cardiovascular: Regular rhythm, normal heart sounds and intact distal pulses  Pulmonary/Chest:   ETT in place, bilateral air entry    Abdominal: Soft  She exhibits no distension  Neurological: GCS eye subscore is 1  GCS verbal subscore is 1  GCS motor subscore is 1         Vital Signs  ED Triage Vitals   Temperature Pulse Respirations Blood Pressure SpO2   11/28/19 0842 11/28/19 0832 11/28/19 0832 11/28/19 0832 11/28/19 0832   98 6 °F (37 °C) (!) 110 18 123/73 93 %      Temp Source Heart Rate Source Patient Position - Orthostatic VS BP Location FiO2 (%)   11/28/19 0842 11/28/19 0832 11/28/19 0832 11/28/19 0832 --   Rectal Monitor Lying Right arm       Pain Score       11/28/19 0859       Worst Possible Pain           Vitals:    11/28/19 1715 11/28/19 1730 11/28/19 1745 11/28/19 1800   BP: 107/65 117/64 111/62 113/64   Pulse: 100 88 84 82   Patient Position - Orthostatic VS:             Visual Acuity  Visual Acuity      Most Recent Value   L Pupil Size (mm)  4   R Pupil Size (mm)  4   L Pupil Shape  Round   R Pupil Shape  Round          ED Medications  Medications   propofol (DIPRIVAN) 1000 mg in 100 mL infusion (premix) (0 mcg/kg/min × 67 7 kg Intravenous Hold 11/28/19 0917)   chlorhexidine (PERIDEX) 0 12 % oral rinse 15 mL (0 mL Swish & Spit Hold 11/28/19 0954)   levalbuterol (XOPENEX) inhalation solution 1 25 mg (has no administration in time range)   levothyroxine tablet 25 mcg (0 mcg Oral Hold 11/28/19 1005)   famotidine (PEPCID) oral suspension 10 mg (10 mg Oral Given 11/28/19 1526)   acetaminophen (TYLENOL) oral suspension 650 mg (650 mg Oral Given 11/28/19 1546)   hydrALAZINE (APRESOLINE) injection 20 mg (has no administration in time range)   norepinephrine (LEVOPHED) 1 mg/mL injection **ADS Override Pull** (has no administration in time range)   norepinephrine (LEVOPHED) 4 mg (STANDARD CONCENTRATION) IV in sodium chloride 0 9% 250 mL (has no administration in time range)   sodium chloride 0 9 % bolus 1,000 mL (0 mL Intravenous Stopped 11/28/19 0915)   fentanyl citrate (PF) 100 MCG/2ML 25 mcg (25 mcg Intravenous Given 11/28/19 0859)   potassium chloride 10 % oral solution 40 mEq (40 mEq Oral Given 11/28/19 1526)   hydrALAZINE (APRESOLINE) injection 10 mg (10 mg Intravenous Given 11/28/19 1556)       Diagnostic Studies  Results Reviewed     Procedure Component Value Units Date/Time    Blood culture #1 [[id number]] Collected:  11/28/19 0830    Lab Status:  Preliminary result Specimen:  Blood from Arm, Left Updated:  11/28/19 1801     Blood Culture Received in Microbiology Lab  Culture in Progress  Blood culture #2 [[id number]] Collected:  11/28/19 0837    Lab Status:  Preliminary result Specimen:  Blood from Arm, Left Updated:  11/28/19 1801     Blood Culture Received in Microbiology Lab  Culture in Progress      Rapid drug screen, urine [[id number]]  (Abnormal) Collected:  11/28/19 1714    Lab Status:  Final result Specimen:  Urine, Clean Catch Updated:  11/28/19 1732     Amph/Meth UR Negative     Barbiturate Ur Negative     Benzodiazepine Urine Positive     Cocaine Urine Negative     Methadone Urine Negative     Opiate Urine Negative     PCP Ur Negative     THC Urine Negative    Narrative:       Presumptive report  If requested, specimen will be sent to reference lab for confirmation  FOR MEDICAL PURPOSES ONLY  IF CONFIRMATION NEEDED PLEASE CONTACT THE LAB WITHIN 5 DAYS  Drug Screen Cutoff Levels:  AMPHETAMINE/METHAMPHETAMINES  1000 ng/mL  BARBITURATES     200 ng/mL  BENZODIAZEPINES     200 ng/mL  COCAINE      300 ng/mL  METHADONE      300 ng/mL  OPIATES      300 ng/mL  PHENCYCLIDINE     25 ng/mL  THC       50 ng/mL      Lactic acid, plasma x2 [[id number]]  (Abnormal) Collected:  11/28/19 1034    Lab Status:  Final result Specimen:  Blood from Arm, Right Updated:  11/28/19 1103     LACTIC ACID 5 7 mmol/L     Narrative:       Result may be elevated if tourniquet was used during collection  Acetaminophen level-If concentration is detectable, please discuss with medical  on call  [[id number]]  (Abnormal) Collected:  11/28/19 0837    Lab Status:  Final result Specimen:  Blood from Arm, Left Updated:  11/28/19 1011     Acetaminophen Level <2 ug/mL     Ethanol [[id number]]  (Normal) Collected:  11/28/19 0837    Lab Status:  Final result Specimen:  Blood from Arm, Left Updated:  11/28/19 0937     Ethanol Lvl <3 mg/dL     TSH [[id number]]  (Normal) Collected:  11/28/19 0837    Lab Status:  Final result Specimen:  Blood from Arm, Left Updated:  11/28/19 0926     TSH 3RD GENERATON 1 891 uIU/mL     Narrative:       Patients undergoing fluorescein dye angiography may retain small amounts of fluorescein in the body for 48-72 hours post procedure  Samples containing fluorescein can produce falsely depressed TSH values  If the patient had this procedure,a specimen should be resubmitted post fluorescein clearance        Phosphorus [[id number]] (Normal) Collected:  11/28/19 0837    Lab Status:  Final result Specimen:  Blood from Arm, Left Updated:  11/28/19 0926     Phosphorus 3 7 mg/dL     Magnesium [[id number]]  (Abnormal) Collected:  11/28/19 0837    Lab Status:  Final result Specimen:  Blood from Arm, Left Updated:  11/28/19 0926     Magnesium 1 2 mg/dL     Lipase [[id number]]  (Normal) Collected:  11/28/19 0837    Lab Status:  Final result Specimen:  Blood from Arm, Left Updated:  11/28/19 0926     Lipase 86 u/L     NT-BNP PRO [[id number]]  (Normal) Collected:  11/28/19 0837    Lab Status:  Final result Specimen:  Blood from Arm, Left Updated:  11/28/19 0926     NT-proBNP 82 pg/mL     Salicylate level [[id number]]  (Abnormal) Collected:  11/28/19 0837    Lab Status:  Final result Specimen:  Blood from Arm, Left Updated:  15/34/64 7185     Salicylate Lvl <3 mg/dL     Lactic acid, plasma x2 [[id number]]  (Abnormal) Collected:  11/28/19 0837    Lab Status:  Final result Specimen:  Blood from Arm, Left Updated:  11/28/19 0914     LACTIC ACID 5 0 mmol/L     Narrative:       Result may be elevated if tourniquet was used during collection      Protime-INR [[id number]]  (Normal) Collected:  11/28/19 0837    Lab Status:  Final result Specimen:  Blood from Arm, Left Updated:  11/28/19 0913     Protime 14 1 seconds      INR 1 08    APTT [[id number]]  (Normal) Collected:  11/28/19 0837    Lab Status:  Final result Specimen:  Blood from Arm, Left Updated:  11/28/19 0913     PTT 23 seconds     Comprehensive metabolic panel [[id number]]  (Abnormal) Collected:  11/28/19 0837    Lab Status:  Final result Specimen:  Blood from Arm, Left Updated:  11/28/19 0907     Sodium 140 mmol/L      Potassium 3 1 mmol/L      Chloride 99 mmol/L      CO2 32 mmol/L      ANION GAP 9 mmol/L      BUN 12 mg/dL      Creatinine 0 65 mg/dL      Glucose 247 mg/dL      Calcium 8 8 mg/dL      AST 23 U/L      ALT 19 U/L      Alkaline Phosphatase 130 U/L      Total Protein 7 4 g/dL      Albumin 3 7 g/dL Total Bilirubin 0 24 mg/dL      eGFR 90 ml/min/1 73sq m     Narrative:       National Kid[employer] guidelines for Chronic Kidney Disease (CKD):     Stage 1 with normal or high GFR (GFR > 90 mL/min/1 73 square meters)    Stage 2 Mild CKD (GFR = 60-89 mL/min/1 73 square meters)    Stage 3A Moderate CKD (GFR = 45-59 mL/min/1 73 square meters)    Stage 3B Moderate CKD (GFR = 30-44 mL/min/1 73 square meters)    Stage 4 Severe CKD (GFR = 15-29 mL/min/1 73 square meters)    Stage 5 End Stage CKD (GFR <15 mL/min/1 73 square meters)  Note: GFR calculation is accurate only with a steady state creatinine    Troponin I [[id number]]  (Normal) Collected:  11/28/19 0837    Lab Status:  Final result Specimen:  Blood from Arm, Left Updated:  11/28/19 0905     Troponin I <0 02 ng/mL     Blood gas, arterial [[id number]]  (Abnormal) Collected:  11/28/19 0842    Lab Status:  Final result Specimen:  Blood, Arterial from Radial, Right Updated:  11/28/19 0850     pH, Arterial 7 412     pCO2, Arterial 39 9 mm Hg      pO2, Arterial 282 7 mm Hg      HCO3, Arterial 24 8 mmol/L      Base Excess, Arterial 0 3 mmol/L      O2 Content, Arterial 18 5 mL/dL      O2 HGB,Arterial  98 5 %      SOURCE Radial, Right     LASHAY TEST Yes     Vent Type- AC AC     AC Rate 16     Tidal Volume 450 ml      Inspired Air (FIO2) 100     PEEP 5    CBC and differential [[id number]]  (Abnormal) Collected:  11/28/19 0837    Lab Status:  Final result Specimen:  Blood from Arm, Left Updated:  11/28/19 0845     WBC 15 69 Thousand/uL      RBC 4 50 Million/uL      Hemoglobin 13 1 g/dL      Hematocrit 42 5 %      MCV 94 fL      MCH 29 1 pg      MCHC 30 8 g/dL      RDW 12 4 %      MPV 10 3 fL      Platelets 105 Thousands/uL      nRBC 0 /100 WBCs      Neutrophils Relative 83 %      Immat GRANS % 1 %      Lymphocytes Relative 10 %      Monocytes Relative 6 %      Eosinophils Relative 0 %      Basophils Relative 0 %      Neutrophils Absolute 13 16 Thousands/µL Immature Grans Absolute 0 10 Thousand/uL      Lymphocytes Absolute 1 49 Thousands/µL      Monocytes Absolute 0 91 Thousand/µL      Eosinophils Absolute 0 00 Thousand/µL      Basophils Absolute 0 03 Thousands/µL     Fingerstick Glucose (POCT) [[id number]]  (Abnormal) Collected:  11/28/19 0836    Lab Status:  Final result Updated:  11/28/19 0845     POC Glucose 235 mg/dl     POCT urinalysis dipstick [[id number]]     Lab Status:  No result Specimen:  Urine                  CT head without contrast   Final Result by Palomo Jones DO (11/28 0068)      There is an acute parenchymal hemorrhage identified within the midline of the posterior fossa measuring 3 7 x 2 6 x 3 7 cm  Parenchymal hematoma has an approximate ellipsoid shape measuring   Best estimate of the volume of intracranial hemorrhage    using these measured dimensions (calculated using the formula (AxBxC)/2) is 18 mL  This hemorrhage results in effacement of the basilar cisterns and compression of the 4th ventricle resulting in mild enlargement of the temporal horns of the lateral    ventricles  There is also subarachnoid hemorrhage within the suprasellar cistern and basilar cisterns and intraventricular hemorrhage  Consider CT angiography to evaluate for vascular malformation or ruptured aneurysm  I personally discussed this study with Macario Moncada on 11/28/2019 at 9:17 AM                            Workstation performed: [id number]         XR chest 1 view portable   Final Result by Zander Gamboa MD (11/28 4219)      ET tube in place with tip in the right mainstem bronchus  Repositioning is recommended  Low lung volumes with patchy bibasilar opacities, likely reflecting atelectasis               I personally discussed this study with Dr Macario Moncada on 11/28/2019 at 10:54 AM                Workstation performed: [id number]                    Procedures  ECG 12 Lead Documentation Only  Date/Time: 11/28/2019 8:52 AM  Performed by: Martinez Parks MD  Authorized by: Martinez Parks MD     Indications / Diagnosis:  Unresponsive  ECG reviewed by me, the ED Provider: yes    Patient location:  ED  Interpretation:     Interpretation: normal    Rate:     ECG rate:  111    ECG rate assessment: tachycardic    Rhythm:     Rhythm: sinus tachycardia    Ectopy:     Ectopy: none    QRS:     QRS axis:  Normal  Conduction:     Conduction: normal    ST segments:     ST segments:  Normal  T waves:     T waves: normal      CriticalCare Time  Performed by: Martinez Parks MD  Authorized by: Martinez Parks MD     Critical care provider statement:     Critical care time (minutes):  30    Critical care time was exclusive of:  Separately billable procedures and treating other patients and teaching time    Critical care was necessary to treat or prevent imminent or life-threatening deterioration of the following conditions:  CNS failure or compromise and respiratory failure    Critical care was time spent personally by me on the following activities:  Blood draw for specimens, obtaining history from patient or surrogate, development of treatment plan with patient or surrogate, discussions with consultants, evaluation of patient's response to treatment, examination of patient, interpretation of cardiac output measurements, ordering and performing treatments and interventions, ordering and review of laboratory studies, ordering and review of radiographic studies, re-evaluation of patient's condition and review of old charts    I assumed direction of critical care for this patient from another provider in my specialty: no             ED Course  ED Course as of Nov 28 1831   Thu Nov 28, 2019   [address] CT Head reviewed, last post fossa/brain stem bleed noted, discussed with radiologist; NS paged  6850 Case discussed with Dr Helena Morillo, NS, looked at the CT scan, large post fossa bleed, do not think that this is salvagable  7670 Son called on phone, informed, will come to ER  6424 Lactic acid noted, do not think its sepsis as patient has intracranial bleed, unresponsive, respiratory failure requiring intubation  LACTIC ACID(!!): 5 0   5032 Case discussed with Critical Care, will admit  1007 Family informed about patient's status at bedside  MDM  Number of Diagnoses or Management Options  Altered mental status: new and requires workup  Intracranial hemorrhage [facility]):   Diagnosis management comments: Patient is a 79-year-old female, history of depression, asthma, found unresponsive by the family today morning, unclear time of onset of symptoms, minimal verbal response as per EMS, was moving both sides, intubated on scene, brought to the ER  On arrival, patient is unresponsive, intubated, pupils 2 mm, sluggish, vital signs noted, mild tachycardia, ventilated breath, air entry on both sides, cardiovascular heart sounds of normal, mild tachycardia, abdomen soft nondistended, no peripheral edema, no calf swelling  Differential diagnosis:  Intracranial hemorrhage, metabolic encephalopathy, asthma exacerbation, electrolyte imbalance  Will check labs, including cardiac/sepsis workup, CT head, urine  Amount and/or Complexity of Data Reviewed  Clinical lab tests: reviewed and ordered  Tests in the radiology section of CPT®: ordered and reviewed  Tests in the medicine section of CPT®: ordered and reviewed  Discuss the patient with other providers: yes  Independent visualization of images, tracings, or specimens: yes        Disposition  Final diagnoses:    Altered mental status   Intracranial hemorrhage ([address] )   Respiratory failure ([address] )     Time reflects when diagnosis was documented in both MDM as applicable and the Disposition within this note     Time User Action Codes Description Comment    11/28/2019  8:56 AM Severiano Cobb Add [R41 82] Altered mental status     11/28/2019  9:32 AM Severiano Lomax Add [I62 9] Intracranial hemorrhage ([address] ) 11/28/2019  5:27 PM Bertha Whitman Add [R40 0] Somnolence     11/28/2019  6:31 PM Ren Vides Add [J96 90] Respiratory failure [facility])       ED Disposition     ED Disposition Condition Date/Time Comment    Admit Stable Thu Nov 28, 2019  9:27 AM Case was discussed with Critical Care and the patient's admission status was agreed to be Admission Status: inpatient status to the service of Dr Ira Zee  Follow-up Information    None         Current Discharge Medication List      CONTINUE these medications which have NOT CHANGED    Details   acetaminophen (TYLENOL) 325 mg tablet Take 650 mg by mouth as needed        ALPRAZolam (XANAX) 0 25 mg tablet Take 0 25 mg by mouth 2 (two) times a day as needed for anxiety    Refills: 0      carBAMazepine (TEGretol) 200 mg tablet Take 200 mg by mouth see administration instructions 1/2 tab in the morning, 1 tab at night   Refills: 0      cholecalciferol (VITAMIN D3) 1,000 units tablet Take 1,000 Units by mouth every morning      EPINEPHrine (EPIPEN 2-ANALIA) 0 3 mg/0 3 mL SOAJ Inject as directed      Ferrous Sulfate (IRON) 325 (65 Fe) MG TABS Take 1 tablet by mouth 2 (two) times a day       fluticasone (FLONASE) 50 mcg/act nasal spray 2 sprays into each nostril daily  Qty: 16 g, Refills: 0    Associated Diagnoses: Chronic sinusitis, unspecified location      ipratropium (ATROVENT) 0 06 % nasal spray 2 sprays into each nostril 3 (three) times a day  Qty: 15 mL, Refills: 0    Associated Diagnoses: Chronic sinusitis, unspecified location      levothyroxine (SYNTHROID) 25 mcg tablet Take 1 tablet (25 mcg total) by mouth every morning  Qty: 90 tablet, Refills: 1    Associated Diagnoses: Hypothyroidism, unspecified type      montelukast (SINGULAIR) 10 mg tablet Take 1 tablet (10 mg total) by mouth daily at bedtime  Qty: 30 tablet, Refills: 5    Associated Diagnoses:  Moderate persistent asthma, unspecified whether complicated      naproxen (NAPROSYN) 500 mg tablet Take 1 tablet (500 mg total) by mouth 2 (two) times a day with meals  Qty: 60 tablet, Refills: 0    Associated Diagnoses: Closed fracture distal radius and ulna, left, initial encounter      nystatin (MYCOSTATIN) cream Apply topically 2 (two) times a day  Qty: 30 g, Refills: 0    Associated Diagnoses: Candidiasis      olopatadine HCl (PATADAY) 0 2 % opth drops Administer 1 drop to both eyes daily  Qty: 1 drop, Refills: 0    Associated Diagnoses: Allergic conjunctivitis of both eyes      promethazine (PHENERGAN) 12 5 MG tablet Take 1 tablet (12 5 mg total) by mouth every 6 (six) hours as needed for nausea or vomiting  Qty: 30 tablet, Refills: 0    Comments:   Associated Diagnoses: Closed fracture distal radius and ulna, left, initial encounter      QVAR REDIHALER 80 MCG/ACT inhaler INHALE 2 PUFFS 2 TIMES DAILY  Qty: 10 6 g, Refills: 0    Associated Diagnoses: Chronic sinusitis, unspecified location      rosuvastatin (CRESTOR) 20 MG tablet Take 1 tablet (20 mg total) by mouth daily at bedtime  Qty: 90 tablet, Refills: 1    Associated Diagnoses: Mixed hyperlipidemia      venlafaxine (EFFEXOR-XR) 75 mg 24 hr capsule Take 75 mg by mouth every morning        VENTOLIN  (90 Base) MCG/ACT inhaler INHALE 2 PUFFS EVERY 4 HOURS AS NEEDED FOR WHEEZING  Qty: 18 g, Refills: 1    Associated Diagnoses: Mild intermittent asthma without complication      zoledronic acid (RECLAST) 5 mg/100 mL IV infusion (premix) Infuse 5 mg into a venous catheter once           No discharge procedures on file      ED Provider  Electronically Signed by           Rolando Suggs MD  11/28/19 4143

## 2019-11-28 NOTE — CONSULTS
Consultation - Neurology   Margarette Lundberg 78 y o  female MRN: [id number]  Unit/Bed#: ICU 04 Encounter: [id number]      Assessment/Plan   1)  Acute large cerebellar hemorrhage and extensive subarachnoid hemorrhage - in combination with amount and location of hemorrhage, as well as pt's poor neurologic exam, pt has very low likelihood of neurologic recovery  Prognosis for survival is quite poor  -CTH demonstrates the above  Additionally noted effacement of the basilar cistern compression of the 4th ventricle, resulting in mild enlargement of the temporal horns of the lateral ventricles  -appreciate Neurosurgery, no neurosurgical intervention is indicated with low likelihood of survival   -supportive care and medication management per primary team   -neurology may be available in the future if required for goals of care discussions   -no additional acute neurologic recommendations at this time, please call with questions    History of Present Illness     Reason for Consult / Principal Problem:  Intracranial hemorrhage  Hx and PE limited by: Altered mental status  HPI: Margarette Lundberg is a 78 y o   female with no known past medical history who presents to the [facility] Emergency Department after being found down by family members  In the emergency department, the patient was found to have a massive cerebellar bleed as well as subarachnoid hemorrhage  The patient was intubated in the field  In the emergency department, the patient was then transferred to the ICU after CT findings  Neurosurgery was consulted and stated that the bleed was likely non survival, and therefore the patient was not a surgical candidate  On exam today, the patient is examined off all sedating medications  Unable to obtain review of systems secondary to intubation and mental status  No response to verbal or noxious stimuli  Pupils are fixed at 2 mm bilaterally  No blink to threat  No corneals  No vestibular ocular reflex  Patient does close jaw in response to gag stimulation  Patient with no withdrawal bilateral upper extremities to noxious stimuli, minimal flexor response to noxious stimuli bilateral lower extremities  Remainder of neurological exam as detailed below        Inpatient consult to Neurology  Consult performed by: April Champion PA-C  Consult ordered by: MELVIN Funez          Review of Systems    Historical Information   Past Medical History:   Diagnosis Date    Anemia     Anxiety     Arthritis     Asthma     Braces as ambulation aid     both legs    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    Chronic allergic rhinitis 10/13/2015    Chronic sinusitis 12/14/2015    Concussion     Depression     Environmental and seasonal allergies     Falls     Frequent headaches     Full dentures     GERD (gastroesophageal reflux disease)     occasional    Hyperlipidemia     Irritable bowel syndrome     Muscular dystrophy (HCC)     braces b/l knees to feet    Neuropathy     b/l legs and feet    Osteoporosis     Thyroid nodule 10/16/2015    Upper back pain     between shoulder blades occasionally, lower back kpain    Urinary incontinence     Vitamin D deficiency     Wears dentures     upper and lower     Past Surgical History:   Procedure Laterality Date    CARPAL TUNNEL RELEASE Bilateral     COLONOSCOPY      ESOPHAGOGASTRODUODENOSCOPY      HYSTERECTOMY      KNEE ARTHROSCOPY Right     OOPHORECTOMY      ID OPEN RDL SHAFT FX OPEN RAD/ULN JT DISLOCATE Left 2/14/2019    Procedure: ORIF RADIUS / Deloise Bending;  Surgeon: Amado Johns MD;  Location: [facility] OR;  Service: Orthopedics    WRIST SURGERY Right     pins     Social History   Social History     Substance and Sexual Activity   Alcohol Use No     Social History     Substance and Sexual Activity   Drug Use No     Social History     Tobacco Use   Smoking Status Never Smoker   Smokeless Tobacco Never Used     Family History: non-contributory    Review of previous medical records was completed  Meds/Allergies   Scheduled Meds:  Current Facility-Administered Medications:  chlorhexidine 15 mL Swish & Spit Q12H [address] Gera Beverage, CRNP    famotidine 10 mg Oral BID Gera Beverage, CRNP    hydrALAZINE 10 mg Intravenous Q6H PRN Gera Beverage, CRNP    levalbuterol 1 25 mg Nebulization Q8H PRN Gera Beverage, CRNP    levothyroxine 25 mcg Oral QAM Gera Beverage, CRNP    potassium chloride 40 mEq Oral Once Heather Reyes MD    propofol 5-50 mcg/kg/min Intravenous Titrated Maria L Carlton MD Last Rate: Stopped (11/28/19 1031)     Continuous Infusions:  propofol 5-50 mcg/kg/min Last Rate: Stopped (11/28/19 0917)     PRN Meds: hydrALAZINE    levalbuterol      No Known Allergies    Objective   Vitals:Blood pressure 146/62, pulse 88, temperature 99 6 °F (37 6 °C), temperature source Temporal, resp  rate 16, weight 65 4 kg (144 lb 2 9 oz), SpO2 95 %, not currently breastfeeding  ,Body mass index is 25 95 kg/m²  Intake/Output Summary (Last 24 hours) at 11/28/2019 1406  Last data filed at 11/28/2019 0917  Gross per 24 hour   Intake 1000 47 ml   Output    Net 1000 47 ml       Invasive Devices: Invasive Devices     Peripheral Intravenous Line            Peripheral IV 11/28/19 Left Hand less than 1 day    Peripheral IV 11/28/19 Right Antecubital less than 1 day          Drain            NG/OG/Enteral Tube Nasogastric 16 Fr Right nares less than 1 day          Airway            ETT  6 5 mm less than 1 day                Physical Exam   Constitutional: No distress  Acutely ill-appearing   HENT:   Head: Normocephalic and atraumatic  Right Ear: External ear normal    Left Ear: External ear normal    Mouth/Throat: Oropharynx is clear and moist  No oropharyngeal exudate  Intubated   Eyes: Conjunctivae are normal  Right eye exhibits no discharge  Left eye exhibits no discharge  No scleral icterus  Neck: Normal range of motion  Neck supple     Pulmonary/Chest:   Ventilated, no effort to over breathe the vent   Musculoskeletal: She exhibits no edema or deformity  Skin: Skin is warm and dry  No rash noted  She is not diaphoretic  No erythema  No pallor  Nursing note and vitals reviewed  Neurologic Exam     Mental Status     Did not respond to voice or painful stimuli   No interactions with examiner or environment   Did not follow commands  Did not track       Cranial Nerves     Pupils fixed, nonreactive to mm bilaterally  No gaze deviation or disconjugate gaze noted  Could not assess EOM  Vestibubular ocular reflex not intact  Negative corneals b/l  Face grossly symmetric   Patient closes jaw with attempted gag stimulation  Tongue midline without fasciculations or atrophy       Motor Exam No withdrawal to noxious stimuli bilateral upper extremities  Minimal flexor withdraw to the noxious stimuli bilateral lower extremities     Sensory Exam     No grimace to noxious stimuli x4     Gait, Coordination, and Reflexes     Tremor   Resting tremor: absent    Reflexes   Right plantar: upgoing  Left plantar: equivocal  Right ankle clonus: absent  Left ankle clonus: absent      Lab Results: I have personally reviewed pertinent reports       Recent Results (from the past 24 hour(s))   Fingerstick Glucose (POCT)    Collection Time: 11/28/19  8:36 AM   Result Value Ref Range    POC Glucose 235 (H) 65 - 140 mg/dl   CBC and differential    Collection Time: 11/28/19  8:37 AM   Result Value Ref Range    WBC 15 69 (H) 4 31 - 10 16 Thousand/uL    RBC 4 50 3 81 - 5 12 Million/uL    Hemoglobin 13 1 11 5 - 15 4 g/dL    Hematocrit 42 5 34 8 - 46 1 %    MCV 94 82 - 98 fL    MCH 29 1 26 8 - 34 3 pg    MCHC 30 8 (L) 31 4 - 37 4 g/dL    RDW 12 4 11 6 - 15 1 %    MPV 10 3 8 9 - 12 7 fL    Platelets 350 014 - 666 Thousands/uL    nRBC 0 /100 WBCs    Neutrophils Relative 83 (H) 43 - 75 %    Immat GRANS % 1 0 - 2 %    Lymphocytes Relative 10 (L) 14 - 44 %    Monocytes Relative 6 4 - 12 %    Eosinophils Relative 0 0 - 6 %    Basophils Relative 0 0 - 1 %    Neutrophils Absolute 13 16 (H) 1 85 - 7 62 Thousands/µL    Immature Grans Absolute 0 10 0 00 - 0 20 Thousand/uL    Lymphocytes Absolute 1 49 0 60 - 4 47 Thousands/µL    Monocytes Absolute 0 91 0 17 - 1 22 Thousand/µL    Eosinophils Absolute 0 00 0 00 - 0 61 Thousand/µL    Basophils Absolute 0 03 0 00 - 0 10 Thousands/µL   Protime-INR    Collection Time: 11/28/19  8:37 AM   Result Value Ref Range    Protime 14 1 11 6 - 14 5 seconds    INR 1 08 0 84 - 1 19   APTT    Collection Time: 11/28/19  8:37 AM   Result Value Ref Range    PTT 23 23 - 37 seconds   Comprehensive metabolic panel    Collection Time: 11/28/19  8:37 AM   Result Value Ref Range    Sodium 140 136 - 145 mmol/L    Potassium 3 1 (L) 3 5 - 5 3 mmol/L    Chloride 99 (L) 100 - 108 mmol/L    CO2 32 21 - 32 mmol/L    ANION GAP 9 4 - 13 mmol/L    BUN 12 5 - 25 mg/dL    Creatinine 0 65 0 60 - 1 30 mg/dL    Glucose 247 (H) 65 - 140 mg/dL    Calcium 8 8 8 3 - 10 1 mg/dL    AST 23 5 - 45 U/L    ALT 19 12 - 78 U/L    Alkaline Phosphatase 130 (H) 46 - 116 U/L    Total Protein 7 4 6 4 - 8 2 g/dL    Albumin 3 7 3 5 - 5 0 g/dL    Total Bilirubin 0 24 0 20 - 1 00 mg/dL    eGFR 90 ml/min/1 73sq m   Troponin I    Collection Time: 11/28/19  8:37 AM   Result Value Ref Range    Troponin I <0 02 <=0 04 ng/mL   TSH    Collection Time: 11/28/19  8:37 AM   Result Value Ref Range    TSH 3RD GENERATON 1 891 0 358 - 3 740 uIU/mL   Phosphorus    Collection Time: 11/28/19  8:37 AM   Result Value Ref Range    Phosphorus 3 7 2 3 - 4 1 mg/dL   Magnesium    Collection Time: 11/28/19  8:37 AM   Result Value Ref Range    Magnesium 1 2 (L) 1 6 - 2 6 mg/dL   Lipase    Collection Time: 11/28/19  8:37 AM   Result Value Ref Range    Lipase 86 73 - 393 u/L   Lactic acid, plasma x2    Collection Time: 11/28/19  8:37 AM   Result Value Ref Range    LACTIC ACID 5 0 (HH) 0 5 - 2 0 mmol/L   NT-BNP PRO    Collection Time: 11/28/19  8:37 AM   Result Value Ref Range    NT-proBNP 82 <125 pg/mL   Ethanol    Collection Time: 11/28/19  8:37 AM   Result Value Ref Range    Ethanol Lvl <3 0 - 3 mg/dL   Salicylate level    Collection Time: 11/28/19  8:37 AM   Result Value Ref Range    Salicylate Lvl <3 (L) 3 - 20 mg/dL   Acetaminophen level-If concentration is detectable, please discuss with medical  on call  Collection Time: 11/28/19  8:37 AM   Result Value Ref Range    Acetaminophen Level <2 (L) 10 - 20 ug/mL   Blood gas, arterial    Collection Time: 11/28/19  8:42 AM   Result Value Ref Range    pH, Arterial 7 412 7 350 - 7 450    pCO2, Arterial 39 9 36 0 - 44 0 mm Hg    pO2, Arterial 282 7 (H) 75 0 - 129 0 mm Hg    HCO3, Arterial 24 8 22 0 - 28 0 mmol/L    Base Excess, Arterial 0 3 mmol/L    O2 Content, Arterial 18 5 16 0 - 23 0 mL/dL    O2 HGB,Arterial  98 5 (H) 94 0 - 97 0 %    SOURCE Radial, Right     LASHAY TEST Yes     Vent Type- AC AC     AC Rate 16     Tidal Volume 450 ml    Inspired Air (FIO2) 100     PEEP 5    Lactic acid, plasma x2    Collection Time: 11/28/19 10:34 AM   Result Value Ref Range    LACTIC ACID 5 7 (HH) 0 5 - 2 0 mmol/L   Lactic acid, plasma    Collection Time: 11/28/19 12:49 PM   Result Value Ref Range    LACTIC ACID 5 1 (HH) 0 5 - 2 0 mmol/L   Fingerstick Glucose (POCT)    Collection Time: 11/28/19  2:55 PM   Result Value Ref Range    POC Glucose 135 65 - 140 mg/dl   ]    Imaging Studies: I have personally reviewed pertinent reports  and I have personally reviewed pertinent films in PACS  EKG, Pathology, and Other Studies: I have personally reviewed pertinent reports      VTE Prophylaxis: Sequential compression device (Venodyne)  and Reason for no pharmacologic prophylaxis  [facility]    Code Status: Level 1 - Full Code  Advance Directive and Living Will:      Power of :    POLST:

## 2019-11-28 NOTE — H&P
H&P- Jesu Emmanuel 1950, 78 y o  female MRN: [id number]    Unit/Bed#: ED 17 Encounter: [id number]    Primary Care Provider: Tarun De La Garza DO   Date and time admitted to hospital: 11/28/2019  8:28 AM        * Cerebellar bleed [facility])  Assessment & Plan  Admit to intensive care unit  Monitor neuro checks q 1 hour  Consult Neurology  Maintain systolic blood pressure less than 140 mm of mercury  Case reviewed by Neurosurgery, deemed nonsurgical candidate  Continue mechanical ventilation  Full code until discussion with family    Moderate asthma  Assessment & Plan  Updraft nebulizer treatments as needed  Continue mechanical ventilation    Hypothyroidism  Assessment & Plan  Continue Synthroid  If improvement, recheck TSH      HPI:    Jesu Emmanuel is a 78 y o  female who was found unresponsive by family on her couch at home  EMS was activated, upon their arrival the patient was intubated in the field and transported to [address] [address] for further evaluation treatment  CT scan demonstrates large cerebral bleed  Upon my arrival the patient is intubated and unresponsive  Patient does not respond to sternal rub or verbal stimulation  ED physician Consultation with Dr Radha Richard of neurosurgery indicates that the patient is not a surgical candidate  His opinion is that the patient will not survive this insult  Admit patient to the intensive care unit  She will require greater than 2 midnight stay  Await patient's family arrival for discussion of code status  Review of Systems:  Unable to obtain as the patient is unresponsive        Historical Information   Past Medical History:   Diagnosis Date    Anemia     Anxiety     Arthritis     Asthma     Braces as ambulation aid     both legs    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    Chronic allergic rhinitis 10/13/2015    Chronic sinusitis 12/14/2015    Concussion     Depression     Environmental and seasonal allergies     Falls     Frequent headaches     Full dentures     GERD (gastroesophageal reflux disease)     occasional    Hyperlipidemia     Irritable bowel syndrome     Muscular dystrophy (HCC)     braces b/l knees to feet    Neuropathy     b/l legs and feet    Osteoporosis     Thyroid nodule 10/16/2015    Upper back pain     between shoulder blades occasionally, lower back kpain    Urinary incontinence     Vitamin D deficiency     Wears dentures     upper and lower     Past Surgical History:   Procedure Laterality Date    CARPAL TUNNEL RELEASE Bilateral     COLONOSCOPY      ESOPHAGOGASTRODUODENOSCOPY      HYSTERECTOMY      KNEE ARTHROSCOPY Right     OOPHORECTOMY      SD OPEN RDL SHAFT FX OPEN RAD/ULN JT DISLOCATE Left 2/14/2019    Procedure: ORIF RADIUS / Leveda Nilda;  Surgeon: Will Mesa MD;  Location: [facility];  Service: Orthopedics    WRIST SURGERY Right     pins     Social History   Social History     Substance and Sexual Activity   Alcohol Use No     Social History     Substance and Sexual Activity   Drug Use No     Social History     Tobacco Use   Smoking Status Never Smoker   Smokeless Tobacco Never Used       Family History:   Family History   Problem Relation Age of Onset    Heart disease Mother     Lung disease Mother     Heart disease Father     COPD Sister     Cancer Sister     Asthma Family     Heart disease Family     Diabetes Family     Hypertension Family     Mental illness Family        Medications:  Pertinent medications were reviewed    Current Facility-Administered Medications:  chlorhexidine 15 mL Swish & Spit Q12H [address] Master Ahr, CRNP    hydrALAZINE 10 mg Intravenous Q6H PRN Master Ahr, CRNP    levalbuterol 1 25 mg Nebulization Q8H PRN Master Ahr, CRNP    levothyroxine 25 mcg Oral QAM Master Ahr, CRNP    propofol 5-50 mcg/kg/min Intravenous Titrated Chinedu Schmitt MD Last Rate: Stopped (11/28/19 6025)         No Known Allergies      Vitals:   /76 (BP Location: Right arm) Pulse 92   Temp 98 6 °F (37 °C) (Rectal)   Resp 16   Wt 67 7 kg (149 lb 4 oz)   LMP  (LMP Unknown)   SpO2 100%   BMI 26 86 kg/m²   Body mass index is 26 86 kg/m²  SpO2: 100 %,   SpO2 Activity: At Rest,          Intake/Output Summary (Last 24 hours) at 11/28/2019 1008  Last data filed at 11/28/2019 0915  Gross per 24 hour   Intake 1000 ml   Output    Net 1000 ml     Invasive Devices     Peripheral Intravenous Line            Peripheral IV 11/28/19 Left Hand less than 1 day    Peripheral IV 11/28/19 Right Antecubital less than 1 day          Drain            NG/OG/Enteral Tube Nasogastric 16 Fr Right nares less than 1 day          Airway            ETT  6 5 mm less than 1 day                Physical Exam:  Gen:  Intubated and unresponsive  HEENT:  Atraumatic normocephalic pupils equal round and nonreactive at 2 mm  Sclerae anicteric oral pharynx is intubated  Positive doll's eyes  Neck:  Supple no JVD no lymphadenopathy no bruits auscultated  Trachea midline  Chest:  Respirations per the vent, clear to auscultation anteriorly  Cor:  Regular rate and rhythm 3/6 systolic ejection murmur best heard left sternal border  Abd:  Soft with positive bowel sounds  Ext:  No clubbing cyanosis or edema positive pulses in all extremities  Neuro:  Negative patellar, brachiorad or Babinski reflex  Skin:  Warm dry and intact      Diagnostic Data:  Lab: I have personally reviewed pertinent lab results  ,   CBC:  Results from last 7 days   Lab Units 11/28/19  0837   WBC Thousand/uL 15 69*   HEMOGLOBIN g/dL 13 1   HEMATOCRIT % 42 5   PLATELETS Thousands/uL 263      CMP: Lab Results   Component Value Date    SODIUM 140 11/28/2019    K 3 1 (L) 11/28/2019    CL 99 (L) 11/28/2019    CO2 32 11/28/2019    BUN 12 11/28/2019    CREATININE 0 65 11/28/2019    CALCIUM 8 8 11/28/2019    AST 23 11/28/2019    ALT 19 11/28/2019    ALKPHOS 130 (H) 11/28/2019    EGFR 90 11/28/2019   ,   PT/INR:   Lab Results   Component Value Date    INR 1 08 11/28/2019   ,   Magnesium: No components found for: MAG,  Phosphorous:   Lab Results   Component Value Date    PHOS 3 7 11/28/2019       ABG: Lab Results   Component Value Date    PHART 7 412 11/28/2019    HJT3JQN 39 9 11/28/2019    PO2ART 282 7 (H) 11/28/2019    [id number] 24 8 11/28/2019    BEART 0 3 11/28/2019    SOURCE Radial, Right 11/28/2019   ,     Microbiology:  Blood cultures are pending    Imaging: I have personally reviewed the pertinent imaging studies on the PACS system  Chest x-ray demonstrates a right mainstem intubation  Emergency department personnel have retracted tube 2 cm  Elevated bilateral hemidiaphragm    Radiology read of CT scan of the head: There is an acute parenchymal hemorrhage identified within the midline of the posterior fossa measuring 3 7 x 2 6 x 3 7 cm  Parenchymal hematoma has an approximate ellipsoid shape measuring   Best estimate of the volume of intracranial hemorrhage   using these measured dimensions (calculated using the formula (AxBxC)/2) is 18 mL  This hemorrhage results in effacement of the basilar cisterns and compression of the 4th ventricle resulting in mild enlargement of the temporal horns of the lateral   ventricles      There is also subarachnoid hemorrhage within the suprasellar cistern and basilar cisterns and intraventricular hemorrhage      Consider CT angiography to evaluate for vascular malformation or ruptured aneurysm  Cardiac/EKG/telemetry/Echo:   Results from last 7 days   Lab Units 11/28/19  0837   TROPONIN I ng/mL <0 02   NT-PRO BNP pg/mL 82     Sinus rhythm      VTE Prophylaxis: Sequential compression device Chuckie Meals)     Code Status: Level 1 - Full Code    MELVIN Ramirez    Portions of the record may have been created with voice recognition software  Occasional wrong word or "sound a like" substitutions may have occurred due to the inherent limitations of voice recognition software   Read the chart carefully and recognize, using context, where substitutions have occurred

## 2019-11-29 PROBLEM — E87.3 METABOLIC ALKALOSIS: Status: ACTIVE | Noted: 2019-01-01

## 2019-11-29 PROBLEM — J96.01 ACUTE RESPIRATORY FAILURE WITH HYPOXIA (HCC): Status: ACTIVE | Noted: 2019-01-01

## 2019-11-29 PROBLEM — E87.6 HYPOKALEMIA: Status: ACTIVE | Noted: 2019-01-01

## 2019-11-29 PROBLEM — G93.40 ENCEPHALOPATHY ACUTE: Status: ACTIVE | Noted: 2019-01-01

## 2019-11-29 NOTE — PROGRESS NOTES
Discussed with the critical care CRNP to initiate peripheral Levophed versus Dopamine given pt's tachycardia HR in 130s for severe hypotension refractory to NSS bolus

## 2019-11-29 NOTE — ASSESSMENT & PLAN NOTE
· Continue to monitor neuro checks q 1 hour  · Neurology evaluated the patient and has poor prognosis with occlusion of the 4th ventricle  · Has a 97% mortality rate per neurology  · Neurosurgery was consulted and deemed a non surgical candidate  · Has been hypotensive requiring 4 mcg of Levophed  · Continue mechanical ventilation  · Full code until discussion with family  · No change in neurological exam

## 2019-11-29 NOTE — ASSESSMENT & PLAN NOTE
· Secondary to cerebellar bleed  · No improvement of neurological exam, only response is flexion of bilateral knees

## 2019-11-29 NOTE — PLAN OF CARE
Problem: Potential for Falls  Goal: Patient will remain free of falls  Description  INTERVENTIONS:  - Assess patient frequently for physical needs  -  Identify cognitive and physical deficits and behaviors that affect risk of falls    -  [place] fall precautions as indicated by assessment   - Educate patient/family on patient safety including physical limitations  - Instruct patient to call for assistance with activity based on assessment  - Modify environment to reduce risk of injury  - Consider OT/PT consult to assist with strengthening/mobility  Outcome: Progressing     Problem: Prexisting or High Potential for Compromised Skin Integrity  Goal: Skin integrity is maintained or improved  Description  INTERVENTIONS:  - Identify patients at risk for skin breakdown  - Assess and monitor skin integrity  - Assess and monitor nutrition and hydration status  - Monitor labs   - Assess for incontinence   - Turn and reposition patient  - Assist with mobility/ambulation  - Relieve pressure over bony prominences  - Avoid friction and shearing  - Provide appropriate hygiene as needed including keeping skin clean and dry  - Evaluate need for skin moisturizer/barrier cream  - Collaborate with interdisciplinary team   - Patient/family teaching  - Consider wound care consult   Outcome: Progressing     Problem: NEUROSENSORY - ADULT  Goal: Achieves stable or improved neurological status  Description  INTERVENTIONS  - Monitor and report changes in neurological status  - Monitor vital signs such as temperature, blood pressure, glucose, and any other labs ordered   - Initiate measures to prevent increased intracranial pressure  - Monitor for seizure activity and implement precautions if appropriate      Outcome: Progressing     Problem: CARDIOVASCULAR - ADULT  Goal: Maintains optimal cardiac output and hemodynamic stability  Description  INTERVENTIONS:  - Monitor I/O, vital signs and rhythm  - Monitor for S/S and trends of decreased cardiac output  - Administer and titrate ordered vasoactive medications to optimize hemodynamic stability  - Assess quality of pulses, skin color and temperature  - Assess for signs of decreased coronary artery perfusion  - Instruct patient to report change in severity of symptoms  Outcome: Progressing     Problem: METABOLIC, FLUID AND ELECTROLYTES - ADULT  Goal: Electrolytes maintained within normal limits  Description  INTERVENTIONS:  - Monitor labs and assess patient for signs and symptoms of electrolyte imbalances  - Administer electrolyte replacement as ordered  - Monitor response to electrolyte replacements, including repeat lab results as appropriate  - Instruct patient on fluid and nutrition as appropriate  Outcome: Progressing  Goal: Glucose maintained within target range  Description  INTERVENTIONS:  - Monitor Blood Glucose as ordered  - Assess for signs and symptoms of hyperglycemia and hypoglycemia  - Administer ordered medications to maintain glucose within target range  - Assess nutritional intake and initiate nutrition service referral as needed  Outcome: Progressing

## 2019-11-29 NOTE — PROGRESS NOTES
Discussed with critical care CRNP, no longer a need to trend lactic acid  Last value trended down to 5 1 from 5 7 at 12:30

## 2019-11-29 NOTE — SOCIAL WORK
CM spoke with pt's daughter in [place] to introduce CM role and begin discharge planning  Pt resided in half of a double home with her grandson living in the second half  Pt's emergency contacts are her daughter in [place] ([phone]), her son, Naomie Thomas ([phone]), and Dwain Acosta ([phone])  Pt does not have a designated POA  Sean reports that pt was independent with ADLs PTA, sometimes used a cane to ambulate  Pt reports no history of VNA, STR, inpatient MH treatment or D/A treatment  Pt was driving and is retired  PCP is Dr Amanuel Lemus ([phone])  CM reviewed d/c planning process including the following: identifying help at home, patient preference for d/c planning needs, Discharge Lounge, [facility] Meds to Bed program, availability of treatment team to discuss questions or concerns patient and/or family may have regarding understanding medications and recognizing signs and symptoms once discharged  CM also encouraged patient to follow up with all recommended appointments after discharge  Patient advised of importance for patient and family to participate in managing patients medical well being

## 2019-11-29 NOTE — ASSESSMENT & PLAN NOTE
· Requiring mechanical ventilation secondary to encephalopathy due to cerebellar bleed  · Will continue on mechanical ventilation

## 2019-11-29 NOTE — PROGRESS NOTES
Progress Note - Kolton Perez 1950, 78 y o  female MRN: [id number]    Unit/Bed#: ICU 04 Encounter: [id number]    Primary Care Provider: Kulwinder Rodriguez DO   Date and time admitted to hospital: 11/28/2019  8:28 AM        * Cerebellar bleed ([facility] [address] )  Assessment & Plan  · Continue to monitor neuro checks q 1 hour  · Neurology evaluated the patient and has poor prognosis with occlusion of the 4th ventricle  · Has a 97% mortality rate per neurology  · Neurosurgery was consulted and deemed a non surgical candidate  · Has been hypotensive requiring 4 mcg of Levophed  · Continue mechanical ventilation  · Full code until discussion with family  · No change in neurological exam    Acute respiratory failure with hypoxia ([address] )  Assessment & Plan  · Requiring mechanical ventilation secondary to encephalopathy due to cerebellar bleed  · Will continue on mechanical ventilation    Hypothyroidism  Assessment & Plan  Continue Synthroid  If improvement, recheck TSH    Encephalopathy acute  Assessment & Plan  · Secondary to cerebellar bleed  · No improvement of neurological exam, only response is flexion of bilateral knees    Metabolic alkalosis  Assessment & Plan  · Changed vent settings  · Will repeat ABG     Moderate asthma  Assessment & Plan  · Updraft nebulizer treatments as needed  · Continue mechanical ventilation    Hypokalemia  Assessment & Plan  · Replete as needed      ----------------------------------------------------------------------------------------  HPI/24hr events: Has remained on Levophed at 4 mcg/min overnight  There has been no neurological improvement  Continues with flexion of bilateral knees with painful stimuli       Disposition: Continue Critical Care   Code Status: Level 1 - Full Code  ---------------------------------------------------------------------------------------  SUBJECTIVE  Intubated and unresponsive    Review of Systems   Unable to perform ROS: Intubated     Review of systems was reviewed and negative unless stated above in HPI/24-hour events   ---------------------------------------------------------------------------------------  OBJECTIVE    Physical Exam   Constitutional: She appears well-developed and well-nourished  HENT:   Head: Normocephalic and atraumatic  Nose: Nose normal    Mouth/Throat: Oropharynx is clear and moist    Eyes:   Pupils 3 and non-reactive to light     Neck: Normal range of motion  Neck supple  No JVD present  Cardiovascular: Normal rate, regular rhythm and normal heart sounds  Exam reveals no gallop and no friction rub  No murmur heard  Pulmonary/Chest: Effort normal and breath sounds normal  No respiratory distress  6 5 ET tube   Abdominal: Soft  Bowel sounds are normal  She exhibits no distension  There is no tenderness  There is no guarding  Musculoskeletal: She exhibits no edema  Lymphadenopathy:     She has no cervical adenopathy  Neurological:   GCS 3T  No pupil response  No corneal reflex  No cough or gag  Able to initiate breaths over the ventilator  Bilateral knee flexion to painful stimuli   Skin: Skin is warm and dry  Vitals reviewed        Vitals   Vitals:    19 0400 19 0500 19 0534 19 0600   BP: 114/64 103/57  134/70   Pulse: 64 70  68   Resp: 15 16  12   Temp:       TempSrc:       SpO2: 96% 94%  94%   Weight:   65 4 kg (144 lb 2 9 oz)      Temp (24hrs), Av °F (37 2 °C), Min:97 3 °F (36 3 °C), Max:101 4 °F (38 6 °C)  Current: Temperature: 97 8 °F (36 6 °C)          Invasive/non-invasive ventilation settings   Respiratory    Lab Data (Last 4 hours)       0559            pH, Arterial       7 541           pCO2, Arterial       28 8           pO2, Arterial       106 8           HCO3, Arterial       24 1           Base Excess, Arterial       2 4                O2/Vent Data           Most Recent         Vent Mode   AC/VC      Resp Rate (BPM) (BPM)   12      Vt (mL) (mL)   420      FIO2 (%) (%)   50      PEEP (cmH2O) (cmH2O)   5      MV   9 75                  Height and Weights      IBW: -92 5 kg  Body mass index is 25 95 kg/m²  Weight (last 2 days)     Date/Time   Weight    11/29/19 0534   65 4 (144 18)    11/28/19 1200   65 4 (144 18)    11/28/19 0950   65 4 (144 18)    11/28/19 0832   67 7 (149 25)                Intake and Output  I/O       11/27 0701 - 11/28 0700 11/28 0701 - 11/29 0700    I V  (mL/kg)  91 5 (1 4)    IV Piggyback  1000    Total Intake(mL/kg)  1091 5 (16 7)    Urine (mL/kg/hr)  510    Total Output  510    Net  +581 5                Nutrition       Diet Orders   (From admission, onward)             Start     Ordered    11/28/19 0948  Diet NPO  Diet effective now     Question Answer Comment   Diet Type NPO    RD to adjust diet per protocol?  No        11/28/19 0949                Laboratory and Diagnostics:  Results from last 7 days   Lab Units 11/29/19  0538 11/28/19  1724 11/28/19  0837   WBC Thousand/uL 13 78*  --  15 69*   HEMOGLOBIN g/dL 11 4*  --  13 1   I STAT HEMOGLOBIN g/dl  --  12 6  --    HEMATOCRIT % 34 9  --  42 5   HEMATOCRIT, ISTAT %  --  37  --    PLATELETS Thousands/uL 201  --  263   NEUTROS PCT %  --   --  83*   MONOS PCT %  --   --  6     Results from last 7 days   Lab Units 11/29/19  0527 11/28/19  1724 11/28/19  0837   SODIUM mmol/L 143  --  140   POTASSIUM mmol/L 3 7  --  3 1*   CHLORIDE mmol/L 108  --  99*   CO2 mmol/L 26  --  32   CO2, I-STAT mmol/L  --  26  --    ANION GAP mmol/L 9  --  9   BUN mg/dL 20  --  12   CREATININE mg/dL 0 76  --  0 65   CALCIUM mg/dL 8 2*  --  8 8   GLUCOSE RANDOM mg/dL 142*  --  247*   ALT U/L  --   --  19   AST U/L  --   --  23   ALK PHOS U/L  --   --  130*   ALBUMIN g/dL  --   --  3 7   TOTAL BILIRUBIN mg/dL  --   --  0 24     Results from last 7 days   Lab Units 11/28/19  0837   MAGNESIUM mg/dL 1 2*   PHOSPHORUS mg/dL 3 7      Results from last 7 days   Lab Units 11/28/19  0837   INR  1 08   PTT seconds 23      Results from last 7 days   Lab Units 11/28/19  0837   TROPONIN I ng/mL <0 02     Results from last 7 days   Lab Units 11/28/19  1249 11/28/19  1034 11/28/19  0837   LACTIC ACID mmol/L 5 1* 5 7* 5 0*     ABG:  Results from last 7 days   Lab Units 11/29/19  0559   PH ART  7 541*   PCO2 ART mm Hg 28 8*   PO2 ART mm Hg 106 8   HCO3 ART mmol/L 24 1   BASE EXC ART mmol/L 2 4   ABG SOURCE  Radial, Right     VBG:  Results from last 7 days   Lab Units 11/29/19  0559   ABG SOURCE  Radial, Right           Micro  Results from last 7 days   Lab Units 11/28/19  0837 11/28/19  0830   BLOOD CULTURE  Received in Microbiology Lab  Culture in Progress  Received in Microbiology Lab  Culture in Progress  EKG:    Imaging: No new imaging    Active Medications  Scheduled Meds:    Current Facility-Administered Medications:  acetaminophen 650 mg Oral Q4H PRN YASIR MeloNP    chlorhexidine 15 mL Swish & Spit Q12H [address] MELVIN Melo    famotidine 10 mg Oral BID MELVIN Melo    hydrALAZINE 20 mg Intravenous Q6H PRN MELVIN Melo    levalbuterol 1 25 mg Nebulization Q8H PRN MELVIN Melo    levothyroxine 25 mcg Oral QAM MELVIN Melo    norepinephrine 1-30 mcg/min Intravenous Titrated MELVIN Melo Last Rate: 4 mcg/min (11/28/19 2300)     Continuous Infusions:    norepinephrine 1-30 mcg/min Last Rate: 4 mcg/min (11/28/19 2300)     PRN Meds:     acetaminophen 650 mg Q4H PRN   hydrALAZINE 20 mg Q6H PRN   levalbuterol 1 25 mg Q8H PRN       ---------------------------------------------------------------------------------------  Advance Directive and Living Will:      Power of :    POLST:    ---------------------------------------------------------------------------------------      MELVIN Cedeno        Portions of the record may have been created with voice recognition software  Occasional wrong word or "sound a like" substitutions may have occurred due to the inherent limitations of voice recognition software  Read the chart carefully and recognize, using context, where substitutions have occurred

## 2019-11-29 NOTE — SOCIAL WORK
CM attempted to call pt's first emergency contact/daughter in law, Nima Calderon ([phone]) to introduce CM role and gather baseline information on pt  Voicemail was left, awaiting call back

## 2019-11-30 PROBLEM — S52.602A CLOSED FRACTURE OF LEFT DISTAL RADIUS AND ULNA: Status: RESOLVED | Noted: 2019-01-01 | Resolved: 2019-01-01

## 2019-11-30 PROBLEM — S52.502A CLOSED FRACTURE OF LEFT DISTAL RADIUS AND ULNA: Status: RESOLVED | Noted: 2019-01-01 | Resolved: 2019-01-01

## 2019-11-30 PROBLEM — S06.0X9A CONCUSSION: Status: RESOLVED | Noted: 2018-07-12 | Resolved: 2019-01-01

## 2019-11-30 PROBLEM — G93.40 ENCEPHALOPATHY ACUTE: Status: RESOLVED | Noted: 2019-01-01 | Resolved: 2019-01-01

## 2019-11-30 PROBLEM — R42 DIZZINESS: Status: RESOLVED | Noted: 2018-07-11 | Resolved: 2019-01-01

## 2019-11-30 PROBLEM — J45.909 MODERATE ASTHMA: Status: RESOLVED | Noted: 2019-01-01 | Resolved: 2019-01-01

## 2019-11-30 PROBLEM — J96.01 ACUTE RESPIRATORY FAILURE WITH HYPOXIA (HCC): Status: RESOLVED | Noted: 2019-01-01 | Resolved: 2019-01-01

## 2019-11-30 PROBLEM — I61.9 NONTRAUMATIC INTRACEREBRAL HEMORRHAGE (HCC): Status: ACTIVE | Noted: 2019-01-01

## 2019-11-30 PROBLEM — R73.03 PREDIABETES: Status: RESOLVED | Noted: 2017-12-27 | Resolved: 2019-01-01

## 2019-11-30 PROBLEM — J32.9 CHRONIC SINUSITIS: Status: RESOLVED | Noted: 2017-08-24 | Resolved: 2019-01-01

## 2019-11-30 PROBLEM — E04.1 THYROID NODULE: Status: RESOLVED | Noted: 2017-12-27 | Resolved: 2019-01-01

## 2019-11-30 PROBLEM — G60.0 CHARCOT-MARIE DISEASE: Status: RESOLVED | Noted: 2018-07-13 | Resolved: 2019-01-01

## 2019-11-30 PROBLEM — J45.40 MODERATE PERSISTENT ASTHMA WITHOUT COMPLICATION: Status: RESOLVED | Noted: 2018-07-13 | Resolved: 2019-01-01

## 2019-11-30 PROBLEM — E87.6 HYPOKALEMIA: Status: RESOLVED | Noted: 2019-01-01 | Resolved: 2019-01-01

## 2019-11-30 PROBLEM — I61.4 CEREBELLAR BLEED (HCC): Status: RESOLVED | Noted: 2019-01-01 | Resolved: 2019-01-01

## 2019-11-30 PROBLEM — R09.02 HYPOXIA: Status: RESOLVED | Noted: 2019-01-01 | Resolved: 2019-01-01

## 2019-11-30 NOTE — PROGRESS NOTES
Neurology follow-up note (time of exam at 9:00 a m )  Following patient for acute posterior fossa ICH with intraventricular extension, poorly responsive (ICH grade 4)  Patient remains unresponsive, unable to contribute any history  On examination, vital signs reviewed  Low blood pressures, supported with neuropathy current, last 86/58  Tachycardic  Respiratory rate at at vent settings  Mental status:  She does not respond to verbal or noxious stimulus  Does not follow any commands  No eye opening  Cranial nerves:  Pupils are 3 5 mm on the right, 3 on the left, nonreactive to light  Gaze is conjugate  Oculocephalic reflex absent  Oculovestibular reflex (cold caloric) absent  Corneal reflexes absent  Gag reflex absent  No grimace to pain or nasal tickle  Motor exam:  No spontaneous movements  Hypotonic throughout  No reaction to nail bed pressure in bilateral upper extremities  In the lowers she has flexor posturing bilaterally to pain  No involuntary movements  Respiratory:  Yesterday's critical care note indicates that she was initiating breaths over the vent rate  Non witnessed during today's exam     She has not received any sedation in over 24 hours  Her laboratory studies showed no significant electrolyte derangement to account for her encephalopathy  Impression/recommendation:  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

## 2019-11-30 NOTE — PROGRESS NOTES
Pastoral Care Progress Note    2019  Patient: Seema Lawson : 1950  Admission Date & Time: 2019 1466  MRN: [id number] [facility]: [id number]                     Chaplaincy Interventions Utilized:   Empowerment: Provided anticipatory guidance, Provided anxiety containment and Provided grief counseling    Exploration: Explored spiritual needs & resources and Facilitated story telling    Collaboration: Consulted with interdisciplinary team    Relationship Building: Cultivated a relationship of care and support    Ritual: Facilitated postmortem needs/rituals, Provided prayer and Provided ritual    Chaplaincy Outcomes Achieved:  Distress reduced, Expressed gratitude, Expressed peace, Made decisions, Spiritual resources utilized and Verbally processed emotions    Spiritual Coping Strategies Utilized:   Spiritual comfort, Spiritual gratitude and Surrender       [address] [facility]

## 2019-11-30 NOTE — SOCIAL WORK
Met with the patients family, they are upset because they would like to make decisions on behalf of the patient, however, as to everyone's knowledge, the patient does not have a legal POA  She has two children, Liv Rich and Sharon, who would have equal say in making medical decisions on behalf of the patient  They were able to get taraold of Sharon, who reports she will be in at some point today  CM following

## 2019-11-30 NOTE — PROGRESS NOTES
Progress Note - Griffin Patel 1950, 78 y o  female MRN: [id number]    Unit/Bed#: ICU 04 Encounter: [id number]    Primary Care Provider: Scar Ballard DO   Date and time admitted to hospital: 11/28/2019  8:28 AM        * Cerebellar bleed ([address] )  Assessment & Plan  · Continue to monitor neuro checks q 1 hour  · Neurology evaluated the patient and has poor prognosis with occlusion of the 4th ventricle  · Has a 97% mortality rate per neurology  · Neurosurgery was consulted and deemed a non surgical candidate  · Has been hypotensive requiring Levophed  · Continue mechanical ventilation  · Patient now DNR as per Son  · No change in neurological exam    Acute respiratory failure with hypoxia ([facility])  Assessment & Plan  · Requiring mechanical ventilation secondary to encephalopathy due to cerebellar bleed  · Will continue on mechanical ventilation  · Family discussions regarding goals of care today  Encephalopathy acute  Assessment & Plan  · Secondary to cerebellar bleed  · No improvement of neurological exam  · Patient remains Areflexic    Hypothyroidism  Assessment & Plan  · Continue Synthroid      Metabolic alkalosis  Assessment & Plan  · Continue to monitor metabolic status    Moderate asthma  Assessment & Plan  · No exacerbation  · Updraft nebulizer treatments as needed  · Continue mechanical ventilation    Hypokalemia  Assessment & Plan  · Replete as needed    ----------------------------------------------------------------------------------------  HPI/24hr events: No events overnight  Patient remains unresponsive and dependent on the ventilator  Disposition: Continue Critical Care   Code Status: Level 3 - DNAR and DNI  ---------------------------------------------------------------------------------------  SUBJECTIVE  No complaints offered  Review of Systems   Constitutional: Negative  HENT: Negative  Eyes: Negative  Respiratory: Negative  Cardiovascular: Negative      Gastrointestinal: Negative  Endocrine: Negative  Genitourinary: Negative  Musculoskeletal: Negative  Allergic/Immunologic: Negative  Neurological:        Unresponsive to verbal and tactile stimuli  Hematological: Negative  Psychiatric/Behavioral: Negative         ---------------------------------------------------------------------------------------  OBJECTIVE    Physical Exam   Constitutional: She appears well-developed  HENT:   Head: Normocephalic and atraumatic  Eyes:   Pupils 3 and non reactive   Neck: Neck supple  Cardiovascular: Normal rate and regular rhythm  Pulmonary/Chest: Breath sounds normal    Abdominal: Soft  Bowel sounds are normal    Musculoskeletal: She exhibits edema  Neurological: She displays abnormal reflex  She exhibits abnormal muscle tone  Skin: Skin is warm and dry  Capillary refill takes less than 2 seconds  Vitals   Vitals:    19 0400 19 0500 19 0524 19 0600   BP: 106/66 (!) 83/58 (!) 79/52 92/61   Pulse: (!) 112 (!) 108 (!) 106 (!) 112   Resp: 13 12 12 14   Temp: 97 9 °F (36 6 °C) 98 2 °F (36 8 °C) 98 2 °F (36 8 °C) 98 2 °F (36 8 °C)   TempSrc:       SpO2: 95% 94% 94% 94%   Weight:         Temp (24hrs), Av 1 °F (35 6 °C), Min:92 5 °F (33 6 °C), Max:98 6 °F (37 °C)  Current: Temperature: 98 2 °F (36 8 °C)          Invasive/non-invasive ventilation settings   Respiratory    Lab Data (Last 4 hours)    None         O2/Vent Data (Last 4 hours)       0302           Vent Mode AC/VC       Resp Rate (BPM) (BPM) 12       Vt (mL) (mL) 420       FIO2 (%) (%) 40       PEEP (cmH2O) (cmH2O) 5       MV 6 27                   Height and Weights      IBW: -92 5 kg  Body mass index is 25 95 kg/m²    Weight (last 2 days)     Date/Time   Weight    19 0534   65 4 (144 18)    19 1200   65 4 (144 18)    19 0950   65 4 (144 18)    19 0832   67 7 (149 25)                Intake and Output  I/O        0701 -  0700  07 - 11/30 0700    I V  (mL/kg) 211 5 (3 2) 225 1 (3 4)    IV Piggyback 1000     Total Intake(mL/kg) 1211 5 (18 5) 225 1 (3 4)    Urine (mL/kg/hr) 710 604 (0 4)    Total Output 710 604    Net +501 5 -378 9          Unmeasured Urine Occurrence  1 x          Nutrition       Diet Orders   (From admission, onward)             Start     Ordered    11/28/19 0948  Diet NPO  Diet effective now     Question Answer Comment   Diet Type NPO    RD to adjust diet per protocol?  No        11/28/19 0949                Laboratory and Diagnostics:  Results from last 7 days   Lab Units 11/29/19  0538 11/28/19  1724 11/28/19  0837   WBC Thousand/uL 13 78*  --  15 69*   HEMOGLOBIN g/dL 11 4*  --  13 1   I STAT HEMOGLOBIN g/dl  --  12 6  --    HEMATOCRIT % 34 9  --  42 5   HEMATOCRIT, ISTAT %  --  37  --    PLATELETS Thousands/uL 201  --  263   NEUTROS PCT %  --   --  83*   MONOS PCT %  --   --  6     Results from last 7 days   Lab Units 11/29/19  0527 11/28/19  1724 11/28/19  0837   SODIUM mmol/L 143  --  140   POTASSIUM mmol/L 3 7  --  3 1*   CHLORIDE mmol/L 108  --  99*   CO2 mmol/L 26  --  32   CO2, I-STAT mmol/L  --  26  --    ANION GAP mmol/L 9  --  9   BUN mg/dL 20  --  12   CREATININE mg/dL 0 76  --  0 65   CALCIUM mg/dL 8 2*  --  8 8   GLUCOSE RANDOM mg/dL 142*  --  247*   ALT U/L  --   --  19   AST U/L  --   --  23   ALK PHOS U/L  --   --  130*   ALBUMIN g/dL  --   --  3 7   TOTAL BILIRUBIN mg/dL  --   --  0 24     Results from last 7 days   Lab Units 11/28/19  0837   MAGNESIUM mg/dL 1 2*   PHOSPHORUS mg/dL 3 7      Results from last 7 days   Lab Units 11/28/19  0837   INR  1 08   PTT seconds 23      Results from last 7 days   Lab Units 11/28/19  0837   TROPONIN I ng/mL <0 02     Results from last 7 days   Lab Units 11/28/19  1249 11/28/19  1034 11/28/19  0837   LACTIC ACID mmol/L 5 1* 5 7* 5 0*     ABG:  Results from last 7 days   Lab Units 11/29/19  0559   PH ART  7 541*   PCO2 ART mm Hg 28 8*   PO2 ART mm Hg 106 8   HCO3 ART mmol/L 24 1   BASE EXC ART mmol/L 2 4   ABG SOURCE  Radial, Right     VBG:  Results from last 7 days   Lab Units 11/29/19  0559   ABG SOURCE  Radial, Right           Micro  Results from last 7 days   Lab Units 11/28/19  0837 11/28/19  0830   BLOOD CULTURE  No Growth at 24 hrs  No Growth at 24 hrs  Imaging:   CT head without contrast   Final Result      There is an acute parenchymal hemorrhage identified within the midline of the posterior fossa measuring 3 7 x 2 6 x 3 7 cm  Parenchymal hematoma has an approximate ellipsoid shape measuring   Best estimate of the volume of intracranial hemorrhage    using these measured dimensions (calculated using the formula (AxBxC)/2) is 18 mL  This hemorrhage results in effacement of the basilar cisterns and compression of the 4th ventricle resulting in mild enlargement of the temporal horns of the lateral    ventricles  There is also subarachnoid hemorrhage within the suprasellar cistern and basilar cisterns and intraventricular hemorrhage  Consider CT angiography to evaluate for vascular malformation or ruptured aneurysm  I personally discussed this study with Lesia Molina on 11/28/2019 at 9:17 AM                            Workstation performed: [id number]         XR chest 1 view portable   Final Result      ET tube in place with tip in the right mainstem bronchus  Repositioning is recommended  Low lung volumes with patchy bibasilar opacities, likely reflecting atelectasis               I personally discussed this study with Dr Lesia Molina on 11/28/2019 at 10:54 AM                Workstation performed: [id number]               Active Medications  Scheduled Meds:    Current Facility-Administered Medications:  acetaminophen 650 mg Oral Q4H PRN Clydia Bloodgood, CRNP    chlorhexidine 15 mL Swish & Spit Q12H [address] Clydia Bloodgood, CRNP    famotidine 10 mg Oral BID Clydia Bloodgood, CRNP    hydrALAZINE 20 mg Intravenous Q6H PRN Clydia Bloodgood, CRNP levalbuterol 1 25 mg Nebulization Q8H PRN [place] MELVIN Sanders    levothyroxine 25 mcg Oral QAM MELVIN Garcia    norepinephrine 1-30 mcg/min Intravenous Titrated MELVIN Garcia Last Rate: 8 mcg/min (11/30/19 0525)     Continuous Infusions:    norepinephrine 1-30 mcg/min Last Rate: 8 mcg/min (11/30/19 0525)     PRN Meds:     acetaminophen 650 mg Q4H PRN   hydrALAZINE 20 mg Q6H PRN   levalbuterol 1 25 mg Q8H PRN         ---------------------------------------------------------------------------------------  Counseling / Coordination of Care  Total Critical Care time spent 40 minutes excluding procedures, teaching and family updates  MELVIN Tong        Portions of the record may have been created with voice recognition software  Occasional wrong word or "sound a like" substitutions may have occurred due to the inherent limitations of voice recognition software    Read the chart carefully and recognize, using context, where substitutions have occurred

## 2019-11-30 NOTE — ASSESSMENT & PLAN NOTE
· Continue to monitor neuro checks q 1 hour  · Neurology evaluated the patient and has poor prognosis with occlusion of the 4th ventricle  · Has a 97% mortality rate per neurology  · Neurosurgery was consulted and deemed a non surgical candidate  · Has been hypotensive requiring Levophed  · Continue mechanical ventilation  · Patient now DNR as per Son    · No change in neurological exam

## 2019-11-30 NOTE — ASSESSMENT & PLAN NOTE
Discussion with Neurosurgery:  Patient will not survive this bleed  Consult Neurology  Hold anticoagulants  Discuss code status with family

## 2019-11-30 NOTE — DISCHARGE SUMMARY
Discharge- Lele Pierce 1950, 78 y o  female MRN: [id number]    Unit/Bed#: ICU 04 Encounter: [id number]    Primary Care Provider: Nestor Buchanan DO   Date and time admitted to hospital: 2019  8:28 AM        Metabolic alkalosis  Assessment & Plan  · Continue to monitor metabolic status    Depression  Assessment & Plan      Anemia  Assessment & Plan      * Nontraumatic intracerebral hemorrhage [facility])  Assessment & Plan  Discussion with Neurosurgery:  Patient will not survive this bleed  Consult Neurology  Hold anticoagulants  Discuss code status with family      Discharge Summary   Lele Pierce 78 y o  female MRN: [id number]  Unit/Bed#: ICU 04 Encounter: [id number]    Admission Date: 2019     Discharge Date: 19    Admitting Diagnoses:   1  Cerebral bleed  2  Acute respiratory failure with hypoxia  3  Encephalopathy  4   Hypothyroid  5  Metabolic alkalosis  6  Asthma  7  Hypokalemia    Discharge Diagnoses:  1  Cerebral bleed  2  Acute respiratory failure with hypoxia  3  Cephalopathy  4  Hypothyroid  5  Metabolic alkalosis  6  Asthma  7  Hyperkalemia      Procedures Performed:   Orders Placed This Encounter   Procedures   [employer] ED ECG Documentation Only       Hospital Course:   Patient was found at home unresponsive on her couch  She was transported to [place] [address] emergency department for further evaluation and treatment  Emergent CT scan of her head revealed a significant cerebral bleed  Neurosurgery was consulted, and the patient was deemed a nonsurgical candidate  It is felt that the patient would not survive this bleed  Patient was admitted to the intensive care unit intubated and unresponsive  Grave prognosis was discussed with the family  On 2019 the patient's family elected to change the patient's code status to comfort care, and allow the patient to pass comfortably  Patient pass with family at bedside      Significant Findings, Care, Treatment and Services Provided:   Cerebral bleed    Complications:   Acute respiratory failure with hypoxia    Condition at Discharge:      Discharge instructions/Information to patient and family:   See after visit summary for information provided to patient and family  Provisions for Follow-Up Care:  See after visit summary for information related to follow-up care and any pertinent home health orders  Disposition:     Discharge Statement   I spent 30 minutes discharging the patient  This time was spent on the day of discharge  I had direct contact with the patient on the day of discharge  Additional documentation is required if more than 30 minutes were spent on discharge  Discharge Medications:  See after visit summary for reconciled discharge medications provided to patient and family

## 2019-11-30 NOTE — ASSESSMENT & PLAN NOTE
· Requiring mechanical ventilation secondary to encephalopathy due to cerebellar bleed  · Will continue on mechanical ventilation  · Family discussions regarding goals of care today

## 2019-11-30 NOTE — PROGRESS NOTES
Death Note  Carlos Robert 78 y o  female MRN: [id number]  Unit/Bed#: ICU 04 Encounter: [id number]      Situation:  Called to bedside to pronounce death  Background:  Carlos Robert is a 78y o  year old female who presented to the hospital after found home unresponsive  She was transported to [facility] emergency department for further evaluation treatment of found to have a hemorrhagic CVA  Neuro surgery deemed her a nonsurgical candidate  It was expected that she would not survive this insult  Patient was admitted to the intensive care unit  Family elected to make the patient comfort care on 2019  The patient  quietly with family surrounding her at 4:50 p m    Historical Information   Past Medical History:   Diagnosis Date    Anemia     Anxiety     Arthritis     Asthma     Braces as ambulation aid     both legs    Charcot-Rashida-Tooth disease 2015    Chronic allergic rhinitis 10/13/2015    Chronic sinusitis 2015    Concussion     Depression     Environmental and seasonal allergies     Falls     Frequent headaches     Full dentures     GERD (gastroesophageal reflux disease)     occasional    Hyperlipidemia     Irritable bowel syndrome     Muscular dystrophy (HCC)     braces b/l knees to feet    Neuropathy     b/l legs and feet    Osteoporosis     Thyroid nodule 10/16/2015    Upper back pain     between shoulder blades occasionally, lower back kpain    Urinary incontinence     Vitamin D deficiency     Wears dentures     upper and lower     Past Surgical History:   Procedure Laterality Date    CARPAL TUNNEL RELEASE Bilateral     COLONOSCOPY      ESOPHAGOGASTRODUODENOSCOPY      HYSTERECTOMY      KNEE ARTHROSCOPY Right     OOPHORECTOMY      IN OPEN RDL SHAFT FX OPEN RAD/ULN JT DISLOCATE Left 2019    Procedure: ORIF RADIUS / Rebecca Boom;  Surgeon: Gordon Mejía MD;  Location: [facility];  Service: Orthopedics    WRIST SURGERY Right     pins Assessment:  Called to patients bedside to pronounce that patient dead  No spontaneous movements were present  There was not response to verbal or tactile stimuli  Pupils were dilated and fixed  No breath sounds were appreciated over either lung field  No carotid pulses were palpable  No heart sounds were auscultator over entire precordium       Meds/Allergies     Current Facility-Administered Medications:     morphine injection 2 mg, 2 mg, Intravenous, Q4H PRN, 2 mg at 11/30/19 1636  No Known Allergies    Recommendation:  Patient pronounced dead at 16:50  Family and Attending physician were notified  Yovani and postmortem services offered to the family  The family declines organ donation  Patients major medical illness was hemorrhagic CVA  Time of death was 16:50  Code Status: Level 4 - Comfort Care at time of death

## 2019-11-30 NOTE — PLAN OF CARE
Problem: Potential for Falls  Goal: Patient will remain free of falls  Description  INTERVENTIONS:  - Assess patient frequently for physical needs  -  Identify cognitive and physical deficits and behaviors that affect risk of falls    -  [place] fall precautions as indicated by assessment   - Educate patient/family on patient safety including physical limitations  - Instruct patient to call for assistance with activity based on assessment  - Modify environment to reduce risk of injury  - Consider OT/PT consult to assist with strengthening/mobility  Outcome: Progressing     Problem: Prexisting or High Potential for Compromised Skin Integrity  Goal: Skin integrity is maintained or improved  Description  INTERVENTIONS:  - Identify patients at risk for skin breakdown  - Assess and monitor skin integrity  - Assess and monitor nutrition and hydration status  - Monitor labs   - Assess for incontinence   - Turn and reposition patient  - Assist with mobility/ambulation  - Relieve pressure over bony prominences  - Avoid friction and shearing  - Provide appropriate hygiene as needed including keeping skin clean and dry  - Evaluate need for skin moisturizer/barrier cream  - Collaborate with interdisciplinary team   - Patient/family teaching  - Consider wound care consult   Outcome: Progressing     Problem: NEUROSENSORY - ADULT  Goal: Achieves stable or improved neurological status  Description  INTERVENTIONS  - Monitor and report changes in neurological status  - Monitor vital signs such as temperature, blood pressure, glucose, and any other labs ordered   - Initiate measures to prevent increased intracranial pressure  - Monitor for seizure activity and implement precautions if appropriate      Outcome: Progressing     Problem: CARDIOVASCULAR - ADULT  Goal: Maintains optimal cardiac output and hemodynamic stability  Description  INTERVENTIONS:  - Monitor I/O, vital signs and rhythm  - Monitor for S/S and trends of decreased cardiac output  - Administer and titrate ordered vasoactive medications to optimize hemodynamic stability  - Assess quality of pulses, skin color and temperature  - Assess for signs of decreased coronary artery perfusion  - Instruct patient to report change in severity of symptoms  Outcome: Progressing     Problem: METABOLIC, FLUID AND ELECTROLYTES - ADULT  Goal: Electrolytes maintained within normal limits  Description  INTERVENTIONS:  - Monitor labs and assess patient for signs and symptoms of electrolyte imbalances  - Administer electrolyte replacement as ordered  - Monitor response to electrolyte replacements, including repeat lab results as appropriate  - Instruct patient on fluid and nutrition as appropriate  Outcome: Progressing  Goal: Glucose maintained within target range  Description  INTERVENTIONS:  - Monitor Blood Glucose as ordered  - Assess for signs and symptoms of hyperglycemia and hypoglycemia  - Administer ordered medications to maintain glucose within target range  - Assess nutritional intake and initiate nutrition service referral as needed  Outcome: Progressing

## 2019-12-01 NOTE — NURSING NOTE
Family was able to locate pt's daughter  Daughter at bedside  Family decided to withdraw care at this time  CRNP notified

## 2019-12-03 LAB
BACTERIA BLD CULT: NORMAL
BACTERIA BLD CULT: NORMAL

## 2019-12-14 DIAGNOSIS — J32.9 CHRONIC SINUSITIS, UNSPECIFIED LOCATION: ICD-10-CM

## 2019-12-14 RX ORDER — BECLOMETHASONE DIPROPIONATE HFA 80 UG/1
AEROSOL, METERED RESPIRATORY (INHALATION)
Qty: 10.6 G | Refills: 0 | OUTPATIENT
Start: 2019-12-14

## 2020-01-13 DIAGNOSIS — E78.2 MIXED HYPERLIPIDEMIA: ICD-10-CM

## 2020-01-13 DIAGNOSIS — E03.9 HYPOTHYROIDISM, UNSPECIFIED TYPE: ICD-10-CM

## 2020-01-13 RX ORDER — LEVOTHYROXINE SODIUM 0.03 MG/1
TABLET ORAL
Qty: 90 TABLET | Refills: 0 | OUTPATIENT
Start: 2020-01-13

## 2020-01-13 RX ORDER — ROSUVASTATIN CALCIUM 20 MG/1
TABLET, COATED ORAL
Qty: 90 TABLET | Refills: 0 | OUTPATIENT
Start: 2020-01-13

## 2024-04-15 NOTE — TELEPHONE ENCOUNTER
Rx for Diclofenac sodium (voltaren) 1% Gel PRN for hand pain  Can continue Diclofenac 50 mg daily PRN for pain (he rarely takes this)  Discussed compression gloves/arthritis gloves  Warm water can also help   Mammogram normal

## (undated) DEVICE — GLOVE INDICATOR PI UNDERGLOVE SZ 8.5 BLUE

## (undated) DEVICE — U-DRAPE: Brand: CONVERTORS

## (undated) DEVICE — 10FR FRAZIER SUCTION HANDLE: Brand: CARDINAL HEALTH

## (undated) DEVICE — ACE WRAP 3 IN UNSTERILE

## (undated) DEVICE — CUFF TOURNIQUET 18 X 4 IN QUICK CONNECT DISP 1 BLADDER

## (undated) DEVICE — 3M™ STERI-DRAPE™ U-DRAPE 1015: Brand: STERI-DRAPE™

## (undated) DEVICE — COBAN 4 IN STERILE

## (undated) DEVICE — GAUZE SPONGES,USP TYPE VII GAUZE, 12 PLY: Brand: CURITY

## (undated) DEVICE — SCD SEQUENTIAL COMPRESSION COMFORT SLEEVE MEDIUM KNEE LENGTH: Brand: KENDALL SCD

## (undated) DEVICE — SUT ETHILON 3-0 PS-1 18 IN 1663G

## (undated) DEVICE — GLOVE SRG BIOGEL 8

## (undated) DEVICE — NEEDLE 18 G X 1 1/2

## (undated) DEVICE — INTENDED FOR TISSUE SEPARATION, AND OTHER PROCEDURES THAT REQUIRE A SHARP SURGICAL BLADE TO PUNCTURE OR CUT.: Brand: BARD-PARKER ® CARBON RIB-BACK BLADES

## (undated) DEVICE — CAST PADDING 4 IN SYNTHETIC NON-STRL

## (undated) DEVICE — PADDING CAST 3IN COTTON STRL

## (undated) DEVICE — GLOVE SRG BIOGEL 7.5

## (undated) DEVICE — PAD CAST 4 IN COTTON NON STERILE

## (undated) DEVICE — SPLINT 4 X 15 IN FAST SET PLASTER

## (undated) DEVICE — DRAPE C-ARM X-RAY

## (undated) DEVICE — 3000CC GUARDIAN II: Brand: GUARDIAN

## (undated) DEVICE — TUBING SUCTION 5MM X 12 FT

## (undated) DEVICE — IMPLANTABLE DEVICE
Type: IMPLANTABLE DEVICE | Site: WRIST | Status: NON-FUNCTIONAL
Brand: K-WIRE
Removed: 2019-02-14

## (undated) DEVICE — BASIC SINGLE BASIN-LF: Brand: MEDLINE INDUSTRIES, INC.

## (undated) DEVICE — STERILE BETHLEHEM PLASTIC HAND: Brand: CARDINAL HEALTH

## (undated) DEVICE — CHLORAPREP HI-LITE 26ML ORANGE

## (undated) DEVICE — PADDING CAST 4 IN  COTTON STRL

## (undated) DEVICE — SUT VICRYL 2-0 SH 27 IN UNDYED J417H

## (undated) DEVICE — SKIN MARKER DUAL TIP WITH RULER CAP, FLEXIBLE RULER AND LABELS: Brand: DEVON

## (undated) DEVICE — ACE WRAP 4 IN UNSTERILE

## (undated) DEVICE — GLOVE SRG BIOGEL 8.5

## (undated) DEVICE — Device: Brand: DRILL BIT F.A.S.T.

## (undated) DEVICE — CURITY STRETCH BANDAGE: Brand: CURITY

## (undated) DEVICE — OCCLUSIVE GAUZE STRIP,3% BISMUTH TRIBROMOPHENATE IN PETROLATUM BLEND: Brand: XEROFORM

## (undated) DEVICE — DRAPE EQUIPMENT RF WAND

## (undated) DEVICE — CYSTO TUBING SINGLE IRRIGATION

## (undated) DEVICE — Device: Brand: DRILL BIT